# Patient Record
Sex: MALE | Race: WHITE | NOT HISPANIC OR LATINO | Employment: OTHER | URBAN - METROPOLITAN AREA
[De-identification: names, ages, dates, MRNs, and addresses within clinical notes are randomized per-mention and may not be internally consistent; named-entity substitution may affect disease eponyms.]

---

## 2017-05-15 ENCOUNTER — TRANSCRIBE ORDERS (OUTPATIENT)
Dept: ADMINISTRATIVE | Facility: HOSPITAL | Age: 72
End: 2017-05-15

## 2017-05-15 DIAGNOSIS — M54.5 LOW BACK PAIN, UNSPECIFIED BACK PAIN LATERALITY, UNSPECIFIED CHRONICITY, WITH SCIATICA PRESENCE UNSPECIFIED: Primary | ICD-10-CM

## 2017-05-23 ENCOUNTER — HOSPITAL ENCOUNTER (OUTPATIENT)
Dept: GENERAL RADIOLOGY | Facility: HOSPITAL | Age: 72
Discharge: HOME | End: 2017-05-23

## 2017-05-23 ENCOUNTER — ANESTHESIA EVENT (OUTPATIENT)
Dept: PAIN MEDICINE | Facility: HOSPITAL | Age: 72
End: 2017-05-23

## 2017-05-23 ENCOUNTER — ANESTHESIA (OUTPATIENT)
Dept: PAIN MEDICINE | Facility: HOSPITAL | Age: 72
End: 2017-05-23

## 2017-05-23 ENCOUNTER — HOSPITAL ENCOUNTER (OUTPATIENT)
Dept: PAIN MEDICINE | Facility: HOSPITAL | Age: 72
Discharge: HOME | End: 2017-05-23
Attending: SPECIALIST | Admitting: SPECIALIST

## 2017-05-23 VITALS
SYSTOLIC BLOOD PRESSURE: 122 MMHG | OXYGEN SATURATION: 99 % | HEART RATE: 60 BPM | HEIGHT: 68 IN | TEMPERATURE: 97.8 F | WEIGHT: 286 LBS | DIASTOLIC BLOOD PRESSURE: 68 MMHG | RESPIRATION RATE: 16 BRPM | BODY MASS INDEX: 43.35 KG/M2

## 2017-05-23 DIAGNOSIS — R52 PAIN: ICD-10-CM

## 2017-05-23 PROCEDURE — 25010000002 METHYLPREDNISOLONE PER 80 MG: Performed by: ANESTHESIOLOGY

## 2017-05-23 PROCEDURE — C1755 CATHETER, INTRASPINAL: HCPCS

## 2017-05-23 PROCEDURE — 0 IOPAMIDOL 41 % SOLUTION: Performed by: ANESTHESIOLOGY

## 2017-05-23 PROCEDURE — 77003 FLUOROGUIDE FOR SPINE INJECT: CPT

## 2017-05-23 RX ORDER — METHYLPREDNISOLONE ACETATE 80 MG/ML
80 INJECTION, SUSPENSION INTRA-ARTICULAR; INTRALESIONAL; INTRAMUSCULAR; SOFT TISSUE ONCE
Status: COMPLETED | OUTPATIENT
Start: 2017-05-23 | End: 2017-05-23

## 2017-05-23 RX ORDER — FENTANYL CITRATE 50 UG/ML
50 INJECTION, SOLUTION INTRAMUSCULAR; INTRAVENOUS
Status: DISCONTINUED | OUTPATIENT
Start: 2017-05-23 | End: 2017-05-24 | Stop reason: HOSPADM

## 2017-05-23 RX ORDER — HYDROCODONE BITARTRATE AND ACETAMINOPHEN 5; 325 MG/1; MG/1
1 TABLET ORAL EVERY 6 HOURS PRN
COMMUNITY
End: 2017-11-07 | Stop reason: ALTCHOICE

## 2017-05-23 RX ORDER — LATANOPROST 50 UG/ML
1 SOLUTION/ DROPS OPHTHALMIC NIGHTLY
COMMUNITY

## 2017-05-23 RX ORDER — CLOTRIMAZOLE AND BETAMETHASONE DIPROPIONATE 10; .64 MG/G; MG/G
1 CREAM TOPICAL DAILY PRN
COMMUNITY
End: 2017-11-07

## 2017-05-23 RX ORDER — SODIUM CHLORIDE 0.9 % (FLUSH) 0.9 %
1-10 SYRINGE (ML) INJECTION AS NEEDED
Status: DISCONTINUED | OUTPATIENT
Start: 2017-05-23 | End: 2017-05-24 | Stop reason: HOSPADM

## 2017-05-23 RX ORDER — MIDAZOLAM HYDROCHLORIDE 1 MG/ML
1 INJECTION INTRAMUSCULAR; INTRAVENOUS
Status: DISCONTINUED | OUTPATIENT
Start: 2017-05-23 | End: 2017-05-24 | Stop reason: HOSPADM

## 2017-05-23 RX ORDER — LIDOCAINE HYDROCHLORIDE 10 MG/ML
1 INJECTION, SOLUTION INFILTRATION; PERINEURAL ONCE
Status: DISCONTINUED | OUTPATIENT
Start: 2017-05-23 | End: 2017-05-24 | Stop reason: HOSPADM

## 2017-05-23 RX ADMIN — IOPAMIDOL 10 ML: 408 INJECTION, SOLUTION INTRATHECAL at 10:34

## 2017-05-23 RX ADMIN — METHYLPREDNISOLONE ACETATE 80 MG: 80 INJECTION, SUSPENSION INTRA-ARTICULAR; INTRALESIONAL; INTRAMUSCULAR; SOFT TISSUE at 10:35

## 2017-05-26 ENCOUNTER — HOSPITAL ENCOUNTER (OUTPATIENT)
Dept: PAIN MEDICINE | Facility: HOSPITAL | Age: 72
Discharge: HOME | End: 2017-05-26
Attending: SPECIALIST

## 2017-06-15 ENCOUNTER — HOSPITAL ENCOUNTER (OUTPATIENT)
Dept: GENERAL RADIOLOGY | Facility: HOSPITAL | Age: 72
Discharge: HOME | End: 2017-06-15

## 2017-06-15 ENCOUNTER — ANESTHESIA (OUTPATIENT)
Dept: PAIN MEDICINE | Facility: HOSPITAL | Age: 72
End: 2017-06-15

## 2017-06-15 ENCOUNTER — HOSPITAL ENCOUNTER (OUTPATIENT)
Dept: PAIN MEDICINE | Facility: HOSPITAL | Age: 72
Discharge: HOME | End: 2017-06-15
Admitting: SPECIALIST

## 2017-06-15 ENCOUNTER — ANESTHESIA EVENT (OUTPATIENT)
Dept: PAIN MEDICINE | Facility: HOSPITAL | Age: 72
End: 2017-06-15

## 2017-06-15 VITALS
RESPIRATION RATE: 16 BRPM | BODY MASS INDEX: 43.35 KG/M2 | TEMPERATURE: 97.7 F | OXYGEN SATURATION: 95 % | SYSTOLIC BLOOD PRESSURE: 111 MMHG | HEART RATE: 67 BPM | HEIGHT: 68 IN | DIASTOLIC BLOOD PRESSURE: 56 MMHG | WEIGHT: 286 LBS

## 2017-06-15 DIAGNOSIS — R52 PAIN: ICD-10-CM

## 2017-06-15 PROCEDURE — 25010000002 MIDAZOLAM PER 1 MG: Performed by: ANESTHESIOLOGY

## 2017-06-15 PROCEDURE — 77003 FLUOROGUIDE FOR SPINE INJECT: CPT

## 2017-06-15 PROCEDURE — 0 IOPAMIDOL 41 % SOLUTION: Performed by: ANESTHESIOLOGY

## 2017-06-15 PROCEDURE — 25010000002 FENTANYL CITRATE (PF) 100 MCG/2ML SOLUTION: Performed by: ANESTHESIOLOGY

## 2017-06-15 PROCEDURE — C1755 CATHETER, INTRASPINAL: HCPCS

## 2017-06-15 PROCEDURE — 25010000002 METHYLPREDNISOLONE PER 80 MG: Performed by: ANESTHESIOLOGY

## 2017-06-15 RX ORDER — LIDOCAINE HYDROCHLORIDE 10 MG/ML
0.5 INJECTION, SOLUTION INFILTRATION; PERINEURAL ONCE AS NEEDED
Status: DISCONTINUED | OUTPATIENT
Start: 2017-06-15 | End: 2017-06-16 | Stop reason: HOSPADM

## 2017-06-15 RX ORDER — FENTANYL CITRATE 50 UG/ML
50 INJECTION, SOLUTION INTRAMUSCULAR; INTRAVENOUS
Status: DISCONTINUED | OUTPATIENT
Start: 2017-06-15 | End: 2017-06-16 | Stop reason: HOSPADM

## 2017-06-15 RX ORDER — SODIUM CHLORIDE 0.9 % (FLUSH) 0.9 %
1-10 SYRINGE (ML) INJECTION AS NEEDED
Status: DISCONTINUED | OUTPATIENT
Start: 2017-06-15 | End: 2017-06-16 | Stop reason: HOSPADM

## 2017-06-15 RX ORDER — MIDAZOLAM HYDROCHLORIDE 1 MG/ML
1 INJECTION INTRAMUSCULAR; INTRAVENOUS
Status: DISCONTINUED | OUTPATIENT
Start: 2017-06-15 | End: 2017-06-16 | Stop reason: HOSPADM

## 2017-06-15 RX ORDER — LIDOCAINE HYDROCHLORIDE 10 MG/ML
1 INJECTION, SOLUTION INFILTRATION; PERINEURAL ONCE
Status: DISCONTINUED | OUTPATIENT
Start: 2017-06-15 | End: 2017-06-16 | Stop reason: HOSPADM

## 2017-06-15 RX ORDER — METHYLPREDNISOLONE ACETATE 80 MG/ML
80 INJECTION, SUSPENSION INTRA-ARTICULAR; INTRALESIONAL; INTRAMUSCULAR; SOFT TISSUE ONCE
Status: COMPLETED | OUTPATIENT
Start: 2017-06-15 | End: 2017-06-15

## 2017-06-15 RX ADMIN — MIDAZOLAM 2 MG: 1 INJECTION INTRAMUSCULAR; INTRAVENOUS at 09:44

## 2017-06-15 RX ADMIN — FENTANYL CITRATE 50 MCG: 50 INJECTION INTRAMUSCULAR; INTRAVENOUS at 09:44

## 2017-06-15 RX ADMIN — IOPAMIDOL 10 ML: 408 INJECTION, SOLUTION INTRATHECAL at 09:50

## 2017-06-15 RX ADMIN — METHYLPREDNISOLONE ACETATE 80 MG: 80 INJECTION, SUSPENSION INTRA-ARTICULAR; INTRALESIONAL; INTRAMUSCULAR; SOFT TISSUE at 09:50

## 2017-06-15 NOTE — H&P (VIEW-ONLY)
CHIEF COMPLAINT: back pain      HISTORY OF PRESENT ILLNESS:  He received 2 epidural steroid injections last January and July with excellent results lasting him almost a full year of over 50% relief.  He returns with complaints of low lumbar back pain without radiation. Constant in timing.  Between 7 and 10 out of 10 on the pain scale.  Described as aching deep throbbing.  Relieved by rest sheet and over-the-counter medicationssuch asSkelaxin and Norco.  It's affecting his ADLs is exercise and his sleep.  He is not doing any home physical therapy or home exercises and I encouraged him to begin.      PAST MEDICAL HISTORY:  Allergy to Ceclor  Medications include Flonase aspirin allopurinol Lasix Glucotrol Advil insulin Skelaxin Glucophage Toprol ProcardiaActos Flomax Diovan  History of diabetes hypertension sleep apnea morbid obesity    SOCIAL HISTORY:  No tobacco    REVIEW OF SYSTEMS:  No hematologic infectious or constitutional symptoms    PHYSICAL EXAM:  /81 pulse 72 respirations 16 sat 96% temp 36.6 height 5 foot 8 weight 286 pounds BMI 43t  Well-developed well-nourished no acute distress  Extra ocular movements intact  Mallampati class III airway  Cardiac:  Regular rate and rhythm  Lungs:  Clear to auscultation bilaterally with good effort  Alert and oriented ×3  Deep tendon reflexes normal in the left patella, decreased in the right  positive straight leg raise bilaterally  5 out of 5 strength bilateral upper and lower extremities  Lumbar spine without obvious deformities ecchymoses  Lumbar spine nontender to palpation      DIAGNOSIS:  Lumbar Spinal Stenosis without neurogenic claudication  lumbago      PLAN:  1.  Lumbar epidural steroid injections, up to 3, spaced 1-2 weeks apart.  If pain control is acceptable after 1 or 2 injections, it was discussed with the patient that they may return for the subsequent injections if and when their pain returns.  The risks were discussed with the patient including  failure of relief, worsening pain, Headache (post dural puncture headache), bleeding (epidural hematoma) and infection (epidural abscess or skin infection).  2.  Physical therapy exercises at home as prescribed by physical therapy or from the pain clinic handout (given to the patient).  Continuation of these exercises every day, or multiple times per week, even when the patient has good pain relief, was stressed to the patient as a preventative measure to decrease the frequency and severity of future pain episodes.  3.  Continue pain medicines as already prescribed.  If patient not currently taking any, it is recommended to begin Acetaminophen 1000 mg po q 8 hours.  If other medicines containing Acetaminophen are currently prescribed, maintain daily dose at 3000 mg.    4.  If they can tolerate NSAIDS, it is recommended to take Ibuprofen 600 mg po q 6 hours for 7 days during pain exacerbations.  Alternatively, they may substitute an NSAID of their choice (e.g. Aleve).  This may be taken at the same time as Acetaminophen.  5.  Heat and ice to the affected area as tolerated for pain control.  It was discussed that heating pads can cause burns.  6.  Daily low impact exercise such as walking or water exercise was recommended to maintain overall health and aid in weight control.   7.  Follow up as needed for subsequent injections.  8.  Patient was counseled to abstain from tobacco products.

## 2017-06-15 NOTE — ANESTHESIA PROCEDURE NOTES
PAIN Epidural block    Patient location during procedure: pain clinic  Start Time: 6/15/2017 9:34 AM  Stop Time: 6/15/2017 9:34 AM  Indication:procedure for pain  Performed By  Anesthesiologist: KWESI WTASON  Preanesthetic Checklist  Completed: patient identified, site marked, surgical consent, pre-op evaluation, timeout performed, risks and benefits discussed and monitors and equipment checked  Additional Notes  Interlaminar epidural was performed under fluoroscopic guidance.    Diagnosis  * Degeneration of lumbar intervertebral disc (M51.36)  * Spinal stenosis of lumbar region without neurogenic claudication (M48.06)  * Lumbago (M54.5)      Epidural Block Prep:  Pt Position:prone  Sterile Tech:cap, gloves, mask and sterile barrier  Prep:chlorhexidine gluconate and isopropyl alcohol  Monitoring:blood pressure monitoring, continuous pulse oximetry and EKG  Epidural Block Procedure:  Approach:left paramedian  Guidance: fluoroscopy  Location:lumbar  Level:4-5  Needle Type:Tuohy  Needle Gauge:20  Aspiration:negative  Medications:  Depomedrol:80 mg  Preservative Free Saline:3mL  Isovue:2mL  Comments:Epidural spread of contrast.  Post Assessment:  Dressing:occlusive dressing applied  Pt Tolerance:patient tolerated the procedure well with no apparent complications  Complications:no

## 2017-06-15 NOTE — INTERVAL H&P NOTE
[facility]  H&P reviewed. No changes since last visit.  Patient states   25-50% improvement since the last procedure/injection.    Diagnosis     * Degeneration of lumbar intervertebral disc [M51.36]     * Spinal stenosis of lumbar region without neurogenic claudication [M48.06]     * Lumbago [M54.5]      Airway assessed since last visit. Airway class equals: 2.  His pain level today is a 5/10.

## 2017-10-30 ENCOUNTER — TRANSCRIBE ORDERS (OUTPATIENT)
Dept: PAIN MEDICINE | Facility: HOSPITAL | Age: 72
End: 2017-10-30

## 2017-10-30 DIAGNOSIS — M54.5 LOW BACK PAIN, UNSPECIFIED BACK PAIN LATERALITY, UNSPECIFIED CHRONICITY, WITH SCIATICA PRESENCE UNSPECIFIED: Primary | ICD-10-CM

## 2017-11-07 ENCOUNTER — ANESTHESIA EVENT (OUTPATIENT)
Dept: PAIN MEDICINE | Facility: HOSPITAL | Age: 72
End: 2017-11-07

## 2017-11-07 ENCOUNTER — HOSPITAL ENCOUNTER (OUTPATIENT)
Dept: PAIN MEDICINE | Facility: HOSPITAL | Age: 72
Discharge: HOME | End: 2017-11-07
Admitting: SPECIALIST

## 2017-11-07 ENCOUNTER — ANESTHESIA (OUTPATIENT)
Dept: PAIN MEDICINE | Facility: HOSPITAL | Age: 72
End: 2017-11-07

## 2017-11-07 ENCOUNTER — HOSPITAL ENCOUNTER (OUTPATIENT)
Dept: GENERAL RADIOLOGY | Facility: HOSPITAL | Age: 72
Discharge: HOME | End: 2017-11-07

## 2017-11-07 VITALS
WEIGHT: 282 LBS | BODY MASS INDEX: 42.74 KG/M2 | TEMPERATURE: 97.9 F | DIASTOLIC BLOOD PRESSURE: 65 MMHG | RESPIRATION RATE: 16 BRPM | SYSTOLIC BLOOD PRESSURE: 128 MMHG | HEART RATE: 70 BPM | OXYGEN SATURATION: 97 % | HEIGHT: 68 IN

## 2017-11-07 DIAGNOSIS — R52 PAIN: ICD-10-CM

## 2017-11-07 DIAGNOSIS — M54.5 LOW BACK PAIN, UNSPECIFIED BACK PAIN LATERALITY, UNSPECIFIED CHRONICITY, WITH SCIATICA PRESENCE UNSPECIFIED: ICD-10-CM

## 2017-11-07 LAB — GLUCOSE BLDC GLUCOMTR-MCNC: 145 MG/DL (ref 70–130)

## 2017-11-07 PROCEDURE — 25010000002 MIDAZOLAM PER 1 MG: Performed by: ANESTHESIOLOGY

## 2017-11-07 PROCEDURE — C1755 CATHETER, INTRASPINAL: HCPCS

## 2017-11-07 PROCEDURE — 77003 FLUOROGUIDE FOR SPINE INJECT: CPT

## 2017-11-07 PROCEDURE — 25010000002 METHYLPREDNISOLONE PER 80 MG: Performed by: ANESTHESIOLOGY

## 2017-11-07 PROCEDURE — 0 IOPAMIDOL 41 % SOLUTION: Performed by: ANESTHESIOLOGY

## 2017-11-07 PROCEDURE — 82962 GLUCOSE BLOOD TEST: CPT

## 2017-11-07 PROCEDURE — 25010000002 FENTANYL CITRATE (PF) 100 MCG/2ML SOLUTION: Performed by: ANESTHESIOLOGY

## 2017-11-07 RX ORDER — MIDAZOLAM HYDROCHLORIDE 1 MG/ML
1 INJECTION INTRAMUSCULAR; INTRAVENOUS AS NEEDED
Status: DISCONTINUED | OUTPATIENT
Start: 2017-11-07 | End: 2017-11-08 | Stop reason: HOSPADM

## 2017-11-07 RX ORDER — FENTANYL CITRATE 50 UG/ML
50 INJECTION, SOLUTION INTRAMUSCULAR; INTRAVENOUS AS NEEDED
Status: DISCONTINUED | OUTPATIENT
Start: 2017-11-07 | End: 2017-11-08 | Stop reason: HOSPADM

## 2017-11-07 RX ORDER — METHYLPREDNISOLONE ACETATE 80 MG/ML
80 INJECTION, SUSPENSION INTRA-ARTICULAR; INTRALESIONAL; INTRAMUSCULAR; SOFT TISSUE ONCE
Status: COMPLETED | OUTPATIENT
Start: 2017-11-07 | End: 2017-11-07

## 2017-11-07 RX ORDER — SODIUM CHLORIDE 0.9 % (FLUSH) 0.9 %
1-10 SYRINGE (ML) INJECTION AS NEEDED
Status: DISCONTINUED | OUTPATIENT
Start: 2017-11-07 | End: 2017-11-08 | Stop reason: HOSPADM

## 2017-11-07 RX ORDER — LIDOCAINE HYDROCHLORIDE 10 MG/ML
1 INJECTION, SOLUTION INFILTRATION; PERINEURAL ONCE AS NEEDED
Status: DISCONTINUED | OUTPATIENT
Start: 2017-11-07 | End: 2017-11-08 | Stop reason: HOSPADM

## 2017-11-07 RX ADMIN — METHYLPREDNISOLONE ACETATE 80 MG: 80 INJECTION, SUSPENSION INTRA-ARTICULAR; INTRALESIONAL; INTRAMUSCULAR; SOFT TISSUE at 09:05

## 2017-11-07 RX ADMIN — IOPAMIDOL 2 ML: 408 INJECTION, SOLUTION INTRATHECAL at 09:05

## 2017-11-07 RX ADMIN — FENTANYL CITRATE 50 MCG: 50 INJECTION INTRAMUSCULAR; INTRAVENOUS at 09:01

## 2017-11-07 RX ADMIN — MIDAZOLAM 1 MG: 1 INJECTION INTRAMUSCULAR; INTRAVENOUS at 09:02

## 2017-11-07 NOTE — ANESTHESIA PROCEDURE NOTES
PAIN Epidural block    Patient location during procedure: pain clinic  Start Time: 11/7/2017 9:00 AM  Indication:at surgeon's request and procedure for pain  Performed By  Anesthesiologist: NATHANAEL RODRÍGUEZ  Preanesthetic Checklist  Completed: patient identified, site marked, surgical consent, pre-op evaluation, timeout performed, IV checked, risks and benefits discussed and monitors and equipment checked  Additional Notes  Post-Op Diagnosis Codes:     * Lumbar disc displacement without myelopathy (M51.26)     * Lumbar degenerative disc disease (M51.36)  Prep:  Pt Position:prone  Sterile Tech:cap, gloves, mask and sterile barrier  Prep:chlorhexidine gluconate and isopropyl alcohol  Monitoring:blood pressure monitoring, continuous pulse oximetry and EKG  Procedure:  Approach:left paramedian  Guidance: fluoroscopy  Location:lumbar  Level:4-5  Needle Type:Pao  Needle Gauge:17 G  Cath Depth at skin:10 cm  Aspiration:negative  Test Dose:negative  Medications:  Depomedrol:80 mg  Preservative Free Saline:4mL  Isovue:2mL  Comments:Parasthesias on injection  Post Assessment:  Dressing:occlusive dressing applied  Pt Tolerance:patient tolerated the procedure well with no apparent complications  Complications:no

## 2017-11-08 ENCOUNTER — TRANSCRIBE ORDERS (OUTPATIENT)
Dept: ADMINISTRATIVE | Facility: HOSPITAL | Age: 72
End: 2017-11-08

## 2017-11-08 DIAGNOSIS — M54.40 ACUTE LEFT-SIDED LOW BACK PAIN WITH SCIATICA, SCIATICA LATERALITY UNSPECIFIED: Primary | ICD-10-CM

## 2017-12-18 ENCOUNTER — HOSPITAL ENCOUNTER (OUTPATIENT)
Dept: PAIN MEDICINE | Facility: HOSPITAL | Age: 72
Discharge: HOME | End: 2017-12-18
Attending: SPECIALIST

## 2017-12-28 ENCOUNTER — ANESTHESIA EVENT (OUTPATIENT)
Dept: PAIN MEDICINE | Facility: HOSPITAL | Age: 72
End: 2017-12-28

## 2017-12-28 ENCOUNTER — HOSPITAL ENCOUNTER (OUTPATIENT)
Dept: PAIN MEDICINE | Facility: HOSPITAL | Age: 72
Discharge: HOME | End: 2017-12-28
Attending: SPECIALIST | Admitting: SPECIALIST

## 2017-12-28 ENCOUNTER — ANESTHESIA (OUTPATIENT)
Dept: PAIN MEDICINE | Facility: HOSPITAL | Age: 72
End: 2017-12-28

## 2017-12-28 ENCOUNTER — HOSPITAL ENCOUNTER (OUTPATIENT)
Dept: GENERAL RADIOLOGY | Facility: HOSPITAL | Age: 72
Discharge: HOME | End: 2017-12-28

## 2017-12-28 VITALS
SYSTOLIC BLOOD PRESSURE: 131 MMHG | TEMPERATURE: 97.8 F | DIASTOLIC BLOOD PRESSURE: 71 MMHG | OXYGEN SATURATION: 97 % | BODY MASS INDEX: 42.44 KG/M2 | RESPIRATION RATE: 16 BRPM | HEIGHT: 68 IN | WEIGHT: 280 LBS | HEART RATE: 72 BPM

## 2017-12-28 DIAGNOSIS — R52 PAIN: ICD-10-CM

## 2017-12-28 PROCEDURE — 25010000002 METHYLPREDNISOLONE PER 80 MG: Performed by: ANESTHESIOLOGY

## 2017-12-28 PROCEDURE — 25010000002 MIDAZOLAM PER 1 MG: Performed by: ANESTHESIOLOGY

## 2017-12-28 PROCEDURE — 77003 FLUOROGUIDE FOR SPINE INJECT: CPT

## 2017-12-28 PROCEDURE — C1755 CATHETER, INTRASPINAL: HCPCS

## 2017-12-28 PROCEDURE — 25010000002 FENTANYL CITRATE (PF) 100 MCG/2ML SOLUTION: Performed by: ANESTHESIOLOGY

## 2017-12-28 PROCEDURE — 0 IOPAMIDOL 41 % SOLUTION: Performed by: ANESTHESIOLOGY

## 2017-12-28 RX ORDER — SODIUM CHLORIDE 0.9 % (FLUSH) 0.9 %
1-10 SYRINGE (ML) INJECTION AS NEEDED
Status: DISCONTINUED | OUTPATIENT
Start: 2017-12-28 | End: 2017-12-29 | Stop reason: HOSPADM

## 2017-12-28 RX ORDER — LIDOCAINE HYDROCHLORIDE 10 MG/ML
0.5 INJECTION, SOLUTION INFILTRATION; PERINEURAL ONCE AS NEEDED
Status: CANCELLED | OUTPATIENT
Start: 2017-12-28

## 2017-12-28 RX ORDER — FENTANYL CITRATE 50 UG/ML
50 INJECTION, SOLUTION INTRAMUSCULAR; INTRAVENOUS AS NEEDED
Status: DISCONTINUED | OUTPATIENT
Start: 2017-12-28 | End: 2017-12-29 | Stop reason: HOSPADM

## 2017-12-28 RX ORDER — LIDOCAINE HYDROCHLORIDE 10 MG/ML
1 INJECTION, SOLUTION INFILTRATION; PERINEURAL ONCE AS NEEDED
Status: DISCONTINUED | OUTPATIENT
Start: 2017-12-28 | End: 2017-12-29 | Stop reason: HOSPADM

## 2017-12-28 RX ORDER — MIDAZOLAM HYDROCHLORIDE 1 MG/ML
1 INJECTION INTRAMUSCULAR; INTRAVENOUS AS NEEDED
Status: DISCONTINUED | OUTPATIENT
Start: 2017-12-28 | End: 2017-12-29 | Stop reason: HOSPADM

## 2017-12-28 RX ORDER — METHYLPREDNISOLONE ACETATE 80 MG/ML
80 INJECTION, SUSPENSION INTRA-ARTICULAR; INTRALESIONAL; INTRAMUSCULAR; SOFT TISSUE ONCE
Status: COMPLETED | OUTPATIENT
Start: 2017-12-28 | End: 2017-12-28

## 2017-12-28 RX ADMIN — FENTANYL CITRATE 50 MCG: 50 INJECTION INTRAMUSCULAR; INTRAVENOUS at 10:40

## 2017-12-28 RX ADMIN — IOPAMIDOL 10 ML: 408 INJECTION, SOLUTION INTRATHECAL at 10:48

## 2017-12-28 RX ADMIN — Medication 1 MG: at 10:46

## 2017-12-28 RX ADMIN — METHYLPREDNISOLONE ACETATE 80 MG: 80 INJECTION, SUSPENSION INTRA-ARTICULAR; INTRALESIONAL; INTRAMUSCULAR; SOFT TISSUE at 10:48

## 2017-12-28 RX ADMIN — Medication 1 MG: at 10:40

## 2017-12-28 NOTE — ANESTHESIA PROCEDURE NOTES
PAIN Epidural block    Patient location during procedure: pain clinic  Start Time: 12/28/2017 10:31 AM  Stop Time: 12/28/2017 10:49 AM  Indication:procedure for pain  Performed By  Anesthesiologist: KWESI WATSON  Preanesthetic Checklist  Completed: patient identified, site marked, surgical consent, pre-op evaluation, timeout performed, risks and benefits discussed and monitors and equipment checked  Additional Notes  Interlaminar epidural was performed under fluoroscopic guidance.    Diagnosis     * Degeneration of lumbar intervertebral disc (M51.36)     * Lumbago (M54.5)     * Spinal stenosis, lumbar region without neurogenic claudication (M48.061)  Prep:  Pt Position:prone  Sterile Tech:cap, gloves, mask and sterile barrier  Prep:chlorhexidine gluconate and isopropyl alcohol  Monitoring:blood pressure monitoring, continuous pulse oximetry and EKG  Procedure:  Sedation: yes   Approach:left paramedian  Guidance: fluoroscopy  Location:lumbar  Level:4-5  Needle Type:Tuohy  Needle Gauge:20 G  Aspiration:negative  Medications:  Depomedrol:80 mg  Preservative Free Saline:3mL  Isovue:2mL  Comments:Epidural spread of contrast.  Post Assessment:  Dressing:occlusive dressing applied  Pt Tolerance:patient tolerated the procedure well with no apparent complications  Complications:no

## 2017-12-28 NOTE — H&P
[facility]    History and Physical    Patient Name: Will Garcia  :  1945  MRN:  [id number]  Date of Admission: 2017    Subjective     Patient is a 72 y.o. male presents with chief complaint of chronic, constant, moderate, severe low back pain.  Onset of symptoms was gradual starting 50 years ago.  Symptoms are associated/aggravated by activity. Symptoms improve with pain medication and rest.  He has recently undergone a series of lumbar epidural steroid injections with favorable results.  However, he has began to notice some subjective weakness in his left lower extremity following his last injection.  He presents today for a new series.    The following portions of the patients history were reviewed and updated as appropriate: current medications, allergies, past medical history, past surgical history, past family history, past social history and problem list                Objective     Past Medical History:   Past Medical History:   Diagnosis Date   • Arthritis    • Chronic pain disorder    • Diabetes    • Edema    • Hypertension    • Kidney stones    • Low back pain    • Lumbosacral disc disease    • Sleep apnea      Past Surgical History:   Past Surgical History:   Procedure Laterality Date   • CATARACT EXTRACTION     • EPIDURAL BLOCK     • KIDNEY STONE SURGERY     • SHOULDER ARTHROSCOPY Left    • TONSILLECTOMY       Family History: No family history on file.  Social History:   Social History   Substance Use Topics   • Smoking status: Never Smoker   • Smokeless tobacco: Never Used   • Alcohol use Yes      Comment: occasional       Vital Signs Range for the last 24 hours  Temperature: Temp:  [36.6 °C (97.8 °F)] 36.6 °C (97.8 °F)   Temp Source: Temp src: Oral   BP: BP: (144)/(77) 144/77   Pulse: Heart Rate:  [76] 76   Respirations: Resp:  [16] 16   SPO2: SpO2:  [96 %] 96 %   O2 Amount (l/min):     O2 Devices O2 Device: room air   Weight:            --------------------------------------------------------------------------------    Current Outpatient Prescriptions   Medication Sig Dispense Refill   • Albiglutide (TANZEUM) 50 MG pen-injector Inject 50 mg under the skin 1 (One) Time Per Week.     • allopurinol (ZYLOPRIM) 100 MG tablet Take 100 mg by mouth Every Night.     • fluticasone (FLONASE) 50 MCG/ACT nasal spray 2 sprays into each nostril daily. Administer 2 sprays in each nostril for each dose.     • furosemide (LASIX) 80 MG tablet Take 80 mg by mouth daily.     • ibuprofen (ADVIL,MOTRIN) 800 MG tablet Take 800 mg by mouth every 8 (eight) hours as needed for mild pain (1-3).     • insulin glargine (LANTUS) 100 UNIT/ML injection Inject 110 Units under the skin Every Night.     • latanoprost (XALATAN) 0.005 % ophthalmic solution 1 drop Every Night.     • metFORMIN (GLUCOPHAGE) 500 MG tablet Take 500 mg by mouth Every Night. 4 tabs     • metoprolol succinate XL (TOPROL-XL) 100 MG 24 hr tablet Take 100 mg by mouth daily.     • NIFEdipine XL (PROCARDIA XL) 60 MG 24 hr tablet Take 60 mg by mouth Every Night.     • pioglitazone (ACTOS) 45 MG tablet Take 45 mg by mouth daily.     • tamsulosin (FLOMAX) 0.4 MG capsule 24 hr capsule Take 2 capsules by mouth every night.     • valsartan (DIOVAN) 320 MG tablet Take 320 mg by mouth daily.     • aspirin 81 MG EC tablet Take 81 mg by mouth Every Night.       No current facility-administered medications for this encounter.        --------------------------------------------------------------------------------  Assessment/Plan      Anesthesia Evaluation     Patient summary reviewed and Nursing notes reviewed   NPO Solid Status: > 8 hours  NPO Liquid Status: < 2 hours     Airway   Mallampati: II  TM distance: >3 FB  Neck ROM: full  Dental - normal exam     Pulmonary - normal exam    breath sounds clear to auscultation  (+) sleep apnea,   Cardiovascular - normal exam    Patient on routine beta blocker  Rhythm:  regular  Rate: normal    (+) hypertension,   (-) angina, orthopnea, PND, FLOYD      Neuro/Psych- negative ROS  GI/Hepatic/Renal/Endo    (+) morbid obesity, renal disease stones, diabetes mellitus using insulin,     Musculoskeletal     (+) back pain, chronic pain,   Abdominal  - normal exam    Abdomen: soft.  Bowel sounds: normal.   Substance History - negative use     OB/GYN negative ob/gyn ROS         Other - negative ROS                                          Diagnosis and Plan    Treatment Plan  ASA 3      Procedures: Lumbar Epidural Steroid Injection(LESI), With fluoroscopy,       Anesthetic plan and risks discussed with patient.          Diagnosis     * Degeneration of lumbar intervertebral disc [M51.36]     * Lumbago [M54.5]     * Spinal stenosis, lumbar region without neurogenic claudication [M48.061]

## 2018-02-14 ENCOUNTER — TRANSCRIBE ORDERS (OUTPATIENT)
Dept: ADMINISTRATIVE | Facility: HOSPITAL | Age: 73
End: 2018-02-14

## 2018-02-14 DIAGNOSIS — M79.605 LEFT LEG PAIN: Primary | ICD-10-CM

## 2018-02-22 ENCOUNTER — HOSPITAL ENCOUNTER (OUTPATIENT)
Dept: INFUSION THERAPY | Facility: HOSPITAL | Age: 73
Discharge: HOME | End: 2018-02-22
Attending: SPECIALIST | Admitting: PSYCHIATRY & NEUROLOGY

## 2018-02-22 DIAGNOSIS — M79.605 LEFT LEG PAIN: ICD-10-CM

## 2018-02-22 PROCEDURE — 95886 MUSC TEST DONE W/N TEST COMP: CPT

## 2018-02-22 PROCEDURE — 95907 NVR CNDJ TST 1-2 STUDIES: CPT | Performed by: PSYCHIATRY & NEUROLOGY

## 2018-02-22 PROCEDURE — 95886 MUSC TEST DONE W/N TEST COMP: CPT | Performed by: PSYCHIATRY & NEUROLOGY

## 2018-02-22 PROCEDURE — 95907 NVR CNDJ TST 1-2 STUDIES: CPT

## 2018-02-22 NOTE — PROCEDURES
EMG REPORT    Indication: Numbness and weakness periodically of the left leg.    Findings: Left peroneal motor study showed normal distal latency velocity and amplitude.  Left tibial motor study showed normal distal latency amplitude and F-wave latency.    Concentric needle EMG of bilateral vastus lateralis, vastus medialis, peroneus, gastrocnemius muscles showed no abnormality.  Similar study in the left anterior tibialis muscle showed some activity at rest and form fibrillation potentials and positive sharp waves.    Impression: Testing above is most consistent with a left L5 radiculopathy.  The findings are relatively mild.  Her conduction studies of the left lower extremity were normal.    Thank you for sending this patient for further evaluation.  Phong Francisco M.D.

## 2018-04-03 ENCOUNTER — ANESTHESIA (OUTPATIENT)
Dept: GASTROENTEROLOGY | Facility: HOSPITAL | Age: 73
End: 2018-04-03

## 2018-04-03 ENCOUNTER — ANESTHESIA EVENT (OUTPATIENT)
Dept: GASTROENTEROLOGY | Facility: HOSPITAL | Age: 73
End: 2018-04-03

## 2018-04-03 ENCOUNTER — HOSPITAL ENCOUNTER (OUTPATIENT)
Facility: HOSPITAL | Age: 73
Setting detail: HOSPITAL OUTPATIENT SURGERY
Discharge: HOME | End: 2018-04-03
Attending: SURGERY | Admitting: SURGERY

## 2018-04-03 VITALS
OXYGEN SATURATION: 96 % | SYSTOLIC BLOOD PRESSURE: 131 MMHG | TEMPERATURE: 98.1 F | HEART RATE: 69 BPM | RESPIRATION RATE: 16 BRPM | DIASTOLIC BLOOD PRESSURE: 66 MMHG

## 2018-04-03 LAB
GLUCOSE BLDC GLUCOMTR-MCNC: 185 MG/DL (ref 70–130)
GLUCOSE BLDC GLUCOMTR-MCNC: 185 MG/DL (ref 70–130)

## 2018-04-03 PROCEDURE — 82962 GLUCOSE BLOOD TEST: CPT

## 2018-04-03 PROCEDURE — 25010000002 PROPOFOL 10 MG/ML EMULSION: Performed by: NURSE ANESTHETIST, CERTIFIED REGISTERED

## 2018-04-03 RX ORDER — LIDOCAINE HYDROCHLORIDE 20 MG/ML
INJECTION, SOLUTION INFILTRATION; PERINEURAL AS NEEDED
Status: DISCONTINUED | OUTPATIENT
Start: 2018-04-03 | End: 2018-04-03 | Stop reason: SURG

## 2018-04-03 RX ORDER — SODIUM CHLORIDE 0.9 % (FLUSH) 0.9 %
1-10 SYRINGE (ML) INJECTION AS NEEDED
Status: DISCONTINUED | OUTPATIENT
Start: 2018-04-03 | End: 2018-04-03 | Stop reason: HOSPADM

## 2018-04-03 RX ORDER — PROPOFOL 10 MG/ML
VIAL (ML) INTRAVENOUS CONTINUOUS PRN
Status: DISCONTINUED | OUTPATIENT
Start: 2018-04-03 | End: 2018-04-03 | Stop reason: SURG

## 2018-04-03 RX ORDER — SODIUM CHLORIDE, SODIUM LACTATE, POTASSIUM CHLORIDE, CALCIUM CHLORIDE 600; 310; 30; 20 MG/100ML; MG/100ML; MG/100ML; MG/100ML
30 INJECTION, SOLUTION INTRAVENOUS CONTINUOUS PRN
Status: DISCONTINUED | OUTPATIENT
Start: 2018-04-03 | End: 2018-04-03 | Stop reason: HOSPADM

## 2018-04-03 RX ADMIN — PROPOFOL 200 MCG/KG/MIN: 10 INJECTION, EMULSION INTRAVENOUS at 10:22

## 2018-04-03 RX ADMIN — LIDOCAINE HYDROCHLORIDE 60 MG: 20 INJECTION, SOLUTION INFILTRATION; PERINEURAL at 10:23

## 2018-04-03 RX ADMIN — SODIUM CHLORIDE, POTASSIUM CHLORIDE, SODIUM LACTATE AND CALCIUM CHLORIDE: 600; 310; 30; 20 INJECTION, SOLUTION INTRAVENOUS at 10:18

## 2018-04-03 NOTE — ANESTHESIA POSTPROCEDURE EVALUATION
Patient: Will Garcia    Procedure Summary     Date:  04/03/18 Room / Location:   [facility]    Anesthesia Start:  1018 Anesthesia Stop:  1039    Procedure:  COLONOSCOPY TO CECUM (N/A ) Diagnosis:      Surgeon:  Royal Metcalf MD Provider:  Haydee Smiley MD    Anesthesia Type:  MAC ASA Status:  3          Anesthesia Type: MAC  Last vitals  BP   118/61 (04/03/18 1052)   Temp   36.7 °C (98.1 °F) (04/03/18 1052)   Pulse   74 (04/03/18 1052)   Resp   16 (04/03/18 1052)     SpO2   100 % (04/03/18 1052)     Post Anesthesia Care and Evaluation    Patient location during evaluation: bedside  Patient participation: complete - patient participated  Level of consciousness: awake and alert  Pain score: 0  Pain management: adequate  Airway patency: patent  Anesthetic complications: No anesthetic complications  PONV Status: none  Cardiovascular status: acceptable  Respiratory status: acceptable  Hydration status: acceptable  Post Neuraxial Block status: Motor and sensory function returned to baseline

## 2018-04-03 NOTE — H&P
Date: 4/3/2018     Patient: Will Garcia    : 1945   [id number]       History:   The patient is a 72 y.o. male of Kristal Burrows MD  who presents for screening colonoscopy. The patient currently has no complaints. he  denies any history of nausea, abdominal pain, weight loss, change in bowel habits or rectal bleeding.   Family history is negative for for colon cancer or polyps.     The patient has had a colonoscopy in the past- multiple polyps.      The following portions of the patient's history were reviewed and updated as appropriate: allergies, current medications, past family history, past medical history, past social history, past surgical history and problem list.    Past Medical History:   Diagnosis Date   • Arthritis    • Chronic pain disorder    • Diabetes    • Edema    • Hard to intubate    • Hypertension    • Kidney stones    • Low back pain    • Lumbosacral disc disease    • Sleep apnea       Past Surgical History:   Procedure Laterality Date   • CATARACT EXTRACTION     • EPIDURAL BLOCK     • KIDNEY STONE SURGERY     • SHOULDER ARTHROSCOPY Left    • TONSILLECTOMY       Medications:   Prescriptions Prior to Admission   Medication Sig Dispense Refill Last Dose   • fluticasone (FLONASE) 50 MCG/ACT nasal spray 2 sprays into each nostril daily. Administer 2 sprays in each nostril for each dose.   Past Month at Unknown time   • latanoprost (XALATAN) 0.005 % ophthalmic solution 1 drop Every Night.   2018 at Unknown time   • metFORMIN (GLUCOPHAGE) 500 MG tablet Take 500 mg by mouth Every Night. 4 tabs   2018 at Unknown time   • metoprolol succinate XL (TOPROL-XL) 100 MG 24 hr tablet Take 100 mg by mouth daily.   2018 at Unknown time   • NIFEdipine XL (PROCARDIA XL) 60 MG 24 hr tablet Take 60 mg by mouth Every Night.   2018 at Unknown time   • Albiglutide (TANZEUM) 50 MG pen-injector Inject 50 mg under the skin 1 (One) Time Per Week.   3/27/2018   • allopurinol (ZYLOPRIM) 100 MG  tablet Take 100 mg by mouth Every Night.   4/1/2018   • aspirin 81 MG EC tablet Take 81 mg by mouth Every Night.   3/20/2018   • furosemide (LASIX) 80 MG tablet Take 80 mg by mouth daily.   4/1/2018   • ibuprofen (ADVIL,MOTRIN) 800 MG tablet Take 800 mg by mouth every 8 (eight) hours as needed for mild pain (1-3).   Taking   • insulin glargine (LANTUS) 100 UNIT/ML injection Inject 110 Units under the skin Every Night.   4/2/2018   • pioglitazone (ACTOS) 45 MG tablet Take 45 mg by mouth daily.   4/1/2018   • tamsulosin (FLOMAX) 0.4 MG capsule 24 hr capsule Take 2 capsules by mouth every night.   4/1/2018   • valsartan (DIOVAN) 320 MG tablet Take 320 mg by mouth daily.   4/2/2018      Allergies: Ceclor [cefaclor]   Social History:  Social History     Social History   • Marital status:      Social History Main Topics   • Smoking status: Never Smoker   • Smokeless tobacco: Never Used   • Alcohol use Yes      Comment: occasional   • Drug use: No   • Sexual activity: Defer     Other Topics Concern   • Not on file        History reviewed. No pertinent family history.       Review of Systems:   Negative  ros    Physical Examination:  General - well developed  male in no distress.  HEENT - pupils are equal, conjunctiva are pink, sclera are non-icteric.  Mouth - mucous membranes are moist. Speech is normal with no hoarseness.  Neck - supple, no adenopathy or masses, no JVD.  Chest - clear.  Heart - regular rate and rhythm.  Abomen - soft, non-tender, non-distended, no masses or hernias.   Ext - no edema or cyanosis, capillary refill brisk.    Impression:  72 y.o. male for screening colonoscopy.    Plan:  Screening colonoscopy.  Generalities of the procedure were described.  Risks of bleeding and/or bowel perforation were discussed.      Signature: Royal Metcalf MD    CC: Kristal Burrows MD

## 2018-04-03 NOTE — OP NOTE
Colonoscopy Procedure Note    Pre-operative Diagnosis: screening for colon cancer    Post-operative Diagnosis: normal,    Procedure: Colonoscopy --screening    Surgeon:  Royal Metcalf M.D,  F.A.C.S.    Sedation: MAC    Procedure Details:  Informed consent was obtained for the procedure, including sedation.  Risks of perforation, hemorrhage, were discussed.The patient was monitored continuously with ECG tracing, pulse oximetry, blood pressure monitoring, and direct observations. The patient was placed in the left lateral decubitus position. The patient was given intravenous sedation by anesthesia.     A digital rectal examination was performed.  The variable-stiffness pediatric colonoscope was inserted into the rectum and advanced under direct vision to the cecum, which was identified by the ileocecal valve and appendiceal orifice.  The quality of the colonic preparation was good. A retroflexed view of the right colon was normal.     Careful inspection was made as the colonoscope was withdrawn, including a retroflexed view of the rectum.  No abnormalities were seen anywhere in the colon.  No diverticulosis was seen.  No inflammatory changes or polyps were seen.  Digital exam of the rectum was performed which revealed no abnormalities    Colonoscopy findings:  -normal colonic mucosa throughout  -no polyps    Specimens: none           Complications:  none    Recommendations:  -For colon cancer screening in this patient, a repeat colonoscopy is not recommended in view of his age and his poor health.          Signature: Royal Metcalf MD

## 2018-04-09 ENCOUNTER — TRANSCRIBE ORDERS (OUTPATIENT)
Dept: PHYSICAL THERAPY | Facility: CLINIC | Age: 73
End: 2018-04-09

## 2018-04-09 ENCOUNTER — TREATMENT (OUTPATIENT)
Dept: PHYSICAL THERAPY | Facility: CLINIC | Age: 73
End: 2018-04-09

## 2018-04-09 DIAGNOSIS — M54.16 LEFT LUMBAR RADICULOPATHY: Primary | ICD-10-CM

## 2018-04-09 DIAGNOSIS — M54.50 CHRONIC BILATERAL LOW BACK PAIN WITHOUT SCIATICA: Primary | ICD-10-CM

## 2018-04-09 DIAGNOSIS — G89.29 CHRONIC BILATERAL LOW BACK PAIN WITHOUT SCIATICA: Primary | ICD-10-CM

## 2018-04-09 PROCEDURE — 97110 THERAPEUTIC EXERCISES: CPT | Performed by: PHYSICAL THERAPIST

## 2018-04-09 PROCEDURE — 97161 PT EVAL LOW COMPLEX 20 MIN: CPT | Performed by: PHYSICAL THERAPIST

## 2018-04-09 RX ORDER — ASPIRIN 81 MG/1
81 TABLET ORAL NIGHTLY
Status: CANCELLED | OUTPATIENT
Start: 2018-04-09

## 2018-04-09 NOTE — PROGRESS NOTES
Physical Therapy Initial Evaluation and Plan of Care      Patient: Will Garcia   : 1945  Diagnosis/ICD-10 Code:  Left lumbar radiculopathy [M54.16]  Referring practitioner: Kristal Burrows MD    Subjective Evaluation    History of Present Illness  Mechanism of injury: Chronic back pain. Had MICHELLE's which helped.   My left leg gives way. I have tremors in leg then collapses. I have not fallen. I am being careful. Intermittent . Good days and bad days. No leg pain. Started shortly after 17 MICHELLE.  2 days ago. Afraid to walk to mailbox.  No sleep disturbance. CPAP  Prior L RTC repair  Peripheral neuropathy  Diabetes Insulin dependent  HAve tried PT but it usually makes my back hurt   I have seen neuro      Patient Occupation: retired psychtrist Pain  Quality: weakness LLE.  Progression: worsening    Social Support  Lives in: multiple-level home  Lives with: spouse    Diagnostic Tests  MRI studies: abnormal  EMG: abnormal (L5-S1)    Treatments  Previous treatment: injection treatment           Objective       Active Range of Motion   Cervical/Thoracic Spine     Thoracic   Left lateral flexion: Active left thoracic lateral flexion: 25%     Lumbar   Flexion: Active lumbar flexion: 75%   Extension: Active lumbar extension: pain 25%   Left lateral flexion: Active left lumbar lateral flexion: 25%   Right lateral flexion: Active right lumbar lateral flexion: 25%   Left rotation: WFL  Right rotation: WFL    Joint Play   Left Hip   Hypomobile in the lateral hip capsule.    Strength/Myotome Testing     Left Hip   Planes of Motion   Flexion: 4+  Extension: 4+  Abduction: 5  External rotation: 5  Internal rotation: 5    Right Hip   Planes of Motion   Flexion: 5  Extension: 4  Abduction: 5  External rotation: 5  Internal rotation: 5    Left Knee   Flexion: 5  Extension: 5    Right Knee   Flexion: 5  Extension: 5    Left Ankle/Foot   Dorsiflexion: 5    Right Ankle/Foot   Dorsiflexion: 5    Additional Strength  "Details  Lower abs NT    Tests     Lumbar   Negative repeated extension.     Left   Positive quadrant.   Negative passive SLR.     Right   Positive quadrant.   Negative passive SLR.     Additional Tests Details  Pain with quadrant but not LE symptoms         Assessment & Plan     Assessment  Impairments: abnormal or restricted ROM, impaired physical strength, lacks appropriate home exercise program and pain with function  Assessment details:     Pt is a good candidate for skilled PT intervention to restore functional AROM and strength to return to previous level of ADL's.  Prognosis: fair  Prognosis details:     Short Term Goals: ( 2 weeks)  1.Pt to be independent with HEP  2. Pt to exhibit improved lumbar extension  to 50% to allow for increased ease with ADL's  3. Pt to demonstrate proper lifting technique  4. Pt to improve SLS time on each LE to 10 seconds    Long Term Goals (4 weeks )  1. Pt to report min to no \"giving way\" of LLE with community ambulation  2. Pt to exhibit 4/5 lower abdominal strength to allow for more strenuous daily activities  3. Pt to exhibit lumbar AROM to 100% flexion and extension to allow for reaching and bending with min to no pain  4. Pt to score < 30% on Back Index  Functional Limitations: walking      Manual Therapy:         mins  73309;  Therapeutic Exercise:    5     mins  90577;     Neuromuscular Maribel:    5    mins  93331;    Therapeutic Activity:          mins  49090;     Gait Training:           mins  64917;     Ultrasound:          mins  34828;    Electrical Stimulation:         mins  16906 ( );  Dry Needling          mins self-pay    Timed Treatment:   10   mins   Total Treatment:     45   mins    PT SIGNATURE: Tiffanie Meeyr PT   [place] License # [id number]  DATE TREATMENT INITIATED: 4/10/2018    Initial Certification  Certification Period: 7/9/2018  I certify that the therapy services are furnished while this patient is under my care.  The services outlined above are " required by this patient, and will be reviewed every 90 days.     PHYSICIAN:       DATE:     Please sign and return via fax to [phone].. Thank you, [facility].

## 2018-04-20 ENCOUNTER — TREATMENT (OUTPATIENT)
Dept: PHYSICAL THERAPY | Facility: CLINIC | Age: 73
End: 2018-04-20

## 2018-04-20 DIAGNOSIS — M54.16 LEFT LUMBAR RADICULOPATHY: Primary | ICD-10-CM

## 2018-04-20 PROCEDURE — 97110 THERAPEUTIC EXERCISES: CPT | Performed by: PHYSICAL THERAPIST

## 2018-04-20 PROCEDURE — 97112 NEUROMUSCULAR REEDUCATION: CPT | Performed by: PHYSICAL THERAPIST

## 2018-04-20 NOTE — PROGRESS NOTES
Physical Therapy Daily Progress Note        Subjective     Will Garcia reports: Hip stretch was sore but better now. I have a MRI scheduled today. Ordered by Dr. Lemus    Objective   See Exercise, Manual, and Modality Logs for complete treatment.       Assessment/Plan   Discussed weight loss with pt and that it would help his back. Encouraged him to work on TA throughout the day. Will wait until after his MRI before proceeding with any mor ePT    Progress per Plan of Care and Awaiting MD orders           Manual Therapy:         mins  54588;  Therapeutic Exercise: 20      mins  12849;     Neuromuscular Maribel:5       mins  05528;    Therapeutic Activity:         mins  39965;     Gait Training:         mins  29463;     Ultrasound:         mins  88391;    Electrical Stimulation:        mins  55021 ( );  Dry Needling         mins self-pay    Timed Treatment:   25   mins   Total Treatment:     30   mins    Tiffanie Meyer, PT  Physical Therapist  [place] License # [id number]

## 2018-05-03 ENCOUNTER — OFFICE VISIT (OUTPATIENT)
Dept: NEUROSURGERY | Facility: CLINIC | Age: 73
End: 2018-05-03

## 2018-05-03 VITALS
BODY MASS INDEX: 42.13 KG/M2 | HEIGHT: 68 IN | DIASTOLIC BLOOD PRESSURE: 64 MMHG | WEIGHT: 278 LBS | RESPIRATION RATE: 16 BRPM | HEART RATE: 72 BPM | SYSTOLIC BLOOD PRESSURE: 118 MMHG

## 2018-05-03 DIAGNOSIS — E66.01 MORBID OBESITY WITH BMI OF 40.0-44.9, ADULT (HCC): ICD-10-CM

## 2018-05-03 DIAGNOSIS — I10 ESSENTIAL HYPERTENSION: ICD-10-CM

## 2018-05-03 DIAGNOSIS — N40.1 BENIGN PROSTATIC HYPERPLASIA WITH URINARY FREQUENCY: ICD-10-CM

## 2018-05-03 DIAGNOSIS — E11.40 TYPE 2 DIABETES MELLITUS WITH DIABETIC NEUROPATHY, WITH LONG-TERM CURRENT USE OF INSULIN (HCC): Primary | ICD-10-CM

## 2018-05-03 DIAGNOSIS — Z79.4 TYPE 2 DIABETES MELLITUS WITH DIABETIC NEUROPATHY, WITH LONG-TERM CURRENT USE OF INSULIN (HCC): Primary | ICD-10-CM

## 2018-05-03 DIAGNOSIS — M48.062 SPINAL STENOSIS, LUMBAR REGION, WITH NEUROGENIC CLAUDICATION: ICD-10-CM

## 2018-05-03 DIAGNOSIS — G47.33 OBSTRUCTIVE SLEEP APNEA: ICD-10-CM

## 2018-05-03 DIAGNOSIS — R35.0 BENIGN PROSTATIC HYPERPLASIA WITH URINARY FREQUENCY: ICD-10-CM

## 2018-05-03 PROBLEM — E11.49 TYPE 2 DIABETES MELLITUS WITH NEUROLOGIC COMPLICATION (HCC): Status: ACTIVE | Noted: 2018-05-03

## 2018-05-03 PROCEDURE — 99204 OFFICE O/P NEW MOD 45 MIN: CPT | Performed by: NEUROLOGICAL SURGERY

## 2018-05-14 ENCOUNTER — OFFICE VISIT (OUTPATIENT)
Dept: CARDIOLOGY | Facility: CLINIC | Age: 73
End: 2018-05-14

## 2018-05-14 VITALS
SYSTOLIC BLOOD PRESSURE: 110 MMHG | HEART RATE: 78 BPM | RESPIRATION RATE: 18 BRPM | DIASTOLIC BLOOD PRESSURE: 56 MMHG | HEIGHT: 68 IN | BODY MASS INDEX: 41.68 KG/M2 | WEIGHT: 275 LBS

## 2018-05-14 DIAGNOSIS — R94.31 ABNORMAL ECG: Primary | ICD-10-CM

## 2018-05-14 DIAGNOSIS — R06.02 SHORTNESS OF BREATH: ICD-10-CM

## 2018-05-14 PROCEDURE — 99204 OFFICE O/P NEW MOD 45 MIN: CPT | Performed by: INTERNAL MEDICINE

## 2018-05-14 PROCEDURE — 93000 ELECTROCARDIOGRAM COMPLETE: CPT | Performed by: INTERNAL MEDICINE

## 2018-05-14 NOTE — PROGRESS NOTES
Subjective:     Encounter Date:05/14/2018      Patient ID: Will Garcia is a 72 y.o. male.    Chief Complaint:Perioperative risk assessment.  Hypertension   This is a chronic problem. The current episode started more than 1 year ago. The problem is controlled.       This is a 72 year old gentleman who presents today for evaluation.  Patient is to undergo a surgery.  He actually has neurologic issues including foot drop from what sounds like a bulging disc.  Patient has multiple cardiac risk factors including diabetes hypertension and hyperlipidemia.  He does say he gets short of breath fairly easily but denies chest pains dizziness and palpitations.  Patient admits that he is not able to ambulate very much due to the fact that he has these issues saying it's difficult to walk.  Patient had an EKG done today that was not normal.  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  Since his inability to walk long distances for at least a year and multiple cardiac risk factors he was seen for further evaluation    Review of Systems   All other systems reviewed and are negative.        ECG 12 Lead  Date/Time: 5/14/2018 1:53 PM  Performed by: BHUMIKA MARINELLI  Authorized by: BHUMIKA MARINELLI   Comparison: compared with previous ECG from 7/10/2009  Rhythm: sinus rhythm  Conduction: incomplete RBBB and LAFB  Clinical impression: abnormal ECG               Objective:     Physical Exam   Constitutional: He is oriented to person, place, and time. He appears well-developed.   HENT:   Head: Normocephalic.   Eyes: Conjunctivae are normal.   Neck: Normal range of motion.   Cardiovascular: Normal rate, regular rhythm and normal heart sounds.    Pulmonary/Chest: Breath sounds normal.   Abdominal: Soft. Bowel sounds are normal.   obese   Musculoskeletal: Normal range of motion. He exhibits no edema.   Neurological: He is alert and oriented to person, place, and time.   Skin: Skin is warm and dry.   Psychiatric:  He has a normal mood and affect. His behavior is normal.   Vitals reviewed.      Lab Review:       Assessment:          Diagnosis Plan   1. Abnormal ECG  Adult Transthoracic Echo Complete W/ Cont if Necessary Per Protocol    Stress Test With Pet Myocardial Perfusion (Multi-Study)   2. Shortness of breath  Adult Transthoracic Echo Complete W/ Cont if Necessary Per Protocol    Stress Test With Pet Myocardial Perfusion (Multi-Study)          Plan:       72-year-old gentleman who presents for surgical clearance.  Patient is going to have a neurologic surgery on L5.  In light of his abnormal EKG his inability to ambulate a long distance in the past several months I am going to set him up for both an echocardiogram as well as a nuclear perfusion study.  He has had sleep apnea for many years wears CPAP machine.  That is another reason why I would check his echo which she has never had done.  If these studies are normal we will clear him for surgery and follow-up with him in about a year.

## 2018-05-15 ENCOUNTER — HOSPITAL ENCOUNTER (OUTPATIENT)
Dept: CARDIOLOGY | Facility: HOSPITAL | Age: 73
Discharge: HOME | End: 2018-05-15
Attending: INTERNAL MEDICINE

## 2018-05-15 ENCOUNTER — HOSPITAL ENCOUNTER (OUTPATIENT)
Dept: CARDIOLOGY | Facility: HOSPITAL | Age: 73
Discharge: HOME | End: 2018-05-15
Attending: INTERNAL MEDICINE | Admitting: INTERNAL MEDICINE

## 2018-05-15 ENCOUNTER — TRANSCRIBE ORDERS (OUTPATIENT)
Dept: CARDIOLOGY | Facility: CLINIC | Age: 73
End: 2018-05-15

## 2018-05-15 ENCOUNTER — LAB (OUTPATIENT)
Dept: LAB | Facility: HOSPITAL | Age: 73
End: 2018-05-15
Attending: INTERNAL MEDICINE

## 2018-05-15 VITALS
WEIGHT: 275 LBS | DIASTOLIC BLOOD PRESSURE: 56 MMHG | HEART RATE: 74 BPM | HEIGHT: 68 IN | SYSTOLIC BLOOD PRESSURE: 112 MMHG | BODY MASS INDEX: 41.68 KG/M2

## 2018-05-15 DIAGNOSIS — R94.39 ABNORMAL NUCLEAR STRESS TEST: Primary | ICD-10-CM

## 2018-05-15 DIAGNOSIS — R06.02 SHORTNESS OF BREATH: ICD-10-CM

## 2018-05-15 DIAGNOSIS — R94.31 ABNORMAL ECG: ICD-10-CM

## 2018-05-15 DIAGNOSIS — Z01.810 PRE-OPERATIVE CARDIOVASCULAR EXAMINATION: Primary | ICD-10-CM

## 2018-05-15 DIAGNOSIS — Z13.6 SCREENING FOR ISCHEMIC HEART DISEASE: ICD-10-CM

## 2018-05-15 DIAGNOSIS — Z01.810 PRE-OPERATIVE CARDIOVASCULAR EXAMINATION: ICD-10-CM

## 2018-05-15 LAB
ANION GAP SERPL CALCULATED.3IONS-SCNC: 17.5 MMOL/L
ASCENDING AORTA: 3 CM
BASOPHILS # BLD AUTO: 0.01 10*3/MM3 (ref 0–0.2)
BASOPHILS NFR BLD AUTO: 0.1 % (ref 0–1.5)
BH CV ECHO MEAS - ACS: 1.9 CM
BH CV ECHO MEAS - AO MAX PG (FULL): 7.4 MMHG
BH CV ECHO MEAS - AO MAX PG: 10.8 MMHG
BH CV ECHO MEAS - AO MEAN PG (FULL): 4.2 MMHG
BH CV ECHO MEAS - AO MEAN PG: 6.3 MMHG
BH CV ECHO MEAS - AO ROOT AREA (BSA CORRECTED): 1.3
BH CV ECHO MEAS - AO ROOT AREA: 7.4 CM^2
BH CV ECHO MEAS - AO ROOT DIAM: 3.1 CM
BH CV ECHO MEAS - AO V2 MAX: 164.2 CM/SEC
BH CV ECHO MEAS - AO V2 MEAN: 120.2 CM/SEC
BH CV ECHO MEAS - AO V2 VTI: 35.1 CM
BH CV ECHO MEAS - AVA(I,A): 2 CM^2
BH CV ECHO MEAS - AVA(I,D): 2 CM^2
BH CV ECHO MEAS - AVA(V,A): 2.1 CM^2
BH CV ECHO MEAS - AVA(V,D): 2.1 CM^2
BH CV ECHO MEAS - BSA(HAYCOCK): 2.5 M^2
BH CV ECHO MEAS - BSA: 2.3 M^2
BH CV ECHO MEAS - BZI_BMI: 41.8 KILOGRAMS/M^2
BH CV ECHO MEAS - BZI_METRIC_HEIGHT: 172.7 CM
BH CV ECHO MEAS - BZI_METRIC_WEIGHT: 124.7 KG
BH CV ECHO MEAS - CONTRAST EF (2CH): 56.3 ML/M^2
BH CV ECHO MEAS - CONTRAST EF 4CH: 67 ML/M^2
BH CV ECHO MEAS - EDV(MOD-SP2): 96 ML
BH CV ECHO MEAS - EDV(MOD-SP4): 109 ML
BH CV ECHO MEAS - EDV(TEICH): 137.9 ML
BH CV ECHO MEAS - EF(CUBED): 68.3 %
BH CV ECHO MEAS - EF(MOD-BP): 61 %
BH CV ECHO MEAS - EF(MOD-SP2): 56.3 %
BH CV ECHO MEAS - EF(MOD-SP4): 67 %
BH CV ECHO MEAS - EF(TEICH): 59.4 %
BH CV ECHO MEAS - ESV(MOD-SP2): 42 ML
BH CV ECHO MEAS - ESV(MOD-SP4): 36 ML
BH CV ECHO MEAS - ESV(TEICH): 56 ML
BH CV ECHO MEAS - FS: 31.8 %
BH CV ECHO MEAS - IVS/LVPW: 1.1
BH CV ECHO MEAS - IVSD: 1.4 CM
BH CV ECHO MEAS - LAT PEAK E' VEL: 7 CM/SEC
BH CV ECHO MEAS - LV DIASTOLIC VOL/BSA (35-75): 46.6 ML/M^2
BH CV ECHO MEAS - LV MASS(C)D: 302.2 GRAMS
BH CV ECHO MEAS - LV MASS(C)DI: 129.1 GRAMS/M^2
BH CV ECHO MEAS - LV MAX PG: 3.4 MMHG
BH CV ECHO MEAS - LV MEAN PG: 2.2 MMHG
BH CV ECHO MEAS - LV SYSTOLIC VOL/BSA (12-30): 15.4 ML/M^2
BH CV ECHO MEAS - LV V1 MAX: 92.6 CM/SEC
BH CV ECHO MEAS - LV V1 MEAN: 70.4 CM/SEC
BH CV ECHO MEAS - LV V1 VTI: 18.8 CM
BH CV ECHO MEAS - LVIDD: 5.3 CM
BH CV ECHO MEAS - LVIDS: 3.6 CM
BH CV ECHO MEAS - LVLD AP2: 7.1 CM
BH CV ECHO MEAS - LVLD AP4: 7.4 CM
BH CV ECHO MEAS - LVLS AP2: 6.3 CM
BH CV ECHO MEAS - LVLS AP4: 6.1 CM
BH CV ECHO MEAS - LVOT AREA (M): 3.8 CM^2
BH CV ECHO MEAS - LVOT AREA: 3.7 CM^2
BH CV ECHO MEAS - LVOT DIAM: 2.2 CM
BH CV ECHO MEAS - LVPWD: 1.3 CM
BH CV ECHO MEAS - MED PEAK E' VEL: 9 CM/SEC
BH CV ECHO MEAS - MV A DUR: 0.14 SEC
BH CV ECHO MEAS - MV A MAX VEL: 93.1 CM/SEC
BH CV ECHO MEAS - MV DEC SLOPE: 326.9 CM/SEC^2
BH CV ECHO MEAS - MV DEC TIME: 0.23 SEC
BH CV ECHO MEAS - MV E MAX VEL: 76.2 CM/SEC
BH CV ECHO MEAS - MV E/A: 0.82
BH CV ECHO MEAS - MV MAX PG: 3.8 MMHG
BH CV ECHO MEAS - MV MEAN PG: 1.3 MMHG
BH CV ECHO MEAS - MV P1/2T MAX VEL: 78.1 CM/SEC
BH CV ECHO MEAS - MV P1/2T: 70 MSEC
BH CV ECHO MEAS - MV V2 MAX: 97.7 CM/SEC
BH CV ECHO MEAS - MV V2 MEAN: 53.9 CM/SEC
BH CV ECHO MEAS - MV V2 VTI: 27.3 CM
BH CV ECHO MEAS - MVA P1/2T LCG: 2.8 CM^2
BH CV ECHO MEAS - MVA(P1/2T): 3.1 CM^2
BH CV ECHO MEAS - MVA(VTI): 2.6 CM^2
BH CV ECHO MEAS - PA ACC TIME: 0.13 SEC
BH CV ECHO MEAS - PA MAX PG (FULL): 2.3 MMHG
BH CV ECHO MEAS - PA MAX PG: 3.3 MMHG
BH CV ECHO MEAS - PA PR(ACCEL): 18.8 MMHG
BH CV ECHO MEAS - PA V2 MAX: 90.9 CM/SEC
BH CV ECHO MEAS - PULM A REVS DUR: 0.08 SEC
BH CV ECHO MEAS - PULM A REVS VEL: 20.6 CM/SEC
BH CV ECHO MEAS - PULM DIAS VEL: 35.1 CM/SEC
BH CV ECHO MEAS - PULM S/D: 0.8
BH CV ECHO MEAS - PULM SYS VEL: 28.3 CM/SEC
BH CV ECHO MEAS - PVA(V,A): 2.1 CM^2
BH CV ECHO MEAS - PVA(V,D): 2.1 CM^2
BH CV ECHO MEAS - QP/QS: 0.65
BH CV ECHO MEAS - RV MAX PG: 1 MMHG
BH CV ECHO MEAS - RV MEAN PG: 0.51 MMHG
BH CV ECHO MEAS - RV V1 MAX: 50 CM/SEC
BH CV ECHO MEAS - RV V1 MEAN: 32.9 CM/SEC
BH CV ECHO MEAS - RV V1 VTI: 11.9 CM
BH CV ECHO MEAS - RVOT AREA: 3.8 CM^2
BH CV ECHO MEAS - RVOT DIAM: 2.2 CM
BH CV ECHO MEAS - SI(AO): 110.5 ML/M^2
BH CV ECHO MEAS - SI(CUBED): 44.5 ML/M^2
BH CV ECHO MEAS - SI(LVOT): 30 ML/M^2
BH CV ECHO MEAS - SI(MOD-SP2): 23.1 ML/M^2
BH CV ECHO MEAS - SI(MOD-SP4): 31.2 ML/M^2
BH CV ECHO MEAS - SI(TEICH): 35 ML/M^2
BH CV ECHO MEAS - SV(AO): 258.6 ML
BH CV ECHO MEAS - SV(CUBED): 104.2 ML
BH CV ECHO MEAS - SV(LVOT): 70.1 ML
BH CV ECHO MEAS - SV(MOD-SP2): 54 ML
BH CV ECHO MEAS - SV(MOD-SP4): 73 ML
BH CV ECHO MEAS - SV(RVOT): 45.8 ML
BH CV ECHO MEAS - SV(TEICH): 81.9 ML
BH CV ECHO MEAS - TAPSE (>1.6): 2.8 CM2
BH CV ECHO MEASUREMENTS AVERAGE E/E' RATIO: 9.53
BH CV NUCLEAR PRIOR STUDY: 2
BH CV STRESS BP STAGE 1: NORMAL
BH CV STRESS COMMENTS STAGE 1: NORMAL
BH CV STRESS DOSE REGADENOSON STAGE 1: 0.4
BH CV STRESS DURATION MIN STAGE 1: 0
BH CV STRESS DURATION SEC STAGE 1: 10
BH CV STRESS HR STAGE 1: 93
BH CV STRESS PROTOCOL 1: NORMAL
BH CV STRESS RECOVERY BP: NORMAL MMHG
BH CV STRESS RECOVERY HR: 82 BPM
BH CV STRESS STAGE 1: 1
BH CV XLRA - RV BASE: 3.2 CM
BH CV XLRA - TDI S': 16 CM/SEC
BUN BLD-MCNC: 18 MG/DL (ref 8–23)
BUN/CREAT SERPL: 20.2 (ref 7–25)
CALCIUM SPEC-SCNC: 9.9 MG/DL (ref 8.6–10.5)
CHLORIDE SERPL-SCNC: 97 MMOL/L (ref 98–107)
CO2 SERPL-SCNC: 23.5 MMOL/L (ref 22–29)
CREAT BLD-MCNC: 0.89 MG/DL (ref 0.76–1.27)
DEPRECATED RDW RBC AUTO: 43.8 FL (ref 37–54)
EOSINOPHIL # BLD AUTO: 0.13 10*3/MM3 (ref 0–0.7)
EOSINOPHIL NFR BLD AUTO: 1.4 % (ref 0.3–6.2)
ERYTHROCYTE [DISTWIDTH] IN BLOOD BY AUTOMATED COUNT: 13.5 % (ref 11.5–14.5)
GFR SERPL CREATININE-BSD FRML MDRD: 84 ML/MIN/1.73
GLUCOSE BLD-MCNC: 251 MG/DL (ref 65–99)
HCT VFR BLD AUTO: 42 % (ref 40.4–52.2)
HGB BLD-MCNC: 13.9 G/DL (ref 13.7–17.6)
IMM GRANULOCYTES # BLD: 0.03 10*3/MM3 (ref 0–0.03)
IMM GRANULOCYTES NFR BLD: 0.3 % (ref 0–0.5)
LEFT ATRIUM VOLUME INDEX: 18 ML/M2
LV EF 2D ECHO EST: 61 %
LV EF NUC BP: 56 %
LYMPHOCYTES # BLD AUTO: 2.48 10*3/MM3 (ref 0.9–4.8)
LYMPHOCYTES NFR BLD AUTO: 27.6 % (ref 19.6–45.3)
MAXIMAL PREDICTED HEART RATE: 149 BPM
MAXIMAL PREDICTED HEART RATE: 149 BPM
MCH RBC QN AUTO: 29.7 PG (ref 27–32.7)
MCHC RBC AUTO-ENTMCNC: 33.1 G/DL (ref 32.6–36.4)
MCV RBC AUTO: 89.7 FL (ref 79.8–96.2)
MONOCYTES # BLD AUTO: 0.63 10*3/MM3 (ref 0.2–1.2)
MONOCYTES NFR BLD AUTO: 7 % (ref 5–12)
NEUTROPHILS # BLD AUTO: 5.74 10*3/MM3 (ref 1.9–8.1)
NEUTROPHILS NFR BLD AUTO: 63.9 % (ref 42.7–76)
PERCENT MAX PREDICTED HR: 62.42 %
PLATELET # BLD AUTO: 254 10*3/MM3 (ref 140–500)
PMV BLD AUTO: 10.4 FL (ref 6–12)
POTASSIUM BLD-SCNC: 3.7 MMOL/L (ref 3.5–5.2)
RBC # BLD AUTO: 4.68 10*6/MM3 (ref 4.6–6)
SINUS: 3.2 CM
SODIUM BLD-SCNC: 138 MMOL/L (ref 136–145)
STJ: 2.6 CM
STRESS BASELINE BP: NORMAL MMHG
STRESS BASELINE HR: 71 BPM
STRESS PERCENT HR: 73 %
STRESS POST EXERCISE DUR SEC: 10 SEC
STRESS POST PEAK BP: NORMAL MMHG
STRESS POST PEAK HR: 93 BPM
STRESS TARGET HR: 127 BPM
STRESS TARGET HR: 127 BPM
WBC NRBC COR # BLD: 8.99 10*3/MM3 (ref 4.5–10.7)

## 2018-05-15 PROCEDURE — 80048 BASIC METABOLIC PNL TOTAL CA: CPT

## 2018-05-15 PROCEDURE — A9555 RB82 RUBIDIUM: HCPCS | Performed by: INTERNAL MEDICINE

## 2018-05-15 PROCEDURE — 78492 MYOCRD IMG PET MLT RST&STRS: CPT

## 2018-05-15 PROCEDURE — 25010000002 PERFLUTREN (DEFINITY) 8.476 MG IN SODIUM CHLORIDE 0.9 % 10 ML INJECTION: Performed by: INTERNAL MEDICINE

## 2018-05-15 PROCEDURE — 93018 CV STRESS TEST I&R ONLY: CPT | Performed by: INTERNAL MEDICINE

## 2018-05-15 PROCEDURE — 36415 COLL VENOUS BLD VENIPUNCTURE: CPT

## 2018-05-15 PROCEDURE — 93017 CV STRESS TEST TRACING ONLY: CPT

## 2018-05-15 PROCEDURE — 93306 TTE W/DOPPLER COMPLETE: CPT | Performed by: INTERNAL MEDICINE

## 2018-05-15 PROCEDURE — 78492 MYOCRD IMG PET MLT RST&STRS: CPT | Performed by: INTERNAL MEDICINE

## 2018-05-15 PROCEDURE — 0 RUBIDIUM CHLORIDE: Performed by: INTERNAL MEDICINE

## 2018-05-15 PROCEDURE — 93016 CV STRESS TEST SUPVJ ONLY: CPT | Performed by: INTERNAL MEDICINE

## 2018-05-15 PROCEDURE — 85025 COMPLETE CBC W/AUTO DIFF WBC: CPT

## 2018-05-15 PROCEDURE — 25010000002 REGADENOSON 0.4 MG/5ML SOLUTION: Performed by: INTERNAL MEDICINE

## 2018-05-15 PROCEDURE — 93306 TTE W/DOPPLER COMPLETE: CPT

## 2018-05-15 RX ADMIN — PERFLUTREN 1.5 ML: 6.52 INJECTION, SUSPENSION INTRAVENOUS at 12:00

## 2018-05-15 RX ADMIN — RUBIDIUM CHLORIDE RB-82 1 DOSE: 150 INJECTION, SOLUTION INTRAVENOUS at 11:40

## 2018-05-15 RX ADMIN — RUBIDIUM CHLORIDE RB-82 1 DOSE: 150 INJECTION, SOLUTION INTRAVENOUS at 11:30

## 2018-05-15 RX ADMIN — REGADENOSON 0.4 MG: 0.08 INJECTION, SOLUTION INTRAVENOUS at 11:40

## 2018-05-16 NOTE — PERIOPERATIVE NURSING NOTE
CALLED AND SPOKE WITH PT CONFIRMING ARRIVAL TIME AND NEED TO BE NPO AFTER MN. MAY TAKE AM MEDS WITH A SIP OF WATER EXCLUDING ANY DIABETIC OR DIURETIC MEDS. WIFE TO ACCOMPANY PT TO HOSPITAL IN THE AM

## 2018-05-17 ENCOUNTER — HOSPITAL ENCOUNTER (OUTPATIENT)
Facility: HOSPITAL | Age: 73
Setting detail: HOSPITAL OUTPATIENT SURGERY
Discharge: HOME | End: 2018-05-17
Attending: INTERNAL MEDICINE | Admitting: INTERNAL MEDICINE

## 2018-05-17 VITALS
RESPIRATION RATE: 18 BRPM | WEIGHT: 275 LBS | SYSTOLIC BLOOD PRESSURE: 148 MMHG | HEIGHT: 68 IN | HEART RATE: 76 BPM | OXYGEN SATURATION: 97 % | BODY MASS INDEX: 41.68 KG/M2 | TEMPERATURE: 97.2 F | DIASTOLIC BLOOD PRESSURE: 81 MMHG

## 2018-05-17 DIAGNOSIS — R94.39 ABNORMAL NUCLEAR STRESS TEST: ICD-10-CM

## 2018-05-17 LAB
ACT BLD: 252 SECONDS (ref 82–152)
GLUCOSE BLDC GLUCOMTR-MCNC: 190 MG/DL (ref 70–130)
GLUCOSE BLDC GLUCOMTR-MCNC: 236 MG/DL (ref 70–130)

## 2018-05-17 PROCEDURE — 99152 MOD SED SAME PHYS/QHP 5/>YRS: CPT | Performed by: INTERNAL MEDICINE

## 2018-05-17 PROCEDURE — C1894 INTRO/SHEATH, NON-LASER: HCPCS | Performed by: INTERNAL MEDICINE

## 2018-05-17 PROCEDURE — 82962 GLUCOSE BLOOD TEST: CPT

## 2018-05-17 PROCEDURE — C1725 CATH, TRANSLUMIN NON-LASER: HCPCS | Performed by: INTERNAL MEDICINE

## 2018-05-17 PROCEDURE — C9600 PERC DRUG-EL COR STENT SING: HCPCS | Performed by: INTERNAL MEDICINE

## 2018-05-17 PROCEDURE — 25010000002 FENTANYL CITRATE (PF) 100 MCG/2ML SOLUTION: Performed by: INTERNAL MEDICINE

## 2018-05-17 PROCEDURE — 25010000002 MIDAZOLAM PER 1 MG: Performed by: INTERNAL MEDICINE

## 2018-05-17 PROCEDURE — C1769 GUIDE WIRE: HCPCS | Performed by: INTERNAL MEDICINE

## 2018-05-17 PROCEDURE — C1874 STENT, COATED/COV W/DEL SYS: HCPCS | Performed by: INTERNAL MEDICINE

## 2018-05-17 PROCEDURE — C1887 CATHETER, GUIDING: HCPCS | Performed by: INTERNAL MEDICINE

## 2018-05-17 PROCEDURE — 93454 CORONARY ARTERY ANGIO S&I: CPT | Performed by: INTERNAL MEDICINE

## 2018-05-17 PROCEDURE — 0 IOPAMIDOL PER 1 ML: Performed by: INTERNAL MEDICINE

## 2018-05-17 PROCEDURE — 25010000002 HEPARIN (PORCINE) PER 1000 UNITS: Performed by: INTERNAL MEDICINE

## 2018-05-17 PROCEDURE — 99153 MOD SED SAME PHYS/QHP EA: CPT | Performed by: INTERNAL MEDICINE

## 2018-05-17 PROCEDURE — 85347 COAGULATION TIME ACTIVATED: CPT

## 2018-05-17 PROCEDURE — 92928 PRQ TCAT PLMT NTRAC ST 1 LES: CPT | Performed by: INTERNAL MEDICINE

## 2018-05-17 DEVICE — XIENCE ALPINE EVEROLIMUS ELUTING CORONARY STENT SYSTEM 2.50 MM X 38 MM / RAPID-EXCHANGE
Type: IMPLANTABLE DEVICE | Status: FUNCTIONAL
Brand: XIENCE ALPINE

## 2018-05-17 RX ORDER — ALPRAZOLAM 0.25 MG/1
1 TABLET ORAL 2 TIMES DAILY PRN
Status: DISCONTINUED | OUTPATIENT
Start: 2018-05-17 | End: 2018-05-17 | Stop reason: HOSPADM

## 2018-05-17 RX ORDER — HYDROCODONE BITARTRATE AND ACETAMINOPHEN 5; 325 MG/1; MG/1
1 TABLET ORAL EVERY 4 HOURS PRN
Status: DISCONTINUED | OUTPATIENT
Start: 2018-05-17 | End: 2018-05-17 | Stop reason: HOSPADM

## 2018-05-17 RX ORDER — FUROSEMIDE 80 MG
80 TABLET ORAL DAILY
Start: 2018-05-17

## 2018-05-17 RX ORDER — ASPIRIN 81 MG/1
TABLET, CHEWABLE ORAL AS NEEDED
Status: DISCONTINUED | OUTPATIENT
Start: 2018-05-17 | End: 2018-05-17 | Stop reason: HOSPADM

## 2018-05-17 RX ORDER — NITROGLYCERIN 0.4 MG/1
0.4 TABLET SUBLINGUAL
Status: DISCONTINUED | OUTPATIENT
Start: 2018-05-17 | End: 2018-05-17 | Stop reason: HOSPADM

## 2018-05-17 RX ORDER — FENTANYL CITRATE 50 UG/ML
INJECTION, SOLUTION INTRAMUSCULAR; INTRAVENOUS AS NEEDED
Status: DISCONTINUED | OUTPATIENT
Start: 2018-05-17 | End: 2018-05-17 | Stop reason: HOSPADM

## 2018-05-17 RX ORDER — SODIUM CHLORIDE 9 MG/ML
100 INJECTION, SOLUTION INTRAVENOUS CONTINUOUS
Status: DISCONTINUED | OUTPATIENT
Start: 2018-05-17 | End: 2018-05-17 | Stop reason: HOSPADM

## 2018-05-17 RX ORDER — CLOPIDOGREL BISULFATE 75 MG/1
TABLET ORAL AS NEEDED
Status: DISCONTINUED | OUTPATIENT
Start: 2018-05-17 | End: 2018-05-17 | Stop reason: HOSPADM

## 2018-05-17 RX ORDER — PROMETHAZINE HYDROCHLORIDE 25 MG/ML
12.5 INJECTION, SOLUTION INTRAMUSCULAR; INTRAVENOUS EVERY 4 HOURS PRN
Status: DISCONTINUED | OUTPATIENT
Start: 2018-05-17 | End: 2018-05-17 | Stop reason: HOSPADM

## 2018-05-17 RX ORDER — METOCLOPRAMIDE HYDROCHLORIDE 5 MG/ML
10 INJECTION INTRAMUSCULAR; INTRAVENOUS EVERY 6 HOURS PRN
Status: DISCONTINUED | OUTPATIENT
Start: 2018-05-17 | End: 2018-05-17 | Stop reason: HOSPADM

## 2018-05-17 RX ORDER — FENTANYL CITRATE 50 UG/ML
50 INJECTION, SOLUTION INTRAMUSCULAR; INTRAVENOUS
Status: DISCONTINUED | OUTPATIENT
Start: 2018-05-17 | End: 2018-05-17 | Stop reason: HOSPADM

## 2018-05-17 RX ORDER — ACETAMINOPHEN 325 MG/1
650 TABLET ORAL EVERY 4 HOURS PRN
Status: DISCONTINUED | OUTPATIENT
Start: 2018-05-17 | End: 2018-05-17 | Stop reason: HOSPADM

## 2018-05-17 RX ORDER — LIDOCAINE HYDROCHLORIDE 20 MG/ML
INJECTION, SOLUTION INFILTRATION; PERINEURAL AS NEEDED
Status: DISCONTINUED | OUTPATIENT
Start: 2018-05-17 | End: 2018-05-17 | Stop reason: HOSPADM

## 2018-05-17 RX ORDER — MIDAZOLAM HYDROCHLORIDE 1 MG/ML
1 INJECTION INTRAMUSCULAR; INTRAVENOUS
Status: DISCONTINUED | OUTPATIENT
Start: 2018-05-17 | End: 2018-05-17 | Stop reason: HOSPADM

## 2018-05-17 RX ORDER — SODIUM CHLORIDE 0.9 % (FLUSH) 0.9 %
1-10 SYRINGE (ML) INJECTION AS NEEDED
Status: DISCONTINUED | OUTPATIENT
Start: 2018-05-17 | End: 2018-05-17 | Stop reason: HOSPADM

## 2018-05-17 RX ORDER — MIDAZOLAM HYDROCHLORIDE 1 MG/ML
INJECTION INTRAMUSCULAR; INTRAVENOUS AS NEEDED
Status: DISCONTINUED | OUTPATIENT
Start: 2018-05-17 | End: 2018-05-17 | Stop reason: HOSPADM

## 2018-05-17 RX ORDER — SODIUM CHLORIDE 9 MG/ML
75 INJECTION, SOLUTION INTRAVENOUS CONTINUOUS
Status: DISCONTINUED | OUTPATIENT
Start: 2018-05-17 | End: 2018-05-17 | Stop reason: HOSPADM

## 2018-05-17 RX ORDER — ONDANSETRON 2 MG/ML
4 INJECTION INTRAMUSCULAR; INTRAVENOUS EVERY 6 HOURS PRN
Status: DISCONTINUED | OUTPATIENT
Start: 2018-05-17 | End: 2018-05-17 | Stop reason: HOSPADM

## 2018-05-17 RX ORDER — CLOPIDOGREL BISULFATE 75 MG/1
75 TABLET ORAL DAILY
Qty: 30 TABLET | Refills: 11 | Status: ON HOLD | OUTPATIENT
Start: 2018-05-17 | End: 2019-02-28 | Stop reason: SDUPTHER

## 2018-05-17 RX ORDER — LIDOCAINE HYDROCHLORIDE 10 MG/ML
0.1 INJECTION, SOLUTION EPIDURAL; INFILTRATION; INTRACAUDAL; PERINEURAL ONCE AS NEEDED
Status: DISCONTINUED | OUTPATIENT
Start: 2018-05-17 | End: 2018-05-17 | Stop reason: HOSPADM

## 2018-05-17 RX ORDER — HEPARIN SODIUM 1000 [USP'U]/ML
INJECTION, SOLUTION INTRAVENOUS; SUBCUTANEOUS AS NEEDED
Status: DISCONTINUED | OUTPATIENT
Start: 2018-05-17 | End: 2018-05-17 | Stop reason: HOSPADM

## 2018-05-17 RX ORDER — SODIUM CHLORIDE 0.9 % (FLUSH) 0.9 %
1-10 SYRINGE (ML) INJECTION AS NEEDED
Status: CANCELLED | OUTPATIENT
Start: 2018-05-17

## 2018-05-17 RX ADMIN — SODIUM CHLORIDE 75 ML/HR: 9 INJECTION, SOLUTION INTRAVENOUS at 09:16

## 2018-05-17 NOTE — DISCHARGE INSTRUCTIONS
[facility]  [address] [place] [address]    Coronary Angiogram with Stent (Radial Approach) After Care    Refer to this sheet in the next few weeks. These instructions provide you with information on caring for yourself after your procedure. Your health care provider may also give you more specific instructions. Your treatment has been planned according to current medical practices, but problems sometimes occur. Call your health care provider if you have any problems or questions after your procedure.       Home Care Instructions:  · You may shower the day after the procedure. Remove the bandage (dressing) and gently wash the site with plain soap and water. Gently pat the site dry. You may apply a band aid daily for 2 days if desired.    · Do not apply powder or lotion to the site.  · Do not submerge the affected site in water for 3 to 5 days or until the site is completely healed.   · Do not flex or bend the affected arm for 24 hours.  · Do not lift, push or pull anything over 10 pounds for 2 days after your procedure.  · Do not operate machinery or power tools for 24 hours.  · Inspect the site at least twice daily. You may notice some bruising at the site and it may be tender for 1 to 2 weeks.     · Increase your fluid intake for the next 2 days.    · Keep arm elevated for 24 hours.  For the remainder of the day, keep your arm in the “Pledge of Allegiance” position when up and about.    · Limit your activity for the next 48 hours and avoid strenuous activity as directed by your physician.   · Cardiac Rehab may or may not be ordered.  Please consult with your physician  · You may drive 24 hours after the procedure unless otherwise instructed by your caregiver.  · A responsible adult should be with you for the first 24 hours after you arrive home.   · Do not make any important legal decisions or sign legal papers for 24 hours. Do not drink alcohol for 24 hours.    · Take medicines only as  directed by your health care provider.  Blood thinners may be prescribed after your procedure to improve blood flow through the stent.    · Metformin or any medications containing Metformin should not be taken for 48 hours after your procedure.    · Eat a heart-healthy diet. This should include plenty of fresh fruits and vegetables. Meat should be lean cuts. Avoid the following types of food:    ¨ Food that is high in salt.    ¨ Canned or highly processed food.    ¨ Food that is high in saturated fat or sugar.    ¨ Fried food.    · Make any other lifestyle changes recommended by your health care provider. This may include:    ¨ Not using any tobacco products including cigarettes, chewing tobacco, or electronic cigarettes.   ¨ Managing your weight.    ¨ Getting regular exercise.    ¨ Managing your blood pressure.    ¨ Limiting your alcohol intake.    ¨ Managing other health problems, such as diabetes.    · If you need an MRI after your heart stent was placed, be sure to tell the health care provider who orders the MRI that you have a heart stent.    · Keep all follow-up visits as directed by your health care provider.    ·   Call Your Doctor If:  · You have unusual pain at the radial/ulnar (wrist) site.  · You have redness, warmth, swelling, or pain at the radial/ulnar (wrist) site.  · You have drainage (other than a small amount of blood on the dressing).  · `You have chills or a fever > 101.  · Your arm becomes pale or dark, cool, tingly, or numb.  · You develop chest pain, shortness of breath, feel faint or pass out.    · You have heavy bleeding from the site, hold pressure on the site for 20 minutes.  If the bleeding stops, apply a fresh bandage and call your cardiologist.  However, if you continue to have bleeding, call 911.        Make Sure You:   · Understand these instructions.  · Will watch your condition.  · Will get help right away if you are not doing well or get worse.

## 2018-05-17 NOTE — INTERVAL H&P NOTE
H&P updated. The patient was examined and the following changes are noted:  stress test with anterior ischemia.  For cardiac catheterization today.

## 2018-05-25 ENCOUNTER — OFFICE VISIT (OUTPATIENT)
Dept: CARDIOLOGY | Facility: CLINIC | Age: 73
End: 2018-05-25

## 2018-05-25 VITALS
OXYGEN SATURATION: 100 % | WEIGHT: 270 LBS | HEART RATE: 72 BPM | DIASTOLIC BLOOD PRESSURE: 56 MMHG | HEIGHT: 68 IN | BODY MASS INDEX: 40.92 KG/M2 | SYSTOLIC BLOOD PRESSURE: 120 MMHG

## 2018-05-25 DIAGNOSIS — Z95.5 HISTORY OF CORONARY ARTERY STENT PLACEMENT: ICD-10-CM

## 2018-05-25 DIAGNOSIS — E66.01 MORBID OBESITY WITH BMI OF 40.0-44.9, ADULT (HCC): ICD-10-CM

## 2018-05-25 DIAGNOSIS — I25.10 CORONARY ARTERY DISEASE INVOLVING NATIVE CORONARY ARTERY OF NATIVE HEART WITHOUT ANGINA PECTORIS: Primary | ICD-10-CM

## 2018-05-25 PROCEDURE — 93000 ELECTROCARDIOGRAM COMPLETE: CPT | Performed by: PHYSICIAN ASSISTANT

## 2018-05-25 PROCEDURE — 99214 OFFICE O/P EST MOD 30 MIN: CPT | Performed by: PHYSICIAN ASSISTANT

## 2018-05-25 NOTE — PROGRESS NOTES
Date of Office Visit: 2018  Encounter Provider: CORBIN Morales  Place of Service: [facility] CARDIOLOGY  Patient Name: Will Garcia  :1945    Chief Complaint   Patient presents with   • Coronary Artery Disease     1 week hospital follow up   :     HPI: Will Garcia is a 72 y.o. male, new to me, who presents today for follow-up.  Old records have been obtained and reviewed by me.  He is a patient who was first evaluated by Dr. Anderson on 2018 for cardiac clearance prior to surgery.  He had a left anterior fascicular block and an incomplete right bundle ezio block on his ECG.  He had multiple cardiac risk factors, and was referred for an echocardiogram and a nuclear stress test.  The echocardiogram was normal, however the stress test showed ischemia and he was referred for cardiac catheterization on 2018.  This showed a normal left main, 30% proximal LAD, 95% mid LAD, 30% mid circumflex, and a 40-50% RCA lesion.  He underwent successful drug-eluting stent placement to the LAD lesion.  Recommendations were for dual antiplatelet therapy without interruption for at least 3-6 months before his lumbar spinal surgery and a total duration of dual antiplatelet therapy for at least 1 year.  He is here today for follow-up.   Since she's been home he's been doing well.  He has chronic shortness of breath on exertion, but denies any chest pain, palpitations, edema, dizziness, or syncope.  His primary care physician is at the [facility] and recently started him on Pravachol.  He has had an intolerance to other statins in the past.  He also recently changed his diabetes medication, which brought his hemoglobin A1c from 10 down to 8.4.      Past Medical History:   Diagnosis Date   • Arthritis    • Chronic pain disorder    • Edema    • Essential hypertension    • Hard to intubate    • Kidney stones    • Low back pain    • Lumbosacral disc disease    • Morbid obesity with  BMI of 40.0-44.9, adult    • HELEN (obstructive sleep apnea)     CPAP   • Spinal stenosis    • Type 2 diabetes mellitus with diabetic neuropathy, with long-term current use of insulin        Past Surgical History:   Procedure Laterality Date   • CARDIAC CATHETERIZATION Left 5/17/2018    Procedure: Cardiac Catheterization/Vascular Study;  Surgeon: Kaitlin Barney MD;  Location:  [facility];  Service: Cardiovascular   • CARDIAC CATHETERIZATION N/A 5/17/2018    Procedure: Stent LACEY coronary;  Surgeon: Kaitlin Barney MD;  Location:  [facility];  Service: Cardiovascular   • CATARACT EXTRACTION     • COLONOSCOPY N/A 4/3/2018    Procedure: COLONOSCOPY TO CECUM;  Surgeon: Royal Metcalf MD;  Location:  [facility];  Service: General   • EPIDURAL BLOCK     • KIDNEY STONE SURGERY     • SHOULDER ARTHROSCOPY Left    • TONSILLECTOMY         Social History     Social History   • Marital status:      Spouse name: N/A   • Number of children: N/A   • Years of education: N/A     Occupational History   • Not on file.     Social History Main Topics   • Smoking status: Never Smoker   • Smokeless tobacco: Never Used      Comment: Caffeine - 1-2 daily   • Alcohol use 0.6 oz/week     1 Glasses of wine per week      Comment: Once yearly   • Drug use: No   • Sexual activity: Defer     Other Topics Concern   • Not on file     Social History Narrative   • No narrative on file       Family History   Problem Relation Age of Onset   • Diabetes Other    • Stroke Other    • Heart disease Father    • Diabetes Maternal Grandmother    • Diabetes Maternal Grandfather    • No Known Problems Paternal Grandmother    • No Known Problems Paternal Grandfather        Review of Systems   Constitution: Negative for chills, fever and malaise/fatigue.   Cardiovascular: Positive for dyspnea on exertion. Negative for chest pain, leg swelling, near-syncope, orthopnea, palpitations, paroxysmal nocturnal dyspnea and  syncope.   Respiratory: Negative for cough and shortness of breath.    Musculoskeletal: Negative for joint pain, joint swelling and myalgias.   Gastrointestinal: Negative for abdominal pain, diarrhea, melena, nausea and vomiting.   Genitourinary: Negative for frequency and hematuria.   Neurological: Negative for light-headedness, numbness, paresthesias and seizures.   Allergic/Immunologic: Negative.    All other systems reviewed and are negative.      Allergies   Allergen Reactions   • Ceclor [Cefaclor] Swelling         Current Outpatient Prescriptions:   •  allopurinol (ZYLOPRIM) 100 MG tablet, Take 100 mg by mouth Every Night., Disp: , Rfl:   •  aspirin 81 MG EC tablet, Take 81 mg by mouth Every Night., Disp: , Rfl:   •  clopidogrel (PLAVIX) 75 MG tablet, Take 1 tablet by mouth Daily., Disp: 30 tablet, Rfl: 11  •  Dulaglutide (TRULICITY SC), Inject 1 mL under the skin 1 (One) Time Per Week. TAKES EVERY TUESDAY, Disp: , Rfl:   •  fluticasone (FLONASE) 50 MCG/ACT nasal spray, 2 sprays into each nostril As Needed. Administer 2 sprays in each nostril for each dose. , Disp: , Rfl:   •  furosemide (LASIX) 80 MG tablet, Take 1 tablet by mouth Daily. Resume on 5/18, Disp: , Rfl:   •  ibuprofen (ADVIL,MOTRIN) 800 MG tablet, Take 800 mg by mouth 3 (Three) Times a Day., Disp: , Rfl:   •  insulin glargine (LANTUS) 100 UNIT/ML injection, Inject 80 Units under the skin Every Night. PT TOOK 40 UNITS LAST LUCILA FOR CATH PREP AND NPO, Disp: , Rfl:   •  latanoprost (XALATAN) 0.005 % ophthalmic solution, Administer 1 drop to both eyes Every Night., Disp: , Rfl:   •  metFORMIN (GLUCOPHAGE) 500 MG tablet, Take 4 tablets by mouth Daily With Breakfast. PT TOOK ONLY 1000 MG YESTERDAY MORNING  Resume on 5/19, Disp: , Rfl:   •  metoprolol succinate XL (TOPROL-XL) 100 MG 24 hr tablet, Take 100 mg by mouth daily., Disp: , Rfl:   •  NIFEdipine XL (PROCARDIA XL) 60 MG 24 hr tablet, Take 60 mg by mouth Every Night., Disp: , Rfl:   •  pioglitazone  "(ACTOS) 45 MG tablet, Take 45 mg by mouth daily., Disp: , Rfl:   •  tamsulosin (FLOMAX) 0.4 MG capsule 24 hr capsule, Take 2 capsules by mouth every night., Disp: , Rfl:   •  valsartan (DIOVAN) 320 MG tablet, Take 320 mg by mouth daily., Disp: , Rfl:       Objective:     Vitals:    05/25/18 1222 05/25/18 1240   BP: 124/60 120/56   BP Location: Right arm Left arm   Pulse: 72    SpO2: 100%    Weight: 122 kg (270 lb)    Height: 172.7 cm (68\")      Body mass index is 41.05 kg/m².    PHYSICAL EXAM:    Physical Exam   Constitutional: He is oriented to person, place, and time. He appears well-developed and well-nourished. No distress.   HENT:   Head: Normocephalic and atraumatic.   Eyes: Pupils are equal, round, and reactive to light.   Neck: No JVD present. No thyromegaly present.   Cardiovascular: Normal rate, regular rhythm, normal heart sounds and intact distal pulses.    No murmur heard.  Right radial cath site well healed without erythema or echymosis, palpable proximal and distal pulses, good capillary refill   Pulmonary/Chest: Effort normal and breath sounds normal. No respiratory distress.   Abdominal: Soft. Bowel sounds are normal. He exhibits no distension. There is no splenomegaly or hepatomegaly. There is no tenderness.   Musculoskeletal: Normal range of motion. He exhibits no edema.   Neurological: He is alert and oriented to person, place, and time.   Skin: Skin is warm and dry. He is not diaphoretic. No erythema.   Psychiatric: He has a normal mood and affect. His behavior is normal. Judgment normal.         ECG 12 Lead  Date/Time: 5/25/2018 12:53 PM  Performed by: RICCO HANDY.  Authorized by: RICCO HANDY.   Comparison: compared with previous ECG from 5/14/2018  Similar to previous ECG  Rhythm: sinus rhythm  Ectopy: PVCs  BPM: 72  Conduction: LAFB  Clinical impression: abnormal ECG  Comments: Indication: Coronary disease, status post stent placement.              Assessment:       Diagnosis Plan "   1. Coronary artery disease involving native coronary artery of native heart without angina pectoris  ECG 12 Lead    Ambulatory Referral to Cardiac Rehab   2. History of coronary artery stent placement  ECG 12 Lead    Ambulatory Referral to Cardiac Rehab   3. Morbid obesity with BMI of 40.0-44.9, adult       Orders Placed This Encounter   Procedures   • Ambulatory Referral to Cardiac Rehab     Referral Priority:   Routine     Referral Type:   Rehabilitation - Outpatient     Number of Visits Requested:   1   • ECG 12 Lead     This order was created via procedure documentation          Plan:       1.  Coronary Artery Disease  Assessment  • The patient has no angina    Plan  • Lifestyle modifications discussed include adhering to a heart healthy diet, maintenance of a healthy weight, medication compliance and regular exercise    Subjective - Objective  • There has been a previous stent procedure using LACEY  • Current antiplatelet therapy includes aspirin 81 mg and clopidogrel 75 mg  • The patient qualifies for cardiac rehabilitation, and has been referred to cardiac rehab  • Overall he's doing well.  He really didn't have much in the way and symptoms before, just an abnormal ECG which led to an abnormal stress test which led to cardiac catheterization.  We did talk at length about diet and exercise, and even know he is having some issues with his left leg I am going to try to get him in to cardiac rehabilitation.  Hopefully he can participate.  We talked about the importance of dual antiplatelet therapy, and he will need to be on this for at least 3-6 months per Dr. Barney's recommendations before he has any surgery on his spine.    2.  Morbid obesity.  Weight loss is going to be kurtz for him.  We did talk about diet and exercise extensively today.  Hopefully he can participate in cardiac rehabilitation.    I'm not going to make any changes to his medical regimen, and he will follow-up with Dr. Anderson on  6/28/2018.    As always, it has been a pleasure to participate in your patient's care.      Sincerely,         Blanche Rausch PA-C

## 2018-05-29 ENCOUNTER — TELEPHONE (OUTPATIENT)
Dept: CARDIOLOGY | Facility: CLINIC | Age: 73
End: 2018-05-29

## 2018-05-29 NOTE — TELEPHONE ENCOUNTER
I spoke with him.  He has taken ibuprofen regularly for many years because of a pinched nerve in his back.  The only thing that would give some relief.  I did tell him that it is contraindicated in people with coronary disease, however for right now he can't function without it and he will stay on it until he follows up with Dr. Anderson.  He gets his medications from the [facility], they switched him from amlodipine to nifedipine.  This is fine with me.

## 2018-05-29 NOTE — TELEPHONE ENCOUNTER
I spoke with the patient and he would like to talk to you about his medications. [facility] taking Ibuprofen with his Nifedipine and Plavix. He would like to discuss this with you as [facility] at [facility] tomorrow. He can be reached at [phone]        Thanks  Carmelo

## 2018-06-01 ENCOUNTER — TELEPHONE (OUTPATIENT)
Dept: CARDIOLOGY | Facility: CLINIC | Age: 73
End: 2018-06-01

## 2018-06-01 NOTE — TELEPHONE ENCOUNTER
The patient would like to know if they can get an RX for Rapatha. I have talked to the insurance company a few times as they were thinking the patient was talking about a test not a medication. Hailee pollack [facility] said that the patient says he can not tolerate the pills and would like the shot if possible.        Thanks  Carmelo

## 2018-06-04 NOTE — TELEPHONE ENCOUNTER
It looks like [facility], who has been prescribing his cholesterol medications.  Please ask him if it would be better for us to prescribe this or the [facility].  If we are going to prescribe, does he need a paper prescription to take to the [facility], or will I be sending it to his pharmacy?

## 2018-06-04 NOTE — TELEPHONE ENCOUNTER
He wants us to talk to you about the possible side effects and if you think that he will benefit from it, [facility] said they would not prescribe it so he would like for our office to if possible and if you and him agree on it being suitable for him to take. He can be reached at [phone]        Thanks  Carmelo

## 2018-06-07 DIAGNOSIS — E78.5 HYPERLIPIDEMIA, UNSPECIFIED HYPERLIPIDEMIA TYPE: Primary | ICD-10-CM

## 2018-06-07 NOTE — TELEPHONE ENCOUNTER
I spoke with him.  I put in order in for Repatha.  We need to go ahead and check fasting labs, I put in order in for a lipid panel and a CMP to be performed sometime in the next week, he will come to the hospital to get this done.  He is been intolerant to multiple statins.

## 2018-06-12 ENCOUNTER — LAB (OUTPATIENT)
Dept: LAB | Facility: HOSPITAL | Age: 73
End: 2018-06-12

## 2018-06-12 DIAGNOSIS — E78.5 HYPERLIPIDEMIA, UNSPECIFIED HYPERLIPIDEMIA TYPE: ICD-10-CM

## 2018-06-12 LAB
ALBUMIN SERPL-MCNC: 3.9 G/DL (ref 3.5–5.2)
ALBUMIN/GLOB SERPL: 1.1 G/DL
ALP SERPL-CCNC: 74 U/L (ref 39–117)
ALT SERPL W P-5'-P-CCNC: 13 U/L (ref 1–41)
ANION GAP SERPL CALCULATED.3IONS-SCNC: 15.7 MMOL/L
AST SERPL-CCNC: 14 U/L (ref 1–40)
BILIRUB SERPL-MCNC: 0.3 MG/DL (ref 0.1–1.2)
BUN BLD-MCNC: 24 MG/DL (ref 8–23)
BUN/CREAT SERPL: 27.3 (ref 7–25)
CALCIUM SPEC-SCNC: 9.7 MG/DL (ref 8.6–10.5)
CHLORIDE SERPL-SCNC: 99 MMOL/L (ref 98–107)
CHOLEST SERPL-MCNC: 167 MG/DL (ref 0–200)
CO2 SERPL-SCNC: 22.3 MMOL/L (ref 22–29)
CREAT BLD-MCNC: 0.88 MG/DL (ref 0.76–1.27)
GFR SERPL CREATININE-BSD FRML MDRD: 85 ML/MIN/1.73
GLOBULIN UR ELPH-MCNC: 3.4 GM/DL
GLUCOSE BLD-MCNC: 87 MG/DL (ref 65–99)
HDLC SERPL-MCNC: 34 MG/DL (ref 40–60)
LDLC SERPL CALC-MCNC: 99 MG/DL (ref 0–100)
LDLC/HDLC SERPL: 2.91 {RATIO}
POTASSIUM BLD-SCNC: 4.3 MMOL/L (ref 3.5–5.2)
PROT SERPL-MCNC: 7.3 G/DL (ref 6–8.5)
SODIUM BLD-SCNC: 137 MMOL/L (ref 136–145)
TRIGL SERPL-MCNC: 170 MG/DL (ref 0–150)
VLDLC SERPL-MCNC: 34 MG/DL (ref 5–40)

## 2018-06-12 PROCEDURE — 80061 LIPID PANEL: CPT

## 2018-06-12 PROCEDURE — 80053 COMPREHEN METABOLIC PANEL: CPT

## 2018-06-12 PROCEDURE — 36415 COLL VENOUS BLD VENIPUNCTURE: CPT

## 2018-06-13 ENCOUNTER — APPOINTMENT (OUTPATIENT)
Dept: CARDIAC REHAB | Facility: HOSPITAL | Age: 73
End: 2018-06-13

## 2018-06-20 ENCOUNTER — OFFICE VISIT (OUTPATIENT)
Dept: CARDIAC REHAB | Facility: HOSPITAL | Age: 73
End: 2018-06-20

## 2018-06-20 VITALS
HEART RATE: 72 BPM | HEIGHT: 68 IN | DIASTOLIC BLOOD PRESSURE: 60 MMHG | OXYGEN SATURATION: 97 % | SYSTOLIC BLOOD PRESSURE: 140 MMHG | WEIGHT: 273.6 LBS | BODY MASS INDEX: 41.46 KG/M2

## 2018-06-20 DIAGNOSIS — Z95.5 S/P DRUG ELUTING CORONARY STENT PLACEMENT: Primary | ICD-10-CM

## 2018-06-20 PROCEDURE — 93797 PHYS/QHP OP CAR RHAB WO ECG: CPT

## 2018-06-20 NOTE — PROGRESS NOTES
Cardiac Rehab Initial Assessment      Name: Will Garcia  :1945 Allergies:Abner [cefaclor]   MRN: [id number] 72 y.o. Physician: Dereck Lemus MD/Haydee Rice MD at [facility]   Primary Diagnosis:    Diagnosis Plan   1. S/P drug eluting coronary stent placement      Event Date: 2018 Specialist: Monica   Secondary Diagnosis:  Risk Stratification:Moderate Risk Note Author: Luz Marina Nguyen RN     Cardiovascular History: Comments First heart event     EXERCISE AT HOME  no    N/A    EF: 61%      Source: echo 5/15/2018          Ambulatory Status:Cane  Ambulatory Fall Risk Assessed on Initial Visit: yes 6 Minute Walk Pre- Cardiac Rehab:  Distance:1202ft      RPE:3  Max. HR: 115       SPO2:90-98    MET: 2.8  MPH: 2.3             RPD: 3  Resting BP: 140/60 LA, 136/60 RA    Peak BP: 204/74  Recovery BP: 142/60  Comments: Pt walked full 6 minutes without stopping.  No balance or gait issues noted.  He took his cane with him, but did not use it. He says he takes his cane with him in case if he is having back pain/left leg weakness. Offered to do his 6 MWT on another piece of equipment but pt says he can walk.     NUTRITION  Lipids:yes If yes, labs as follows;  Total: No components found for: CHOLESTEROL  HDL:   HDL Cholesterol   Date Value Ref Range Status   2018 34 (L) 40 - 60 mg/dL Final    Lipids continued:  LDL:  LDL Cholesterol    Date Value Ref Range Status   2018 99 0 - 100 mg/dL Final     Triglyceride: No components found for: TRIGLYCERIDE   Weight Management:                 Weight: 273.6 lbs  Height: 68 inches                                   BMI: Body mass index is 41.6 kg/m².  Waist Circumference: 54  inches   Alcohol Use: rarely Diabetes:Yes,  Monitors BS at home- yes, Frequency: 5 days /week, Random BS: 127 today    Last HGBA1C with date if applicable:No components found for: A1C         SOCIAL HISTORY  Social History     Social History   • Marital status:      Social History  "Main Topics   • Smoking status: Never Smoker   • Smokeless tobacco: Never Used      Comment: Caffeine - 1-2 daily   • Alcohol use 0.6 oz/week     1 Glasses of wine per week      Comment: Once yearly   • Drug use: No   • Sexual activity: Defer     Other Topics Concern   • Not on file       Educational Level (choose one that applies) post college graduate work or degree Pt has PhD in psychology Learning Barriers:Ready to Learn, Vision  Wears glasses    Family Support:yes    Living Arrangement: lives with their spouse    Risk Factors: Stress  No, Clinical Depression  No, Heredity  Yes  If Yes father  of CHF, but had several MIs, Hyperlipidemia  Yes, Diabetes  Yes If Yes: Do you check blood glucose daily  Yes Today's glucose level 127, Exercise prior to event  No If Yes: Activity na, Minutes per day na, Days per week na and Obesity  Yes     Tobacco Adjunct: No        Comorbidities: Malignancy skin cancers that were removed, Pulmonary disease HELEN/CPAP and Diabetes Mellitus several years started at age 56, lymphedema.     PSYCHOSOCIAL  Clinical Depression: no    Stress: no     Assess presence or absence of depression using a valid screening tool: yes      PHYSICAL ASSESSMENT  Influenza vaccine: yes  Pneumococcal vaccine: yes          Angina: no    Describe angina scale of 0 - 4: 0 = none    Today are you having incisional pain? N/A. If, Yes, Scale: na        Today are you having any other pain? Yes. If, Yes, Scale: 8    Pt has chronic low back pain for which he gets epidural injections.  He takes Ibuprofen daily for back pain relief.  Walking worsens back pain Diagnosed with Hypertension:yes    Heart Sounds: S1 S2 regular     Lung Sounds: normal air entry, lungs clear to auscultation         Assessment: Pt was teary-eyed when IA began and we were setting goals for program.  We were talking about his chronic back pain.  Pt did not explain in depth his feelings, but said \"It makes me feel weak\" Orthopedic Problems:  See " above.  Shoulder injury with surgery on left shoulder. Injury around 2005.    Are you being hurt, hit, or frightened by anyone at home or in your life? no    Are you being neglected by a caregiver? N/A Shoulder flexibility/Range of motion: Below average Difficulty patting self on upper back    Recommended arm activity: Arm Ergometer    Chair sit and reach within: 13 inches   Leg flexibility: Below average    Leg Strength/Balance/Five times sit to stand: 0 seconds.   Unable to perform  Chose one: Fall Risk    Recommended stretching: Chair but may be able to do some standing stretches.    Assessment: Very nice gentleman.  Skin warm, dry, pink.  He wears compression stockings on both legs for lymphedema.  He is appropriate to discussion.    Family attends IA: no Time of arrival: 1345  Time of departure: 1530     Patient Goals: MET 3-4.  Lose 5-10 lbs in program with long term goal weight of 198.  Overall strengthening in preparation for back surgery to be done in about 1 year.  Develop a long term exercise program to use after he completes phase II.         6/20/2018  1:37 PM  Luz Marina Nguyen RN

## 2018-06-22 ENCOUNTER — TREATMENT (OUTPATIENT)
Dept: CARDIAC REHAB | Facility: HOSPITAL | Age: 73
End: 2018-06-22

## 2018-06-22 DIAGNOSIS — Z95.5 S/P DRUG ELUTING CORONARY STENT PLACEMENT: Primary | ICD-10-CM

## 2018-06-22 PROCEDURE — 93798 PHYS/QHP OP CAR RHAB W/ECG: CPT

## 2018-06-25 ENCOUNTER — TREATMENT (OUTPATIENT)
Dept: CARDIAC REHAB | Facility: HOSPITAL | Age: 73
End: 2018-06-25

## 2018-06-25 DIAGNOSIS — Z95.5 S/P DRUG ELUTING CORONARY STENT PLACEMENT: Primary | ICD-10-CM

## 2018-06-25 PROCEDURE — 93798 PHYS/QHP OP CAR RHAB W/ECG: CPT

## 2018-06-26 ENCOUNTER — APPOINTMENT (OUTPATIENT)
Dept: CARDIAC REHAB | Facility: HOSPITAL | Age: 73
End: 2018-06-26

## 2018-06-27 ENCOUNTER — TREATMENT (OUTPATIENT)
Dept: CARDIAC REHAB | Facility: HOSPITAL | Age: 73
End: 2018-06-27

## 2018-06-27 DIAGNOSIS — Z95.5 S/P DRUG ELUTING CORONARY STENT PLACEMENT: Primary | ICD-10-CM

## 2018-06-27 PROCEDURE — 93798 PHYS/QHP OP CAR RHAB W/ECG: CPT

## 2018-06-28 ENCOUNTER — OFFICE VISIT (OUTPATIENT)
Dept: CARDIOLOGY | Facility: CLINIC | Age: 73
End: 2018-06-28

## 2018-06-28 VITALS
OXYGEN SATURATION: 96 % | HEIGHT: 68 IN | SYSTOLIC BLOOD PRESSURE: 120 MMHG | DIASTOLIC BLOOD PRESSURE: 78 MMHG | BODY MASS INDEX: 41.37 KG/M2 | WEIGHT: 273 LBS | HEART RATE: 81 BPM

## 2018-06-28 DIAGNOSIS — I25.10 CORONARY ARTERY DISEASE INVOLVING NATIVE CORONARY ARTERY OF NATIVE HEART WITHOUT ANGINA PECTORIS: ICD-10-CM

## 2018-06-28 DIAGNOSIS — I10 ESSENTIAL HYPERTENSION: Primary | ICD-10-CM

## 2018-06-28 PROCEDURE — 99214 OFFICE O/P EST MOD 30 MIN: CPT | Performed by: INTERNAL MEDICINE

## 2018-06-28 NOTE — PROGRESS NOTES
Subjective:     Encounter Date:06/28/2018      Patient ID: Will Garcia is a 72 y.o. male.    Chief Complaint:  Coronary Artery Disease   Presents for follow-up visit. Pertinent negatives include no chest pain, chest pressure or chest tightness. The symptoms have been stable. Compliance with diet is good. Compliance with exercise is good. Compliance with medications is good.       72-year-old gentleman who presents today for reevaluation.  Patient was found to have a significant LAD disease.  She underwent a stent placement with a drug-eluting stent.  He never felt bad before doesn't feel any different afterward.  He does describe some fatigue.  He still faces surgery with .  He did stop taking all his ibuprofen and he was taking.  He did have one complaint and was concerning and that was he had a bloody stool this morning when he wiped he said it was maroon appearing.    Review of Systems   Cardiovascular: Negative for chest pain.   Respiratory: Negative for chest tightness.    Gastrointestinal: Positive for melena.   All other systems reviewed and are negative.      Procedures       Objective:     Physical Exam   Constitutional: He is oriented to person, place, and time. He appears well-developed.   HENT:   Head: Normocephalic.   Eyes: Conjunctivae are normal.   Neck: Normal range of motion.   Cardiovascular: Normal rate, regular rhythm and normal heart sounds.    Pulmonary/Chest: Breath sounds normal.   Abdominal: Soft. Bowel sounds are normal.   Musculoskeletal: Normal range of motion. He exhibits no edema.   Neurological: He is alert and oriented to person, place, and time.   Skin: Skin is warm and dry.   Psychiatric: He has a normal mood and affect. His behavior is normal.   Vitals reviewed.      Lab Review:       Assessment:          Diagnosis Plan   1. Essential hypertension     2. Coronary artery disease involving native coronary artery of native heart without angina pectoris            Plan:        1.  Coronary artery disease status post angioplasty.  As I told the patient we will prefer one year however in 6 months if he continues to have problems we could proceed with his surgery.  Obviously if it becomes emergent and we'll have to reconsider.  2.  Hypertension blood pressures great  3.  Patient did have some bloody-type stools he said he's had this in the past although he thousand but more pronounced.  I told him to see his primary care physician he said he usually get appointment the next day planned on doing that tomorrow.  I totally started getting dizzy lightheaded or having problems or if the bleeding did not resolve to go emergency room immediately.  4.  Will follow-up in 6 months reassess for possible surgery clearence    Coronary Artery Disease  Assessment  • The patient has no angina    Plan  • Lifestyle modifications discussed include adhering to a heart healthy diet, maintenance of a healthy weight, medication compliance and regular exercise    Subjective - Objective  • There has been a previous stent procedure using LACEY  • Current antiplatelet therapy includes aspirin 81 mg and clopidogrel 75 mg  • The patient qualifies for cardiac rehabilitation, and has been referred to cardiac rehab

## 2018-07-02 ENCOUNTER — TREATMENT (OUTPATIENT)
Dept: CARDIAC REHAB | Facility: HOSPITAL | Age: 73
End: 2018-07-02

## 2018-07-02 DIAGNOSIS — Z95.5 S/P DRUG ELUTING CORONARY STENT PLACEMENT: Primary | ICD-10-CM

## 2018-07-02 PROCEDURE — 93798 PHYS/QHP OP CAR RHAB W/ECG: CPT

## 2018-07-06 ENCOUNTER — TREATMENT (OUTPATIENT)
Dept: CARDIAC REHAB | Facility: HOSPITAL | Age: 73
End: 2018-07-06

## 2018-07-06 DIAGNOSIS — Z95.5 S/P DRUG ELUTING CORONARY STENT PLACEMENT: Primary | ICD-10-CM

## 2018-07-06 PROCEDURE — 93798 PHYS/QHP OP CAR RHAB W/ECG: CPT

## 2018-07-10 ENCOUNTER — TRANSCRIBE ORDERS (OUTPATIENT)
Dept: ADMINISTRATIVE | Facility: HOSPITAL | Age: 73
End: 2018-07-10

## 2018-07-10 ENCOUNTER — TELEPHONE (OUTPATIENT)
Dept: CARDIOLOGY | Facility: CLINIC | Age: 73
End: 2018-07-10

## 2018-07-10 DIAGNOSIS — M43.06 SPONDYLOLYSIS, LUMBAR REGION: Primary | ICD-10-CM

## 2018-07-10 NOTE — TELEPHONE ENCOUNTER
Dr. Anderson is on vacation. Thanks for taking the msg.    Pt called and needs to have a  Lumbar epidural but just had a stent placed on the 17th of last month.  [facility] is asking him to hold his ASA and Plavix for a week.  He is asking if this is OK?  Please advise.    Thanks,  Tea

## 2018-07-11 ENCOUNTER — TREATMENT (OUTPATIENT)
Dept: CARDIAC REHAB | Facility: HOSPITAL | Age: 73
End: 2018-07-11

## 2018-07-11 DIAGNOSIS — Z95.5 S/P DRUG ELUTING CORONARY STENT PLACEMENT: Primary | ICD-10-CM

## 2018-07-11 PROCEDURE — 93798 PHYS/QHP OP CAR RHAB W/ECG: CPT

## 2018-07-11 NOTE — TELEPHONE ENCOUNTER
I spoke with the pt and he understood, just isn't sure what he's supposed to do about his back pain.  I told him to talk with his PMD to see if they could offer some help.    Tea

## 2018-07-11 NOTE — TELEPHONE ENCOUNTER
No, per Dr. Anderson's note he needs to be on ASA and Plavix for 6 months unless it is an emergency.  He cannot hold these meds.  Please let him know

## 2018-07-13 ENCOUNTER — TREATMENT (OUTPATIENT)
Dept: CARDIAC REHAB | Facility: HOSPITAL | Age: 73
End: 2018-07-13

## 2018-07-13 DIAGNOSIS — Z95.5 S/P DRUG ELUTING CORONARY STENT PLACEMENT: Primary | ICD-10-CM

## 2018-07-13 PROCEDURE — 93798 PHYS/QHP OP CAR RHAB W/ECG: CPT

## 2018-07-16 ENCOUNTER — TREATMENT (OUTPATIENT)
Dept: CARDIAC REHAB | Facility: HOSPITAL | Age: 73
End: 2018-07-16

## 2018-07-16 DIAGNOSIS — Z95.5 S/P DRUG ELUTING CORONARY STENT PLACEMENT: Primary | ICD-10-CM

## 2018-07-16 PROCEDURE — 93798 PHYS/QHP OP CAR RHAB W/ECG: CPT

## 2018-07-19 ENCOUNTER — HOSPITAL ENCOUNTER (OUTPATIENT)
Dept: PAIN MEDICINE | Facility: HOSPITAL | Age: 73
Discharge: HOME | End: 2018-07-19

## 2018-07-19 ENCOUNTER — HOSPITAL ENCOUNTER (OUTPATIENT)
Dept: PAIN MEDICINE | Facility: HOSPITAL | Age: 73
Discharge: HOME | End: 2018-07-19
Attending: INTERNAL MEDICINE

## 2018-07-20 ENCOUNTER — TREATMENT (OUTPATIENT)
Dept: CARDIAC REHAB | Facility: HOSPITAL | Age: 73
End: 2018-07-20

## 2018-07-20 DIAGNOSIS — Z95.5 S/P DRUG ELUTING CORONARY STENT PLACEMENT: Primary | ICD-10-CM

## 2018-07-20 PROCEDURE — 93798 PHYS/QHP OP CAR RHAB W/ECG: CPT

## 2018-07-23 ENCOUNTER — TREATMENT (OUTPATIENT)
Dept: CARDIAC REHAB | Facility: HOSPITAL | Age: 73
End: 2018-07-23

## 2018-07-23 DIAGNOSIS — Z95.5 S/P DRUG ELUTING CORONARY STENT PLACEMENT: Primary | ICD-10-CM

## 2018-07-23 PROCEDURE — 93798 PHYS/QHP OP CAR RHAB W/ECG: CPT

## 2018-07-25 ENCOUNTER — ANESTHESIA (OUTPATIENT)
Dept: PAIN MEDICINE | Facility: HOSPITAL | Age: 73
End: 2018-07-25

## 2018-07-25 ENCOUNTER — HOSPITAL ENCOUNTER (OUTPATIENT)
Dept: PAIN MEDICINE | Facility: HOSPITAL | Age: 73
Discharge: HOME | End: 2018-07-25
Attending: INTERNAL MEDICINE | Admitting: ANESTHESIOLOGY

## 2018-07-25 ENCOUNTER — ANESTHESIA EVENT (OUTPATIENT)
Dept: PAIN MEDICINE | Facility: HOSPITAL | Age: 73
End: 2018-07-25

## 2018-07-25 ENCOUNTER — HOSPITAL ENCOUNTER (OUTPATIENT)
Dept: GENERAL RADIOLOGY | Facility: HOSPITAL | Age: 73
Discharge: HOME | End: 2018-07-25

## 2018-07-25 VITALS
HEART RATE: 76 BPM | OXYGEN SATURATION: 93 % | SYSTOLIC BLOOD PRESSURE: 123 MMHG | DIASTOLIC BLOOD PRESSURE: 72 MMHG | TEMPERATURE: 97.6 F | RESPIRATION RATE: 16 BRPM

## 2018-07-25 DIAGNOSIS — R52 PAIN: ICD-10-CM

## 2018-07-25 PROCEDURE — 77003 FLUOROGUIDE FOR SPINE INJECT: CPT

## 2018-07-25 PROCEDURE — 25010000002 METHYLPREDNISOLONE PER 80 MG: Performed by: ANESTHESIOLOGY

## 2018-07-25 PROCEDURE — C1755 CATHETER, INTRASPINAL: HCPCS

## 2018-07-25 PROCEDURE — 25010000002 MIDAZOLAM PER 1 MG: Performed by: ANESTHESIOLOGY

## 2018-07-25 PROCEDURE — 0 IOPAMIDOL 41 % SOLUTION: Performed by: ANESTHESIOLOGY

## 2018-07-25 RX ORDER — SODIUM CHLORIDE 0.9 % (FLUSH) 0.9 %
1-10 SYRINGE (ML) INJECTION AS NEEDED
Status: DISCONTINUED | OUTPATIENT
Start: 2018-07-25 | End: 2018-07-26 | Stop reason: HOSPADM

## 2018-07-25 RX ORDER — FENTANYL CITRATE 50 UG/ML
50 INJECTION, SOLUTION INTRAMUSCULAR; INTRAVENOUS AS NEEDED
Status: DISCONTINUED | OUTPATIENT
Start: 2018-07-25 | End: 2018-07-26 | Stop reason: HOSPADM

## 2018-07-25 RX ORDER — LIDOCAINE HYDROCHLORIDE 10 MG/ML
1 INJECTION, SOLUTION INFILTRATION; PERINEURAL ONCE AS NEEDED
Status: DISCONTINUED | OUTPATIENT
Start: 2018-07-25 | End: 2018-07-26 | Stop reason: HOSPADM

## 2018-07-25 RX ORDER — METHYLPREDNISOLONE ACETATE 80 MG/ML
80 INJECTION, SUSPENSION INTRA-ARTICULAR; INTRALESIONAL; INTRAMUSCULAR; SOFT TISSUE ONCE
Status: COMPLETED | OUTPATIENT
Start: 2018-07-25 | End: 2018-07-25

## 2018-07-25 RX ORDER — MIDAZOLAM HYDROCHLORIDE 1 MG/ML
1 INJECTION INTRAMUSCULAR; INTRAVENOUS AS NEEDED
Status: DISCONTINUED | OUTPATIENT
Start: 2018-07-25 | End: 2018-07-26 | Stop reason: HOSPADM

## 2018-07-25 RX ADMIN — IOPAMIDOL 10 ML: 408 INJECTION, SOLUTION INTRATHECAL at 14:17

## 2018-07-25 RX ADMIN — MIDAZOLAM 2 MG: 1 INJECTION INTRAMUSCULAR; INTRAVENOUS at 14:14

## 2018-07-25 RX ADMIN — METHYLPREDNISOLONE ACETATE 80 MG: 80 INJECTION, SUSPENSION INTRA-ARTICULAR; INTRALESIONAL; INTRAMUSCULAR; SOFT TISSUE at 14:17

## 2018-07-25 NOTE — H&P
CHIEF COMPLAINT: Back and left lower extremity pain      HISTORY OF PRESENT ILLNESS:  He is scheduled for lumbar spine surgery but during his preop clearance E was noted to have coronary blockages and underwent stent placement.  He will now follow up after 6 months with his surgeon to decide if they can to go forward with surgery again.  In the meantime he is here for evaluation for steroid injections.  His previously been in our clinic multiple times he gets well over 50% relief for several months.  He describes low lumbar back pain which is associated with left lower extremity weakness and numbness.  Constant in timing.  Between a 3 and 9 out of 10 on the pain scale.  Deep crushing pressure throbbing in nature.  I rib activity.  He has done an oral steroid pack over-the-counter medicines such as Tylenol he is in cardiac rehabilitation is also been trying rest he's done at least for over 5 weeks.  He has not done formal physical therapy at this time.  It's affecting his sleep and his exercise.  His MRI shows degeneration and spinal stenosis    PAST MEDICAL HISTORY:  Current Outpatient Prescriptions on File Prior to Encounter   Medication Sig Dispense Refill   • allopurinol (ZYLOPRIM) 100 MG tablet Take 100 mg by mouth Every Night.     • Dulaglutide (TRULICITY SC) Inject 1 mL under the skin 1 (One) Time Per Week. TAKES EVERY TUESDAY     • fluticasone (FLONASE) 50 MCG/ACT nasal spray 2 sprays into each nostril As Needed. Administer 2 sprays in each nostril for each dose.      • furosemide (LASIX) 80 MG tablet Take 1 tablet by mouth Daily. Resume on 5/18     • insulin glargine (LANTUS) 100 UNIT/ML injection Inject 80 Units under the skin Every Night. PT TOOK 40 UNITS LAST LUCILA FOR CATH PREP AND NPO     • latanoprost (XALATAN) 0.005 % ophthalmic solution Administer 1 drop to both eyes Every Night.     • metFORMIN (GLUCOPHAGE) 500 MG tablet Take 4 tablets by mouth Daily With Breakfast. PT TOOK ONLY 1000 MG YESTERDAY  MORNING    Resume on 5/19     • metoprolol succinate XL (TOPROL-XL) 100 MG 24 hr tablet Take 100 mg by mouth daily.     • NIFEdipine XL (PROCARDIA XL) 60 MG 24 hr tablet Take 60 mg by mouth Every Night.     • pioglitazone (ACTOS) 45 MG tablet Take 45 mg by mouth daily.     • tamsulosin (FLOMAX) 0.4 MG capsule 24 hr capsule Take 2 capsules by mouth every night.     • valsartan (DIOVAN) 320 MG tablet Take 320 mg by mouth daily.     • aspirin 81 MG EC tablet Take 81 mg by mouth Every Night.     • clopidogrel (PLAVIX) 75 MG tablet Take 1 tablet by mouth Daily. 30 tablet 11     No current facility-administered medications on file prior to encounter.        Past Medical History:   Diagnosis Date   • Arthritis    • Chronic pain disorder    • Edema    • Essential hypertension    • Hard to intubate    • Kidney stones    • Low back pain    • Lumbosacral disc disease    • Morbid obesity with BMI of 40.0-44.9, adult (CMS/[facility])    • HELEN (obstructive sleep apnea)     CPAP   • Spinal stenosis    • Type 2 diabetes mellitus with diabetic neuropathy, with long-term current use of insulin (CMS/[facility])          SOCIAL HISTORY:  No tobacco    REVIEW OF SYSTEMS:  No hematologic infectious or constitutional symptoms  Positive for numbness and weakness on occasion the left extremity goes out  Other review of systems non-contributory    PHYSICAL EXAM:  /76 (BP Location: Left arm, Patient Position: Lying)   Pulse 82   Temp 36.4 °C (97.6 °F) (Oral)   Resp 16   SpO2 97%   Well-developed well-nourished no acute distress  Extra ocular movements intact  Mallampati class 2 airway  Cardiac:  Regular rate and rhythm  Lungs:  Clear to auscultation bilaterally with good effort  Alert and oriented ×3  Deep tendon reflexes Sent in the bilateral patella  Positive straight leg raise on the left negative on the right  5 out of 5 strength bilateral upper and lower extremities  Lumbar spine without obvious deformities ecchymoses  Lumbar spine nontender  to palpation      DIAGNOSIS:  Post-Op Diagnosis Codes:     * Lumbar degenerative disc disease [M51.36]     * Lumbar spinal stenosis [M48.061]     * Lumbar neuritis [M54.16]    PLAN:  1.  L4 Lumbar epidural steroid injections, up to 3, spaced 1-2 weeks apart.  If pain control is acceptable after 1 or 2 injections, it was discussed with the patient that they may return for the subsequent injections if and when their pain returns.  The risks were discussed with the patient including failure of relief, worsening pain, Headache (post dural puncture headache), bleeding (epidural hematoma) and infection (epidural abscess or skin infection).  2.  Physical therapy exercises at home as prescribed by physical therapy or from the pain clinic handout (given to the patient).  Continuation of these exercises every day, or multiple times per week, even when the patient has good pain relief, was stressed to the patient as a preventative measure to decrease the frequency and severity of future pain episodes.  3.  Continue pain medicines as already prescribed.  If patient not currently taking any, it is recommended to begin Acetaminophen 1000 mg po q 8 hours.  If other medicines containing Acetaminophen are currently prescribed, maintain daily dose at 3000 mg.    4.  If they can tolerate NSAIDS, it is recommended to take Ibuprofen 600 mg po q 6 hours for 7 days during pain exacerbations.  Alternatively, they may substitute an NSAID of their choice (e.g. Aleve).  This may be taken at the same time as Acetaminophen.  5.  Heat and ice to the affected area as tolerated for pain control.  It was discussed that heating pads can cause burns.  6.  Daily low impact exercise such as walking or water exercise was recommended to maintain overall health and aid in weight control.   7.  Follow up as needed for subsequent injections.  8.  Patient was counseled to abstain from tobacco products.

## 2018-07-25 NOTE — ANESTHESIA PROCEDURE NOTES
PAIN Epidural block    Patient location during procedure: pain clinic  Start Time: 7/25/2018 2:12 PM  Stop Time: 7/25/2018 2:23 PM  Indication:procedure for pain  Performed By  Anesthesiologist: SAMMIE BECKWITH  Preanesthetic Checklist  Completed: patient identified and risks and benefits discussed  Additional Notes  Diagnosis:     Post-Op Diagnosis Codes:     * Lumbar degenerative disc disease (M51.36)     * Lumbar spinal stenosis (M48.061)     * Lumbar neuritis (M54.16)    Sedation:  Versed 2 mg    A lumbar epidural steroid injection with fluoroscopic guidance and an Isovue dye epidurogram was performed.  Under fluoroscopic guidance, the epidural space was identified and accessed, confirmed by loss of resistance to saline followed by injection of Isovue dye, which shows a good epidurogram.  The above medications were injected uneventfully.    Prep:  Pt Position:prone  Sterile Tech:cap, gloves, mask and sterile barrier  Prep:chlorhexidine gluconate and isopropyl alcohol  Monitoring:blood pressure monitoring, continuous pulse oximetry and EKG  Procedure:  Sedation: yes   Approach:left paramedian  Guidance: fluoroscopy  Location:lumbar  Level:4-5  Needle Type:Tuohy  Needle Gauge:20  Aspiration:negative  Medications:  Depomedrol:80  Preservative Free Saline:1mL  Isovue:1mL    Post Assessment:  Pt Tolerance:patient tolerated the procedure well with no apparent complications  Complications:no

## 2018-07-27 ENCOUNTER — TREATMENT (OUTPATIENT)
Dept: CARDIAC REHAB | Facility: HOSPITAL | Age: 73
End: 2018-07-27

## 2018-07-27 DIAGNOSIS — Z95.5 S/P DRUG ELUTING CORONARY STENT PLACEMENT: Primary | ICD-10-CM

## 2018-07-27 PROCEDURE — 93798 PHYS/QHP OP CAR RHAB W/ECG: CPT

## 2018-07-30 ENCOUNTER — TREATMENT (OUTPATIENT)
Dept: CARDIAC REHAB | Facility: HOSPITAL | Age: 73
End: 2018-07-30

## 2018-07-30 DIAGNOSIS — Z95.5 S/P DRUG ELUTING CORONARY STENT PLACEMENT: Primary | ICD-10-CM

## 2018-07-30 PROCEDURE — 93798 PHYS/QHP OP CAR RHAB W/ECG: CPT

## 2018-08-01 ENCOUNTER — TREATMENT (OUTPATIENT)
Dept: CARDIAC REHAB | Facility: HOSPITAL | Age: 73
End: 2018-08-01

## 2018-08-01 DIAGNOSIS — Z95.5 S/P DRUG ELUTING CORONARY STENT PLACEMENT: Primary | ICD-10-CM

## 2018-08-01 PROCEDURE — 93798 PHYS/QHP OP CAR RHAB W/ECG: CPT

## 2018-08-10 ENCOUNTER — TREATMENT (OUTPATIENT)
Dept: CARDIAC REHAB | Facility: HOSPITAL | Age: 73
End: 2018-08-10

## 2018-08-10 DIAGNOSIS — Z95.5 S/P DRUG ELUTING CORONARY STENT PLACEMENT: Primary | ICD-10-CM

## 2018-08-10 PROCEDURE — 93798 PHYS/QHP OP CAR RHAB W/ECG: CPT

## 2018-08-15 ENCOUNTER — TREATMENT (OUTPATIENT)
Dept: CARDIAC REHAB | Facility: HOSPITAL | Age: 73
End: 2018-08-15

## 2018-08-15 DIAGNOSIS — Z95.5 S/P DRUG ELUTING CORONARY STENT PLACEMENT: Primary | ICD-10-CM

## 2018-08-15 PROCEDURE — 93798 PHYS/QHP OP CAR RHAB W/ECG: CPT

## 2018-08-15 NOTE — PROGRESS NOTES
CARDIAC/PULMONARY REHAB NUTRITION EDUCATION/ASSESSMENT    EDUCATION:   Attended diet and nutrition class.  Reviewed the AHA diet guidelines, The,Mediterranean Diet and Plant based diet. Discussed the DASH diet and meal strategies. DIet guidelines to include high fiber, antioxidant rich foods and healthy fats were presented. Supportive written information was provided.           5:42 PM  8/15/2018  Gaye Pineda RD

## 2018-08-20 ENCOUNTER — APPOINTMENT (OUTPATIENT)
Dept: CARDIAC REHAB | Facility: HOSPITAL | Age: 73
End: 2018-08-20

## 2018-08-22 ENCOUNTER — APPOINTMENT (OUTPATIENT)
Dept: CARDIAC REHAB | Facility: HOSPITAL | Age: 73
End: 2018-08-22

## 2018-08-24 ENCOUNTER — APPOINTMENT (OUTPATIENT)
Dept: CARDIAC REHAB | Facility: HOSPITAL | Age: 73
End: 2018-08-24

## 2018-08-27 ENCOUNTER — APPOINTMENT (OUTPATIENT)
Dept: CARDIAC REHAB | Facility: HOSPITAL | Age: 73
End: 2018-08-27

## 2018-08-29 ENCOUNTER — HOSPITAL ENCOUNTER (OUTPATIENT)
Dept: GENERAL RADIOLOGY | Facility: HOSPITAL | Age: 73
Discharge: HOME | End: 2018-08-29
Attending: INTERNAL MEDICINE | Admitting: INTERNAL MEDICINE

## 2018-08-29 ENCOUNTER — APPOINTMENT (OUTPATIENT)
Dept: CARDIAC REHAB | Facility: HOSPITAL | Age: 73
End: 2018-08-29

## 2018-08-29 DIAGNOSIS — R06.02 SOB (SHORTNESS OF BREATH): ICD-10-CM

## 2018-08-29 PROCEDURE — 71046 X-RAY EXAM CHEST 2 VIEWS: CPT

## 2018-08-31 ENCOUNTER — APPOINTMENT (OUTPATIENT)
Dept: CARDIAC REHAB | Facility: HOSPITAL | Age: 73
End: 2018-08-31

## 2018-09-05 ENCOUNTER — APPOINTMENT (OUTPATIENT)
Dept: CARDIAC REHAB | Facility: HOSPITAL | Age: 73
End: 2018-09-05

## 2018-09-07 ENCOUNTER — APPOINTMENT (OUTPATIENT)
Dept: CARDIAC REHAB | Facility: HOSPITAL | Age: 73
End: 2018-09-07

## 2018-09-10 ENCOUNTER — APPOINTMENT (OUTPATIENT)
Dept: CARDIAC REHAB | Facility: HOSPITAL | Age: 73
End: 2018-09-10

## 2018-09-12 ENCOUNTER — APPOINTMENT (OUTPATIENT)
Dept: CARDIAC REHAB | Facility: HOSPITAL | Age: 73
End: 2018-09-12

## 2018-09-14 ENCOUNTER — APPOINTMENT (OUTPATIENT)
Dept: CARDIAC REHAB | Facility: HOSPITAL | Age: 73
End: 2018-09-14

## 2018-09-17 ENCOUNTER — APPOINTMENT (OUTPATIENT)
Dept: CARDIAC REHAB | Facility: HOSPITAL | Age: 73
End: 2018-09-17

## 2018-09-19 ENCOUNTER — APPOINTMENT (OUTPATIENT)
Dept: CARDIAC REHAB | Facility: HOSPITAL | Age: 73
End: 2018-09-19

## 2018-09-21 ENCOUNTER — APPOINTMENT (OUTPATIENT)
Dept: CARDIAC REHAB | Facility: HOSPITAL | Age: 73
End: 2018-09-21

## 2018-12-20 ENCOUNTER — TELEPHONE (OUTPATIENT)
Dept: NEUROSURGERY | Facility: CLINIC | Age: 73
End: 2018-12-20

## 2018-12-20 ENCOUNTER — OFFICE VISIT (OUTPATIENT)
Dept: CARDIOLOGY | Facility: CLINIC | Age: 73
End: 2018-12-20

## 2018-12-20 VITALS
DIASTOLIC BLOOD PRESSURE: 64 MMHG | HEIGHT: 68 IN | WEIGHT: 279 LBS | SYSTOLIC BLOOD PRESSURE: 120 MMHG | OXYGEN SATURATION: 95 % | HEART RATE: 79 BPM | BODY MASS INDEX: 42.28 KG/M2

## 2018-12-20 DIAGNOSIS — I25.10 CORONARY ARTERY DISEASE INVOLVING NATIVE CORONARY ARTERY OF NATIVE HEART WITHOUT ANGINA PECTORIS: Primary | ICD-10-CM

## 2018-12-20 PROCEDURE — 99214 OFFICE O/P EST MOD 30 MIN: CPT | Performed by: INTERNAL MEDICINE

## 2018-12-20 NOTE — PROGRESS NOTES
Subjective:     Encounter Date:12/20/2018      Patient ID: Will Garcia is a 73 y.o. male.    Chief Complaint:  History of Present Illness    {Common H&P Review Areas:78913}    ROS    Procedures       Objective:     Physical Exam    Lab Review:       Assessment:         No diagnosis found.       Plan:

## 2018-12-20 NOTE — PROGRESS NOTES
Subjective:     Encounter Date:06/28/2018      Patient ID: Will Garcia is a 73 y.o. male.    Chief Complaint:  Coronary Artery Disease   Presents for follow-up visit. Pertinent negatives include no chest pain, chest pressure or chest tightness. The symptoms have been stable. Compliance with diet is good. Compliance with exercise is good. Compliance with medications is good.       72-year-old gentleman who presents today for reevaluation.  Patient was found to have a significant LAD disease.  She underwent a stent placement with a drug-eluting stent 6 months ago.  He now presents for perioperative risk assessment.  Clinically he's doing well but then again he never really had symptoms prior to his abnormal stress test.  His legs however still continues to be an issue and continues to gradually worsen.      Review of Systems   Cardiovascular: Negative for chest pain.   Respiratory: Negative for chest tightness.    Gastrointestinal: Positive for melena.   All other systems reviewed and are negative.      Procedures         Objective:     Physical Exam   Constitutional: He is oriented to person, place, and time. He appears well-developed.   HENT:   Head: Normocephalic.   Eyes: Conjunctivae are normal.   Neck: Normal range of motion.   Cardiovascular: Normal rate, regular rhythm and normal heart sounds.   Pulmonary/Chest: Breath sounds normal.   Abdominal: Soft. Bowel sounds are normal.   Musculoskeletal: Normal range of motion. He exhibits no edema.   Neurological: He is alert and oriented to person, place, and time.   Skin: Skin is warm and dry.   Psychiatric: He has a normal mood and affect. His behavior is normal.   Vitals reviewed.      Lab Review:       Assessment:         No diagnosis found.       Plan:       1.  Coronary artery disease status post angioplasty.  Is now been 6 months since his angioplasty and he needs his lumbar surgery.  He is having quite a bit of leg weakness/loss of function.  At this  point I would proceed with surgery as clinically indicated.  I told to stop his aspirin and Plavix about one week prior to surgery.  I also explained to him that there is always a small but finite risk that he can clot off and stent.  However everything is a risk benefit and at this point with his leg/foot continued to be an issue and worsening I would proceed as clinically indicated.  2.  Hypertension blood pressures great  3. Will follow-up in 6 months reassess for possible surgery clearence    Coronary Artery Disease  Assessment  • The patient has no angina    Plan  • Lifestyle modifications discussed include adhering to a heart healthy diet, maintenance of a healthy weight, medication compliance and regular exercise    Subjective - Objective  • There has been a previous stent procedure using LACEY  • Current antiplatelet therapy includes aspirin 81 mg and clopidogrel 75 mg  • The patient qualifies for cardiac rehabilitation, and has been referred to cardiac rehab

## 2018-12-20 NOTE — TELEPHONE ENCOUNTER
Pt was last seen on 5/3 for chronic LBP with [facility].    Pt states that he was to have surgery with JEH but something was found on his testing that he needed to have fixed. Pt is cleared to have surgery through his cardiologist and he would like to get an appt to see us to start the process again. He states that he would like to wait until after the holidays to be seen.    I advised pt that he would receive a call tomorrow regarding this matter and he voiced understanding.    [phone] - home  [phone] - rcsr

## 2018-12-20 NOTE — TELEPHONE ENCOUNTER
Schedule appt w/APRN (NOT emergent, can be late Jan). We will have to see all the medical clearances at that time. APRN will determine if additional imaging needed at time of visit

## 2019-01-18 ENCOUNTER — OFFICE VISIT (OUTPATIENT)
Dept: NEUROSURGERY | Facility: CLINIC | Age: 74
End: 2019-01-18

## 2019-01-18 VITALS
RESPIRATION RATE: 16 BRPM | DIASTOLIC BLOOD PRESSURE: 60 MMHG | BODY MASS INDEX: 42.27 KG/M2 | HEART RATE: 68 BPM | SYSTOLIC BLOOD PRESSURE: 148 MMHG | WEIGHT: 278 LBS

## 2019-01-18 DIAGNOSIS — M48.062 SPINAL STENOSIS, LUMBAR REGION, WITH NEUROGENIC CLAUDICATION: Primary | ICD-10-CM

## 2019-01-18 DIAGNOSIS — R29.898 LEFT LEG WEAKNESS: ICD-10-CM

## 2019-01-18 PROCEDURE — 99214 OFFICE O/P EST MOD 30 MIN: CPT | Performed by: NEUROLOGICAL SURGERY

## 2019-01-18 RX ORDER — GABAPENTIN 300 MG/1
300 CAPSULE ORAL 3 TIMES DAILY
Qty: 90 CAPSULE | Refills: 0 | Status: SHIPPED | OUTPATIENT
Start: 2019-01-18

## 2019-01-18 RX ORDER — DIAZEPAM 5 MG/1
5 TABLET ORAL 2 TIMES DAILY PRN
Qty: 2 TABLET | Refills: 0 | Status: SHIPPED | OUTPATIENT
Start: 2019-01-18 | End: 2019-02-19

## 2019-01-18 NOTE — PROGRESS NOTES
Subjective   Patient ID: Will Garcia is a 73 y.o. male is here today for follow-up of back pain with left leg weakness. He underwent cardiac clearance and wishes to discuss surgery options once more. He is accompanied by his wife..    History of Present Illness     He returns to the office today for ongoing follow-up of low back and left leg pain with weakness.  He was last seen in May with complaints of the left leg starting to give out and going numb.  He had chronic low back discomfort for many years due to a mechanical injury while in the [employer].    He has had weakness in the left leg that started following an epidural injection in November 2017.  He states that the leg gets weak or the further he walks.  There is no associated numbness or tingling in the left leg and he denies any bowel or bladder problems. He would like to proceed forward with surgical intervention at this time.     During his cardiac workup to receive surgical cardiac clearance from Dr Anderson, he was found to have severe stenosis in the LAD and underwent drug-eluting stent placement.  He has been on Plavix and aspirin since.  He is okay to move forward with surgery from a cardiology standpoint.    Today, he reports that low back pain has worsened as he is now off all anti-inflammatories due to being on the Plavix.  He feels that his gait has improved overall and he feels stable when walking.  He continues to deny any bowel or bladder issues.    He presents with his wife.    /60 (BP Location: Left arm, Cuff Size: Large Adult)   Pulse 68   Resp 16   Wt 126 kg (278 lb)   BMI 42.27 kg/m²     The following portions of the patient's history were reviewed and updated as appropriate: allergies, current medications, past family history, past medical history, past social history, past surgical history and problem list.    Review of Systems   HENT: Negative for trouble swallowing.    Respiratory: Negative for cough and wheezing.     Cardiovascular: Negative for leg swelling.   Genitourinary: Negative for difficulty urinating and enuresis.   Musculoskeletal: Positive for back pain. Negative for gait problem.   Skin: Negative for rash.   Neurological: Positive for weakness (left leg). Negative for numbness.       Objective   Physical Exam   Constitutional: He is oriented to person, place, and time. He appears well-developed and well-nourished. He is cooperative.  Non-toxic appearance. He does not have a sickly appearance. He does not appear ill.   Very pleasant obese older gentleman   HENT:   Head: Normocephalic and atraumatic.   Eyes:   Corrective lenses   Neck: Neck supple. No tracheal deviation present.   Pulmonary/Chest: Effort normal.   Musculoskeletal: Normal range of motion. He exhibits tenderness (Minimal bilateral low back tenderness at approximately L4 5). He exhibits no deformity.   Moving all extremities well  Weakness left quad, hamstring and iliopsoas 4/5  Decreased lumbar range of motion in all directions except flexion   Neurological: He is alert and oriented to person, place, and time. He displays no tremor and normal reflexes. No cranial nerve deficit or sensory deficit. He exhibits normal muscle tone. Coordination and gait normal. GCS eye subscore is 4. GCS verbal subscore is 5. GCS motor subscore is 6.   Reflex Scores:       Patellar reflexes are 2+ on the right side and 2+ on the left side.       Achilles reflexes are 2+ on the right side and 2+ on the left side.  Gait is stable and upright, able to heel and toe walk, able to tandem  Decreased sensation left lateral calf to soft touch, normal to vibration and temperature, otherwise sensation intact  Negative straight leg raise bilaterally  Negative clonus   Skin: Skin is warm and dry.   Psychiatric: He has a normal mood and affect. His behavior is normal. Thought content normal.   Vitals reviewed.    Neurologic Exam     Mental Status   Oriented to person, place, and time.      Gait, Coordination, and Reflexes     Reflexes   Right patellar: 2+  Left patellar: 2+  Right achilles: 2+  Left achilles: 2+      Assessment/Plan   Independent Review of Radiographic Studies:    No new imaging    Consulting providers notes reviewed      Medical Decision Making:    He returns to the office today with history of chronic low back pain and left leg weakness.  Exam as noted above, no red flags.  It is been greater than 6 months since his last office visit secondary to the discovery of severe stenosis in the left LAD during cardiac workup for cardiac clearance.  He underwent drug-eluting stent placement has been on Plavix.  He is just not able to briefly come off the Plavix to undergo surgical intervention of the left leg weakness.    However, he does have new weakness on exam as documented above.  Therefore, we will undergo lumbar MRI imaging with and without contrast.  He is not able to take the anti-inflammatories due to being on the Plavix and aspirin.  We will give him a prescription for gabapentin in hopes that this helps with some of the nerve discomfort in the leg.  He is to take as directed.  I've also given him up her prescription for Valium to take secondary to claustrophobia with the MRI.  Once in imaging is obtained we will have him return to the office to see Dr. Chowdhury for additional surgical discussion.    Plan: MRI lumbar spine, gabapentin as directed, return to office following  Will was seen today for back pain and extremity weakness.    Diagnoses and all orders for this visit:    Spinal stenosis, lumbar region, with neurogenic claudication  -     MRI Lumbar Spine With & Without Contrast; Future    Left leg weakness  -     MRI Lumbar Spine With & Without Contrast; Future    Other orders  -     gabapentin (NEURONTIN) 300 MG capsule; Take 1 capsule by mouth 3 (Three) Times a Day.  -     diazePAM (VALIUM) 5 MG tablet; Take 1 tablet by mouth 2 (Two) Times a Day As Needed for  Anxiety.      No Follow-up on file.

## 2019-02-12 ENCOUNTER — OFFICE VISIT (OUTPATIENT)
Dept: NEUROSURGERY | Facility: CLINIC | Age: 74
End: 2019-02-12

## 2019-02-12 VITALS
RESPIRATION RATE: 16 BRPM | HEART RATE: 84 BPM | DIASTOLIC BLOOD PRESSURE: 50 MMHG | SYSTOLIC BLOOD PRESSURE: 100 MMHG | WEIGHT: 278 LBS | BODY MASS INDEX: 42.27 KG/M2

## 2019-02-12 DIAGNOSIS — R29.898 LEFT LEG WEAKNESS: ICD-10-CM

## 2019-02-12 DIAGNOSIS — E11.40 TYPE 2 DIABETES MELLITUS WITH DIABETIC NEUROPATHY, WITH LONG-TERM CURRENT USE OF INSULIN (HCC): ICD-10-CM

## 2019-02-12 DIAGNOSIS — E66.01 MORBID OBESITY WITH BMI OF 40.0-44.9, ADULT (HCC): ICD-10-CM

## 2019-02-12 DIAGNOSIS — G47.33 OBSTRUCTIVE SLEEP APNEA: ICD-10-CM

## 2019-02-12 DIAGNOSIS — Z79.4 TYPE 2 DIABETES MELLITUS WITH DIABETIC NEUROPATHY, WITH LONG-TERM CURRENT USE OF INSULIN (HCC): ICD-10-CM

## 2019-02-12 DIAGNOSIS — I10 ESSENTIAL HYPERTENSION: ICD-10-CM

## 2019-02-12 DIAGNOSIS — M48.062 SPINAL STENOSIS, LUMBAR REGION, WITH NEUROGENIC CLAUDICATION: Primary | ICD-10-CM

## 2019-02-12 PROCEDURE — 99214 OFFICE O/P EST MOD 30 MIN: CPT | Performed by: NEUROLOGICAL SURGERY

## 2019-02-12 RX ORDER — LOSARTAN POTASSIUM 100 MG/1
100 TABLET ORAL DAILY
COMMUNITY
Start: 2019-01-31

## 2019-02-12 RX ORDER — ATORVASTATIN CALCIUM 10 MG/1
10 TABLET, FILM COATED ORAL TAKE AS DIRECTED
COMMUNITY
Start: 2019-01-29

## 2019-02-12 RX ORDER — CLINDAMYCIN PHOSPHATE 900 MG/50ML
900 INJECTION INTRAVENOUS ONCE
Status: CANCELLED | OUTPATIENT
Start: 2019-02-27 | End: 2019-02-12

## 2019-02-12 NOTE — PROGRESS NOTES
Subjective   Patient ID: Will Garcia is a 73 y.o. male is here today for follow-up of LLE weakness. He is accompanied by his wife.    History of Present Illness     The patient returns to the office after his office visit in May 2018.  He underwent a cardiac clearance that did not clear him for surgical intervention at that time but rather discovered significant left anterior descending artery stenosis.  He underwent a cardiac procedure-angioplasty and stent placement shortly thereafter.  From the cardiac standpoint he is doing well.  We will of course confirmed with Dr. Mireles whether or not he would be okay for him to be off of his aspirin and Plavix for a couple of days (5) before surgery and then resume the aspirin and Plavix the day after surgery0.    The patient continues as described in his recent office visit to have weakness in the left lower extremity.  He notes a sense of trembling in the leg.  He denies significant pain in the lower extremity.  He does have some back discomfort.    On his last office visit here Taylor Licea prescribed Neurontin and this is improved his back pain.    The following portions of the patient's history were reviewed and updated as appropriate: allergies, current medications, past family history, past medical history, past social history, past surgical history and problem list.    Review of Systems   Musculoskeletal: Positive for back pain and gait problem.   Neurological: Positive for weakness (LLE) and numbness.       Objective   Physical Exam   Constitutional: He appears well-developed and well-nourished.   Neck: Carotid bruit is not present.   Cardiovascular: Normal rate, regular rhythm and normal heart sounds.     Neurologic Exam     Motor Exam     Strength   Strength 5/5 except as noted.   Left quadriceps: 4/5  Left hamstrin/5  Left anterior tibial: 4/5  Left peroneal: 4/5  Left gastroc: 4/5    Sensory Exam   Left lateral calf decrease LT and PP     Gait,  Coordination, and Reflexes Mild left foot and calf drop with walking       Assessment/Plan   Independent Review of Radiographic Studies:    I personally reviewed the MRI scan of the lumbar spine done recently that demonstrates moderately severe lumbar spinal stenosis at L4-5 with lateral recess stenosis.  In addition there is epidural lipomatosis at L5-S1.  Medical Decision Making:    Patient presents with the above-noted history of physical findings.  He has neurogenic claudication with lumbar spinal stenosis and weakness in the lower extremity.    Treatment options include continued conservative treatment which at this point would include significant weight loss, exercise, hope, and prayer.    Other treatment options would include a lumbar decompression at L4-5.    In my opinion he has about a 60-70% chance of significant improvement in his lower extremity symptomatology undergoing a lumbar surgery.  Additionally I do think that with conservative treatment which would be actually quite a bit harder for him he has about the same chance of continued improvement.    The risks, benefits, and alternatives of an open or MAST discectomy and/or decompression were explained in detail to the patient. The alternative is not to perform an operative procedure. The benefit should be, though is not guaranteed, an improvement in the patient's neurosensory and/or motor dysfunction. The risks include, but are not limited to, the possibility of death, infection, stroke, bleeding, blindness, paralysis , lack of improvement or worsening of the pain syndrome, recurrence of the pain syndrome, meningitis, osteomyelitis, recurrent disc herniation, spinal instability, need for further operative intervention, sexual dysfunction, incontinence, inability to urinate without catheterization, inability to have a normal bowel movement, epidural scarring resulting in chronic back pain, leakage of cerebral spinal fluid at the time of surgery or  after recovery from surgery requiring further operative intervention. I also explained the realistic expectations as they pertain to the procedure. The patient voiced understanding of these risks, benefits, alternatives, and realistic expectations and requests that we proceed with the operative intervention.    I explained also that I could not tell whether or not he would have an improvement because I do not think anyone can tell whether or not individual would have improvement decompressing the nerves that are compressed.  It simply however logically make sense that the best chance of improvement would be achieved by decompressing the nerves and hopefully allowing some healing.    After a complete physical exam, the patient has been informed of the consequences, benefits, appropriate us, and office policies regarding the medication being prescribed. A ANTONIO check will be made on-line, and will be repeated if prescription is renewed after a 90 day period. The patient agrees to adhering to the medication regimen as prescribed.    The patient has been advised that we will manage post-operative pain for 1 month. If further narcotic medication is needed beyond that period, a referral back to the primary care physician or to a pain management specialist will be made. If the patient cancels or fails to show for scheduled follow-up visits or the pain management referral,  further narcotic prescriptions from this practice may cease.    Plan: L4-5 lumbar decompression with overnight observation    Will was seen today for extremity weakness.    Diagnoses and all orders for this visit:    Spinal stenosis, lumbar region, with neurogenic claudication  -     Case Request; Standing  -     CBC and Differential; Future  -     Basic metabolic panel; Future  -     ECG 12 Lead; Future  -     clindamycin (CLEOCIN) 900 mg in dextrose (D5W) 5 % 100 mL IVPB; Infuse 900 mg into a venous catheter 1 (One) Time.  -     Case Request    Left  leg weakness    Morbid obesity with BMI of 40.0-44.9, adult (CMS/[facility])    Obstructive sleep apnea    Essential hypertension    Type 2 diabetes mellitus with diabetic neuropathy, with long-term current use of insulin (CMS/[facility])    Other orders  -     Follow Anesthesia Guidelines / Standing Orders; Future  -     Obtain informed consent  -     Provide NPO Instructions to Patient; Future  -     Clorhexidine skin prep  -     Follow Anesthesia Guidelines / Standing Orders; Standing  -     SCD (sequential compression device)- to be placed on patient in Pre-op; Standing  -     Initiate Observation Status; Standing  -     POC Glucose Once; Standing      Return for Recheck 2 weeks after surgery, Follow up with nurse practitioner.

## 2019-02-13 ENCOUNTER — TELEPHONE (OUTPATIENT)
Dept: NEUROSURGERY | Facility: CLINIC | Age: 74
End: 2019-02-13

## 2019-02-13 NOTE — TELEPHONE ENCOUNTER
Please see my last note.  Yes okay to proceed with surgery I outlined holding his medicines and hopefully my last office note will suffice for any clearance letter.  If not please call me back

## 2019-02-13 NOTE — TELEPHONE ENCOUNTER
We are trying to schedule this pt with Dr. Chowdhury for a lumbar decompression. Is he ok for surgery and can he be off his plavix and aspirin 5 days prior to surgery? Thank you!

## 2019-02-17 ENCOUNTER — RESULTS ENCOUNTER (OUTPATIENT)
Dept: NEUROSURGERY | Facility: CLINIC | Age: 74
End: 2019-02-17

## 2019-02-17 DIAGNOSIS — M48.062 SPINAL STENOSIS, LUMBAR REGION, WITH NEUROGENIC CLAUDICATION: ICD-10-CM

## 2019-02-19 ENCOUNTER — APPOINTMENT (OUTPATIENT)
Dept: PREADMISSION TESTING | Facility: HOSPITAL | Age: 74
End: 2019-02-19

## 2019-02-19 DIAGNOSIS — M48.062 SPINAL STENOSIS, LUMBAR REGION, WITH NEUROGENIC CLAUDICATION: ICD-10-CM

## 2019-02-19 LAB
ANION GAP SERPL CALCULATED.3IONS-SCNC: 13.4 MMOL/L
BASOPHILS # BLD AUTO: 0.03 10*3/MM3 (ref 0–0.2)
BASOPHILS NFR BLD AUTO: 0.4 % (ref 0–1.5)
BUN BLD-MCNC: 15 MG/DL (ref 8–23)
BUN/CREAT SERPL: 17.4 (ref 7–25)
CALCIUM SPEC-SCNC: 9.6 MG/DL (ref 8.6–10.5)
CHLORIDE SERPL-SCNC: 101 MMOL/L (ref 98–107)
CO2 SERPL-SCNC: 25.6 MMOL/L (ref 22–29)
CREAT BLD-MCNC: 0.86 MG/DL (ref 0.76–1.27)
DEPRECATED RDW RBC AUTO: 43.8 FL (ref 37–54)
EOSINOPHIL # BLD AUTO: 0.11 10*3/MM3 (ref 0–0.4)
EOSINOPHIL NFR BLD AUTO: 1.5 % (ref 0.3–6.2)
ERYTHROCYTE [DISTWIDTH] IN BLOOD BY AUTOMATED COUNT: 13.5 % (ref 12.3–15.4)
GFR SERPL CREATININE-BSD FRML MDRD: 87 ML/MIN/1.73
GLUCOSE BLD-MCNC: 272 MG/DL (ref 65–99)
HCT VFR BLD AUTO: 42 % (ref 37.5–51)
HGB BLD-MCNC: 13.6 G/DL (ref 13–17.7)
IMM GRANULOCYTES # BLD AUTO: 0.02 10*3/MM3 (ref 0–0.05)
IMM GRANULOCYTES NFR BLD AUTO: 0.3 % (ref 0–0.5)
LYMPHOCYTES # BLD AUTO: 2.01 10*3/MM3 (ref 0.7–3.1)
LYMPHOCYTES NFR BLD AUTO: 27.1 % (ref 19.6–45.3)
MCH RBC QN AUTO: 28.7 PG (ref 26.6–33)
MCHC RBC AUTO-ENTMCNC: 32.4 G/DL (ref 31.5–35.7)
MCV RBC AUTO: 88.6 FL (ref 79–97)
MONOCYTES # BLD AUTO: 0.61 10*3/MM3 (ref 0.1–0.9)
MONOCYTES NFR BLD AUTO: 8.2 % (ref 5–12)
NEUTROPHILS # BLD AUTO: 4.63 10*3/MM3 (ref 1.4–7)
NEUTROPHILS NFR BLD AUTO: 62.5 % (ref 42.7–76)
NRBC BLD AUTO-RTO: 0 /100 WBC (ref 0–0)
PLATELET # BLD AUTO: 239 10*3/MM3 (ref 140–450)
PMV BLD AUTO: 10.1 FL (ref 6–12)
POTASSIUM BLD-SCNC: 4 MMOL/L (ref 3.5–5.2)
RBC # BLD AUTO: 4.74 10*6/MM3 (ref 4.14–5.8)
SODIUM BLD-SCNC: 140 MMOL/L (ref 136–145)
WBC NRBC COR # BLD: 7.41 10*3/MM3 (ref 3.4–10.8)

## 2019-02-19 PROCEDURE — 36415 COLL VENOUS BLD VENIPUNCTURE: CPT | Performed by: NEUROLOGICAL SURGERY

## 2019-02-19 PROCEDURE — 80048 BASIC METABOLIC PNL TOTAL CA: CPT | Performed by: NEUROLOGICAL SURGERY

## 2019-02-19 PROCEDURE — 93005 ELECTROCARDIOGRAM TRACING: CPT

## 2019-02-19 PROCEDURE — 93010 ELECTROCARDIOGRAM REPORT: CPT | Performed by: INTERNAL MEDICINE

## 2019-02-19 PROCEDURE — 85025 COMPLETE CBC W/AUTO DIFF WBC: CPT | Performed by: NEUROLOGICAL SURGERY

## 2019-02-19 NOTE — DISCHARGE INSTRUCTIONS
Take the following medications the morning of surgery with a small sip of water:    LOSARTAN, GABAPENTIN AND METOPROLOL    ARRIVE AT 6:00    General Instructions:  • Do not eat or drink anything after midnight the night before surgery.  • Infants may have breast milk up to four hours before surgery.  • Infants drinking formula may drink formula up to six hours before surgery.   • Patients who avoid smoking, chewing tobacco and alcohol for 4 weeks prior to surgery have a reduced risk of post-operative complications.  Quit smoking as many days before surgery as you can.  • Do not smoke, use chewing tobacco or drink alcohol the day of surgery.   • If applicable bring your C-PAP/ BI-PAP machine.  • Bring any papers given to you in the doctor’s office.  • Wear clean comfortable clothes and socks.  • Do not wear contact lenses or make-up.  Bring a case for your glasses.   • Bring crutches or walker if applicable.  • Remove all piercings.  Leave jewelry and any other valuables at home.  • Hair extensions with metal clips must be removed prior to surgery.  • The Pre-Admission Testing nurse will instruct you to bring medications if unable to obtain an accurate list in Pre-Admission Testing.        If you were given a blood bank ID arm band remember to bring it with you the day of surgery.    Preventing a Surgical Site Infection:  • For 2 to 3 days before surgery, avoid shaving with a razor because the razor can irritate skin and make it easier to develop an infection.    • Any areas of open skin can increase the risk of a post-operative wound infection by allowing bacteria to enter and travel throughout the body.  Notify your surgeon if you have any skin wounds / rashes even if it is not near the expected surgical site.  The area will need assessed to determine if surgery should be delayed until it is healed.  • The night prior to surgery sleep in a clean bed with clean clothing.  Do not allow pets to sleep with  you.  • Shower on the morning of surgery using a fresh bar of anti-bacterial soap (such as Dial) and clean washcloth.  Dry with a clean towel and dress in clean clothing.  • Ask your surgeon if you will be receiving antibiotics prior to surgery.  • Make sure you, your family, and all healthcare providers clean their hands with soap and water or an alcohol based hand  before caring for you or your wound.    Day of surgery:  Upon arrival, a Pre-op nurse and Anesthesiologist will review your health history, obtain vital signs, and answer questions you may have.  The only belongings needed at this time will be your home medications and if applicable your C-PAP/BI-PAP machine.  If you are staying overnight your family can leave the rest of your belongings in the car and bring them to your room later.  A Pre-op nurse will start an IV and you may receive medication in preparation for surgery, including something to help you relax.  Your family will be able to see you in the Pre-op area.  While you are in surgery your family should notify the waiting room  if they leave the waiting room area and provide a contact phone number.    Please be aware that surgery does come with discomfort.  We want to make every effort to control your discomfort so please discuss any uncontrolled symptoms with your nurse.   Your doctor will most likely have prescribed pain medications.      If you are going home after surgery you will receive individualized written care instructions before being discharged.  A responsible adult must drive you to and from the hospital on the day of your surgery and stay with you for 24 hours.    If you are staying overnight following surgery, you will be transported to your hospital room following the recovery period.  [facility] has all private rooms.    You have received a list of surgical assistants for your reference.  If you have any questions please call Pre-Admission  Testing at [phone].  Deductibles and co-payments are collected on the day of service. Please be prepared to pay the required co-pay, deductible or deposit on the day of service as defined by your plan.

## 2019-02-21 NOTE — TELEPHONE ENCOUNTER
Dr. Lemus called today and said from his standpoint pt is cleared for surgery and he is ok for those medications to be stopped 5 days prior. He stated that pt is not very compliant with his diabetic medication so he may or may not have high blood sugars the day of surgery.    Any questions you can call his office at [phone]

## 2019-02-22 NOTE — TELEPHONE ENCOUNTER
Spoke with patient and advised him that he was cleared by Dr. Lemus however that he needed to watch his glucose and be compliant with his diabetic meds. He acknowledged this.

## 2019-02-27 ENCOUNTER — ANESTHESIA EVENT (OUTPATIENT)
Dept: PERIOP | Facility: HOSPITAL | Age: 74
End: 2019-02-27

## 2019-02-27 ENCOUNTER — APPOINTMENT (OUTPATIENT)
Dept: GENERAL RADIOLOGY | Facility: HOSPITAL | Age: 74
End: 2019-02-27

## 2019-02-27 ENCOUNTER — HOSPITAL ENCOUNTER (OUTPATIENT)
Facility: HOSPITAL | Age: 74
Discharge: HOME | End: 2019-02-28
Attending: NEUROLOGICAL SURGERY | Admitting: NEUROLOGICAL SURGERY

## 2019-02-27 ENCOUNTER — ANESTHESIA (OUTPATIENT)
Dept: PERIOP | Facility: HOSPITAL | Age: 74
End: 2019-02-27

## 2019-02-27 DIAGNOSIS — M48.062 SPINAL STENOSIS, LUMBAR REGION, WITH NEUROGENIC CLAUDICATION: ICD-10-CM

## 2019-02-27 DIAGNOSIS — R29.898 LEFT LEG WEAKNESS: Primary | ICD-10-CM

## 2019-02-27 LAB
ANION GAP SERPL CALCULATED.3IONS-SCNC: 14 MMOL/L
BASOPHILS # BLD AUTO: 0.01 10*3/MM3 (ref 0–0.2)
BASOPHILS NFR BLD AUTO: 0.1 % (ref 0–1.5)
BUN BLD-MCNC: 19 MG/DL (ref 8–23)
BUN/CREAT SERPL: 21.8 (ref 7–25)
CALCIUM SPEC-SCNC: 9.4 MG/DL (ref 8.6–10.5)
CHLORIDE SERPL-SCNC: 102 MMOL/L (ref 98–107)
CO2 SERPL-SCNC: 24 MMOL/L (ref 22–29)
CREAT BLD-MCNC: 0.87 MG/DL (ref 0.76–1.27)
DEPRECATED RDW RBC AUTO: 43.5 FL (ref 37–54)
EOSINOPHIL # BLD AUTO: 0.02 10*3/MM3 (ref 0–0.4)
EOSINOPHIL NFR BLD AUTO: 0.2 % (ref 0.3–6.2)
ERYTHROCYTE [DISTWIDTH] IN BLOOD BY AUTOMATED COUNT: 13.2 % (ref 12.3–15.4)
GFR SERPL CREATININE-BSD FRML MDRD: 86 ML/MIN/1.73
GLUCOSE BLD-MCNC: 256 MG/DL (ref 65–99)
GLUCOSE BLDC GLUCOMTR-MCNC: 203 MG/DL (ref 70–130)
GLUCOSE BLDC GLUCOMTR-MCNC: 224 MG/DL (ref 70–130)
GLUCOSE BLDC GLUCOMTR-MCNC: 302 MG/DL (ref 70–130)
HCT VFR BLD AUTO: 39.7 % (ref 37.5–51)
HGB BLD-MCNC: 12.8 G/DL (ref 13–17.7)
IMM GRANULOCYTES # BLD AUTO: 0.05 10*3/MM3 (ref 0–0.05)
IMM GRANULOCYTES NFR BLD AUTO: 0.5 % (ref 0–0.5)
LYMPHOCYTES # BLD AUTO: 0.99 10*3/MM3 (ref 0.7–3.1)
LYMPHOCYTES NFR BLD AUTO: 9.6 % (ref 19.6–45.3)
MCH RBC QN AUTO: 28.8 PG (ref 26.6–33)
MCHC RBC AUTO-ENTMCNC: 32.2 G/DL (ref 31.5–35.7)
MCV RBC AUTO: 89.2 FL (ref 79–97)
MONOCYTES # BLD AUTO: 0.23 10*3/MM3 (ref 0.1–0.9)
MONOCYTES NFR BLD AUTO: 2.2 % (ref 5–12)
NEUTROPHILS # BLD AUTO: 9.05 10*3/MM3 (ref 1.4–7)
NEUTROPHILS NFR BLD AUTO: 87.4 % (ref 42.7–76)
NRBC BLD AUTO-RTO: 0 /100 WBC (ref 0–0)
PLATELET # BLD AUTO: 208 10*3/MM3 (ref 140–450)
PMV BLD AUTO: 10.1 FL (ref 6–12)
POTASSIUM BLD-SCNC: 3.7 MMOL/L (ref 3.5–5.2)
RBC # BLD AUTO: 4.45 10*6/MM3 (ref 4.14–5.8)
SODIUM BLD-SCNC: 140 MMOL/L (ref 136–145)
WBC NRBC COR # BLD: 10.35 10*3/MM3 (ref 3.4–10.8)

## 2019-02-27 PROCEDURE — G0378 HOSPITAL OBSERVATION PER HR: HCPCS

## 2019-02-27 PROCEDURE — 25010000002 PHENYLEPHRINE PER 1 ML: Performed by: NURSE ANESTHETIST, CERTIFIED REGISTERED

## 2019-02-27 PROCEDURE — 63710000001 INSULIN LISPRO (HUMAN) PER 5 UNITS: Performed by: NURSE PRACTITIONER

## 2019-02-27 PROCEDURE — 82962 GLUCOSE BLOOD TEST: CPT

## 2019-02-27 PROCEDURE — 25010000002 ONDANSETRON PER 1 MG: Performed by: NURSE ANESTHETIST, CERTIFIED REGISTERED

## 2019-02-27 PROCEDURE — 25010000002 SUCCINYLCHOLINE PER 20 MG: Performed by: NURSE ANESTHETIST, CERTIFIED REGISTERED

## 2019-02-27 PROCEDURE — 25010000002 MIDAZOLAM PER 1 MG: Performed by: ANESTHESIOLOGY

## 2019-02-27 PROCEDURE — 25010000002 FENTANYL CITRATE (PF) 100 MCG/2ML SOLUTION: Performed by: NURSE ANESTHETIST, CERTIFIED REGISTERED

## 2019-02-27 PROCEDURE — 63047 LAM FACETEC & FORAMOT LUMBAR: CPT | Performed by: NEUROLOGICAL SURGERY

## 2019-02-27 PROCEDURE — 63047 LAM FACETEC & FORAMOT LUMBAR: CPT | Performed by: SPECIALIST/TECHNOLOGIST, OTHER

## 2019-02-27 PROCEDURE — 85025 COMPLETE CBC W/AUTO DIFF WBC: CPT | Performed by: NURSE PRACTITIONER

## 2019-02-27 PROCEDURE — 72020 X-RAY EXAM OF SPINE 1 VIEW: CPT

## 2019-02-27 PROCEDURE — 80048 BASIC METABOLIC PNL TOTAL CA: CPT | Performed by: NURSE PRACTITIONER

## 2019-02-27 PROCEDURE — 25010000002 DEXAMETHASONE PER 1 MG: Performed by: NURSE ANESTHETIST, CERTIFIED REGISTERED

## 2019-02-27 PROCEDURE — 25010000002 PROPOFOL 10 MG/ML EMULSION: Performed by: NURSE ANESTHETIST, CERTIFIED REGISTERED

## 2019-02-27 PROCEDURE — 25010000002 KETOROLAC TROMETHAMINE PER 15 MG: Performed by: NURSE ANESTHETIST, CERTIFIED REGISTERED

## 2019-02-27 PROCEDURE — 76000 FLUOROSCOPY <1 HR PHYS/QHP: CPT

## 2019-02-27 PROCEDURE — 25010000002 VANCOMYCIN 1 G RECONSTITUTED SOLUTION 1 EACH VIAL: Performed by: NEUROLOGICAL SURGERY

## 2019-02-27 DEVICE — WAX BONE HEMO NAT 2.5G: Type: IMPLANTABLE DEVICE | Site: SPINE LUMBAR | Status: FUNCTIONAL

## 2019-02-27 RX ORDER — SODIUM CHLORIDE 0.9 % (FLUSH) 0.9 %
3-10 SYRINGE (ML) INJECTION AS NEEDED
Status: DISCONTINUED | OUTPATIENT
Start: 2019-02-27 | End: 2019-02-28 | Stop reason: HOSPADM

## 2019-02-27 RX ORDER — HYDROCODONE BITARTRATE AND ACETAMINOPHEN 7.5; 325 MG/1; MG/1
1 TABLET ORAL ONCE AS NEEDED
Status: DISCONTINUED | OUTPATIENT
Start: 2019-02-27 | End: 2019-02-27 | Stop reason: HOSPADM

## 2019-02-27 RX ORDER — ONDANSETRON 2 MG/ML
4 INJECTION INTRAMUSCULAR; INTRAVENOUS ONCE AS NEEDED
Status: COMPLETED | OUTPATIENT
Start: 2019-02-27 | End: 2019-02-27

## 2019-02-27 RX ORDER — PROMETHAZINE HYDROCHLORIDE 25 MG/1
25 SUPPOSITORY RECTAL ONCE AS NEEDED
Status: DISCONTINUED | OUTPATIENT
Start: 2019-02-27 | End: 2019-02-27 | Stop reason: HOSPADM

## 2019-02-27 RX ORDER — DOCUSATE SODIUM 100 MG/1
100 CAPSULE, LIQUID FILLED ORAL 3 TIMES DAILY PRN
Status: DISCONTINUED | OUTPATIENT
Start: 2019-02-27 | End: 2019-02-28 | Stop reason: HOSPADM

## 2019-02-27 RX ORDER — CHLORHEXIDINE GLUCONATE 4 G/100ML
SOLUTION TOPICAL DAILY PRN
COMMUNITY
End: 2019-02-28 | Stop reason: HOSPADM

## 2019-02-27 RX ORDER — SODIUM CHLORIDE, SODIUM LACTATE, POTASSIUM CHLORIDE, CALCIUM CHLORIDE 600; 310; 30; 20 MG/100ML; MG/100ML; MG/100ML; MG/100ML
INJECTION, SOLUTION INTRAVENOUS CONTINUOUS PRN
Status: DISCONTINUED | OUTPATIENT
Start: 2019-02-27 | End: 2019-02-27 | Stop reason: SURG

## 2019-02-27 RX ORDER — FUROSEMIDE 80 MG
80 TABLET ORAL EVERY MORNING
Status: DISCONTINUED | OUTPATIENT
Start: 2019-02-28 | End: 2019-02-28 | Stop reason: HOSPADM

## 2019-02-27 RX ORDER — LOSARTAN POTASSIUM 100 MG/1
100 TABLET ORAL
Status: DISCONTINUED | OUTPATIENT
Start: 2019-02-27 | End: 2019-02-28 | Stop reason: HOSPADM

## 2019-02-27 RX ORDER — OXYCODONE AND ACETAMINOPHEN 7.5; 325 MG/1; MG/1
1 TABLET ORAL ONCE AS NEEDED
Status: DISCONTINUED | OUTPATIENT
Start: 2019-02-27 | End: 2019-02-27 | Stop reason: HOSPADM

## 2019-02-27 RX ORDER — FENTANYL CITRATE 50 UG/ML
50 INJECTION, SOLUTION INTRAMUSCULAR; INTRAVENOUS
Status: DISCONTINUED | OUTPATIENT
Start: 2019-02-27 | End: 2019-02-27 | Stop reason: HOSPADM

## 2019-02-27 RX ORDER — KETOROLAC TROMETHAMINE 30 MG/ML
INJECTION, SOLUTION INTRAMUSCULAR; INTRAVENOUS AS NEEDED
Status: DISCONTINUED | OUTPATIENT
Start: 2019-02-27 | End: 2019-02-27 | Stop reason: SURG

## 2019-02-27 RX ORDER — ACETAMINOPHEN 325 MG/1
650 TABLET ORAL ONCE AS NEEDED
Status: DISCONTINUED | OUTPATIENT
Start: 2019-02-27 | End: 2019-02-27 | Stop reason: HOSPADM

## 2019-02-27 RX ORDER — LIDOCAINE HYDROCHLORIDE 20 MG/ML
INJECTION, SOLUTION INFILTRATION; PERINEURAL AS NEEDED
Status: DISCONTINUED | OUTPATIENT
Start: 2019-02-27 | End: 2019-02-27 | Stop reason: SURG

## 2019-02-27 RX ORDER — HYDROMORPHONE HYDROCHLORIDE 1 MG/ML
0.5 INJECTION, SOLUTION INTRAMUSCULAR; INTRAVENOUS; SUBCUTANEOUS
Status: DISCONTINUED | OUTPATIENT
Start: 2019-02-27 | End: 2019-02-27 | Stop reason: HOSPADM

## 2019-02-27 RX ORDER — ROCURONIUM BROMIDE 10 MG/ML
INJECTION, SOLUTION INTRAVENOUS AS NEEDED
Status: DISCONTINUED | OUTPATIENT
Start: 2019-02-27 | End: 2019-02-27 | Stop reason: SURG

## 2019-02-27 RX ORDER — EPHEDRINE SULFATE 50 MG/ML
INJECTION, SOLUTION INTRAVENOUS AS NEEDED
Status: DISCONTINUED | OUTPATIENT
Start: 2019-02-27 | End: 2019-02-27 | Stop reason: SURG

## 2019-02-27 RX ORDER — MEPERIDINE HYDROCHLORIDE 25 MG/ML
12.5 INJECTION INTRAMUSCULAR; INTRAVENOUS; SUBCUTANEOUS
Status: DISCONTINUED | OUTPATIENT
Start: 2019-02-27 | End: 2019-02-27 | Stop reason: HOSPADM

## 2019-02-27 RX ORDER — SODIUM CHLORIDE, SODIUM LACTATE, POTASSIUM CHLORIDE, CALCIUM CHLORIDE 600; 310; 30; 20 MG/100ML; MG/100ML; MG/100ML; MG/100ML
9 INJECTION, SOLUTION INTRAVENOUS CONTINUOUS PRN
Status: DISCONTINUED | OUTPATIENT
Start: 2019-02-27 | End: 2019-02-28 | Stop reason: HOSPADM

## 2019-02-27 RX ORDER — ATORVASTATIN CALCIUM 10 MG/1
10 TABLET, FILM COATED ORAL
Status: DISCONTINUED | OUTPATIENT
Start: 2019-02-27 | End: 2019-02-28 | Stop reason: HOSPADM

## 2019-02-27 RX ORDER — ALLOPURINOL 100 MG/1
100 TABLET ORAL NIGHTLY
Status: DISCONTINUED | OUTPATIENT
Start: 2019-02-27 | End: 2019-02-28 | Stop reason: HOSPADM

## 2019-02-27 RX ORDER — FENTANYL CITRATE 50 UG/ML
INJECTION, SOLUTION INTRAMUSCULAR; INTRAVENOUS AS NEEDED
Status: DISCONTINUED | OUTPATIENT
Start: 2019-02-27 | End: 2019-02-27 | Stop reason: SURG

## 2019-02-27 RX ORDER — MIDAZOLAM HYDROCHLORIDE 1 MG/ML
1 INJECTION INTRAMUSCULAR; INTRAVENOUS
Status: DISCONTINUED | OUTPATIENT
Start: 2019-02-27 | End: 2019-02-27 | Stop reason: HOSPADM

## 2019-02-27 RX ORDER — PROMETHAZINE HYDROCHLORIDE 25 MG/ML
12.5 INJECTION, SOLUTION INTRAMUSCULAR; INTRAVENOUS ONCE AS NEEDED
Status: DISCONTINUED | OUTPATIENT
Start: 2019-02-27 | End: 2019-02-27 | Stop reason: HOSPADM

## 2019-02-27 RX ORDER — MIDAZOLAM HYDROCHLORIDE 1 MG/ML
2 INJECTION INTRAMUSCULAR; INTRAVENOUS
Status: DISCONTINUED | OUTPATIENT
Start: 2019-02-27 | End: 2019-02-27 | Stop reason: HOSPADM

## 2019-02-27 RX ORDER — TAMSULOSIN HYDROCHLORIDE 0.4 MG/1
0.8 CAPSULE ORAL NIGHTLY
Status: DISCONTINUED | OUTPATIENT
Start: 2019-02-27 | End: 2019-02-28 | Stop reason: HOSPADM

## 2019-02-27 RX ORDER — PROMETHAZINE HYDROCHLORIDE 25 MG/1
25 TABLET ORAL ONCE AS NEEDED
Status: DISCONTINUED | OUTPATIENT
Start: 2019-02-27 | End: 2019-02-27 | Stop reason: HOSPADM

## 2019-02-27 RX ORDER — LATANOPROST 50 UG/ML
1 SOLUTION/ DROPS OPHTHALMIC NIGHTLY
Status: DISCONTINUED | OUTPATIENT
Start: 2019-02-27 | End: 2019-02-28 | Stop reason: HOSPADM

## 2019-02-27 RX ORDER — HYDROCODONE BITARTRATE AND ACETAMINOPHEN 5; 325 MG/1; MG/1
1-2 TABLET ORAL EVERY 4 HOURS PRN
Qty: 15 TABLET | Refills: 0 | Status: SHIPPED | OUTPATIENT
Start: 2019-02-27 | End: 2019-03-08 | Stop reason: SDUPTHER

## 2019-02-27 RX ORDER — SUCCINYLCHOLINE CHLORIDE 20 MG/ML
INJECTION INTRAMUSCULAR; INTRAVENOUS AS NEEDED
Status: DISCONTINUED | OUTPATIENT
Start: 2019-02-27 | End: 2019-02-27 | Stop reason: SURG

## 2019-02-27 RX ORDER — SODIUM CHLORIDE 0.9 % (FLUSH) 0.9 %
3 SYRINGE (ML) INJECTION EVERY 12 HOURS SCHEDULED
Status: DISCONTINUED | OUTPATIENT
Start: 2019-02-27 | End: 2019-02-28 | Stop reason: HOSPADM

## 2019-02-27 RX ORDER — NICOTINE POLACRILEX 4 MG
15 LOZENGE BUCCAL
Status: DISCONTINUED | OUTPATIENT
Start: 2019-02-27 | End: 2019-02-28 | Stop reason: HOSPADM

## 2019-02-27 RX ORDER — METOPROLOL SUCCINATE 100 MG/1
100 TABLET, EXTENDED RELEASE ORAL
Status: DISCONTINUED | OUTPATIENT
Start: 2019-02-27 | End: 2019-02-28 | Stop reason: HOSPADM

## 2019-02-27 RX ORDER — FLUMAZENIL 0.1 MG/ML
0.2 INJECTION INTRAVENOUS AS NEEDED
Status: DISCONTINUED | OUTPATIENT
Start: 2019-02-27 | End: 2019-02-27 | Stop reason: HOSPADM

## 2019-02-27 RX ORDER — DEXAMETHASONE SODIUM PHOSPHATE 10 MG/ML
INJECTION INTRAMUSCULAR; INTRAVENOUS AS NEEDED
Status: DISCONTINUED | OUTPATIENT
Start: 2019-02-27 | End: 2019-02-27 | Stop reason: SURG

## 2019-02-27 RX ORDER — ONDANSETRON 2 MG/ML
INJECTION INTRAMUSCULAR; INTRAVENOUS AS NEEDED
Status: DISCONTINUED | OUTPATIENT
Start: 2019-02-27 | End: 2019-02-27 | Stop reason: SURG

## 2019-02-27 RX ORDER — SODIUM CHLORIDE 0.9 % (FLUSH) 0.9 %
3-10 SYRINGE (ML) INJECTION AS NEEDED
Status: DISCONTINUED | OUTPATIENT
Start: 2019-02-27 | End: 2019-02-27 | Stop reason: HOSPADM

## 2019-02-27 RX ORDER — FAMOTIDINE 10 MG/ML
20 INJECTION, SOLUTION INTRAVENOUS
Status: DISCONTINUED | OUTPATIENT
Start: 2019-02-27 | End: 2019-02-27 | Stop reason: HOSPADM

## 2019-02-27 RX ORDER — NALOXONE HCL 0.4 MG/ML
0.2 VIAL (ML) INJECTION AS NEEDED
Status: DISCONTINUED | OUTPATIENT
Start: 2019-02-27 | End: 2019-02-27 | Stop reason: HOSPADM

## 2019-02-27 RX ORDER — DEXTROSE MONOHYDRATE 25 G/50ML
25 INJECTION, SOLUTION INTRAVENOUS
Status: DISCONTINUED | OUTPATIENT
Start: 2019-02-27 | End: 2019-02-28 | Stop reason: HOSPADM

## 2019-02-27 RX ORDER — EPHEDRINE SULFATE 50 MG/ML
5 INJECTION, SOLUTION INTRAVENOUS ONCE AS NEEDED
Status: DISCONTINUED | OUTPATIENT
Start: 2019-02-27 | End: 2019-02-27 | Stop reason: HOSPADM

## 2019-02-27 RX ORDER — ERGOCALCIFEROL (VITAMIN D2) 10 MCG
400 TABLET ORAL DAILY
COMMUNITY

## 2019-02-27 RX ORDER — CLINDAMYCIN PHOSPHATE 900 MG/50ML
900 INJECTION INTRAVENOUS ONCE
Status: COMPLETED | OUTPATIENT
Start: 2019-02-27 | End: 2019-02-27

## 2019-02-27 RX ORDER — ASPIRIN 81 MG/1
81 TABLET, CHEWABLE ORAL DAILY
Status: DISCONTINUED | OUTPATIENT
Start: 2019-02-27 | End: 2019-02-28 | Stop reason: HOSPADM

## 2019-02-27 RX ORDER — NIFEDIPINE 60 MG/1
60 TABLET, EXTENDED RELEASE ORAL NIGHTLY
Status: DISCONTINUED | OUTPATIENT
Start: 2019-02-27 | End: 2019-02-28 | Stop reason: HOSPADM

## 2019-02-27 RX ORDER — SODIUM CHLORIDE 0.9 % (FLUSH) 0.9 %
3 SYRINGE (ML) INJECTION EVERY 12 HOURS SCHEDULED
Status: DISCONTINUED | OUTPATIENT
Start: 2019-02-27 | End: 2019-02-27 | Stop reason: HOSPADM

## 2019-02-27 RX ORDER — GABAPENTIN 300 MG/1
300 CAPSULE ORAL 3 TIMES DAILY PRN
Status: DISCONTINUED | OUTPATIENT
Start: 2019-02-27 | End: 2019-02-28 | Stop reason: HOSPADM

## 2019-02-27 RX ORDER — DIPHENHYDRAMINE HCL 25 MG
25 CAPSULE ORAL
Status: DISCONTINUED | OUTPATIENT
Start: 2019-02-27 | End: 2019-02-27 | Stop reason: HOSPADM

## 2019-02-27 RX ORDER — PROPOFOL 10 MG/ML
VIAL (ML) INTRAVENOUS AS NEEDED
Status: DISCONTINUED | OUTPATIENT
Start: 2019-02-27 | End: 2019-02-27 | Stop reason: SURG

## 2019-02-27 RX ADMIN — EPHEDRINE SULFATE 10 MG: 50 INJECTION INTRAMUSCULAR; INTRAVENOUS; SUBCUTANEOUS at 08:51

## 2019-02-27 RX ADMIN — VASOPRESSIN 2 UNITS: 20 INJECTION INTRAMUSCULAR; SUBCUTANEOUS at 09:14

## 2019-02-27 RX ADMIN — MIDAZOLAM 1 MG: 1 INJECTION INTRAMUSCULAR; INTRAVENOUS at 07:08

## 2019-02-27 RX ADMIN — FENTANYL CITRATE 50 MCG: 50 INJECTION INTRAMUSCULAR; INTRAVENOUS at 10:01

## 2019-02-27 RX ADMIN — ONDANSETRON 4 MG: 2 INJECTION INTRAMUSCULAR; INTRAVENOUS at 09:34

## 2019-02-27 RX ADMIN — EPHEDRINE SULFATE 20 MG: 50 INJECTION INTRAMUSCULAR; INTRAVENOUS; SUBCUTANEOUS at 08:32

## 2019-02-27 RX ADMIN — KETOROLAC TROMETHAMINE 30 MG: 30 INJECTION, SOLUTION INTRAMUSCULAR; INTRAVENOUS at 09:34

## 2019-02-27 RX ADMIN — CLINDAMYCIN PHOSPHATE 900 MG: 900 INJECTION INTRAVENOUS at 08:14

## 2019-02-27 RX ADMIN — SODIUM CHLORIDE, POTASSIUM CHLORIDE, SODIUM LACTATE AND CALCIUM CHLORIDE 9 ML/HR: 600; 310; 30; 20 INJECTION, SOLUTION INTRAVENOUS at 06:48

## 2019-02-27 RX ADMIN — PHENYLEPHRINE HYDROCHLORIDE 200 MCG: 10 INJECTION INTRAVENOUS at 08:28

## 2019-02-27 RX ADMIN — PHENYLEPHRINE HYDROCHLORIDE 200 MCG: 10 INJECTION INTRAVENOUS at 08:30

## 2019-02-27 RX ADMIN — VASOPRESSIN 2 UNITS: 20 INJECTION INTRAMUSCULAR; SUBCUTANEOUS at 08:59

## 2019-02-27 RX ADMIN — PROPOFOL 200 MG: 10 INJECTION, EMULSION INTRAVENOUS at 08:19

## 2019-02-27 RX ADMIN — FENTANYL CITRATE 50 MCG: 50 INJECTION INTRAMUSCULAR; INTRAVENOUS at 08:19

## 2019-02-27 RX ADMIN — SODIUM CHLORIDE, POTASSIUM CHLORIDE, SODIUM LACTATE AND CALCIUM CHLORIDE: 600; 310; 30; 20 INJECTION, SOLUTION INTRAVENOUS at 07:19

## 2019-02-27 RX ADMIN — NIFEDIPINE 60 MG: 60 TABLET, FILM COATED, EXTENDED RELEASE ORAL at 21:36

## 2019-02-27 RX ADMIN — LATANOPROST 1 DROP: 50 SOLUTION OPHTHALMIC at 21:36

## 2019-02-27 RX ADMIN — ALLOPURINOL 100 MG: 100 TABLET ORAL at 21:36

## 2019-02-27 RX ADMIN — LIDOCAINE HYDROCHLORIDE 80 MG: 20 INJECTION, SOLUTION INFILTRATION; PERINEURAL at 08:19

## 2019-02-27 RX ADMIN — FENTANYL CITRATE 50 MCG: 50 INJECTION INTRAMUSCULAR; INTRAVENOUS at 09:59

## 2019-02-27 RX ADMIN — MIDAZOLAM 1 MG: 1 INJECTION INTRAMUSCULAR; INTRAVENOUS at 06:53

## 2019-02-27 RX ADMIN — GABAPENTIN 300 MG: 300 CAPSULE ORAL at 21:37

## 2019-02-27 RX ADMIN — Medication 2 MG: at 08:14

## 2019-02-27 RX ADMIN — ATORVASTATIN CALCIUM 10 MG: 10 TABLET, FILM COATED ORAL at 21:36

## 2019-02-27 RX ADMIN — SUCCINYLCHOLINE CHLORIDE 180 MG: 20 INJECTION, SOLUTION INTRAMUSCULAR; INTRAVENOUS; PARENTERAL at 08:19

## 2019-02-27 RX ADMIN — TAMSULOSIN HYDROCHLORIDE 0.8 MG: 0.4 CAPSULE ORAL at 21:36

## 2019-02-27 RX ADMIN — INSULIN LISPRO 3 UNITS: 100 INJECTION, SOLUTION INTRAVENOUS; SUBCUTANEOUS at 17:28

## 2019-02-27 RX ADMIN — ONDANSETRON 4 MG: 2 INJECTION INTRAMUSCULAR; INTRAVENOUS at 11:17

## 2019-02-27 RX ADMIN — FAMOTIDINE 20 MG: 10 INJECTION INTRAVENOUS at 06:52

## 2019-02-27 RX ADMIN — ROCURONIUM BROMIDE 10 MG: 10 INJECTION INTRAVENOUS at 08:19

## 2019-02-27 RX ADMIN — ASPIRIN 81 MG: 81 TABLET, CHEWABLE ORAL at 21:36

## 2019-02-27 RX ADMIN — SODIUM CHLORIDE, POTASSIUM CHLORIDE, SODIUM LACTATE AND CALCIUM CHLORIDE: 600; 310; 30; 20 INJECTION, SOLUTION INTRAVENOUS at 09:40

## 2019-02-27 RX ADMIN — DEXAMETHASONE SODIUM PHOSPHATE 10 MG: 10 INJECTION INTRAMUSCULAR; INTRAVENOUS at 08:45

## 2019-02-27 NOTE — ANESTHESIA PREPROCEDURE EVALUATION
Anesthesia Evaluation     Patient summary reviewed   history of anesthetic complications: difficult airway               Airway   Mallampati: II  Possible difficult intubation  Dental      Pulmonary    (+) sleep apnea,   Cardiovascular     ECG reviewed  Rhythm: regular    (+) hypertension, CAD,       Neuro/Psych  GI/Hepatic/Renal/Endo    (+) obesity,   diabetes mellitus,     Musculoskeletal     Abdominal    Substance History      OB/GYN          Other                        Anesthesia Plan    ASA 3     general     Anesthetic plan, all risks, benefits, and alternatives have been provided, discussed and informed consent has been obtained with: patient.  Use of blood products discussed with patient .

## 2019-02-27 NOTE — ANESTHESIA PROCEDURE NOTES
Airway  Urgency: elective    Date/Time: 2/27/2019 8:22 AM  Difficult airway    General Information and Staff    Patient location during procedure: OR  Anesthesiologist: Kana Cote MD  CRNA: Percy Toussaint CRNA    Indications and Patient Condition  Indications for airway management: airway protection    Preoxygenated: yes  MILS not maintained throughout  Mask difficulty assessment: 1 - vent by mask    Final Airway Details  Final airway type: endotracheal airway      Successful airway: ETT and reinforced tube  Cuffed: yes   Successful intubation technique: video laryngoscopy  Facilitating devices/methods: anterior pressure/BURP and Bougie  Endotracheal tube insertion site: oral  Blade: CMAC  Blade size: 3  ETT size (mm): 7.5  Cormack-Lehane Classification: grade III - view of epiglottis only  Placement verified by: chest auscultation   Measured from: lips  Number of attempts at approach: 2    Additional Comments  Pre O2, SIAI

## 2019-02-27 NOTE — ANESTHESIA POSTPROCEDURE EVALUATION
Patient: Will Garcia    Procedure Summary     Date:  02/27/19 Room / Location:  [facility] OR [address] [place] [address] MAIN OR    Anesthesia Start:  0811 Anesthesia Stop:  1001    Procedure:  L4-5 lumbar decompression (Left Spine Lumbar) Diagnosis:       Spinal stenosis, lumbar region, with neurogenic claudication      (Spinal stenosis, lumbar region, with neurogenic claudication [M48.062])    Surgeon:  Darío Chowdhury MD Provider:  Kana Cote MD    Anesthesia Type:  general ASA Status:  3          Anesthesia Type: general  Last vitals  BP   115/56 (02/27/19 1240)   Temp   36.6 °C (97.8 °F) (02/27/19 1000)   Pulse   66 (02/27/19 1240)   Resp   16 (02/27/19 1240)     SpO2   97 % (02/27/19 1240)     Post Anesthesia Care and Evaluation    Patient location during evaluation: PACU  Patient participation: complete - patient participated  Level of consciousness: awake  Pain management: adequate  Airway patency: patent  Anesthetic complications: No anesthetic complications    Cardiovascular status: acceptable  Respiratory status: acceptable  Hydration status: acceptable

## 2019-02-28 VITALS
SYSTOLIC BLOOD PRESSURE: 125 MMHG | BODY MASS INDEX: 42.33 KG/M2 | DIASTOLIC BLOOD PRESSURE: 74 MMHG | WEIGHT: 279.31 LBS | OXYGEN SATURATION: 96 % | HEART RATE: 81 BPM | RESPIRATION RATE: 16 BRPM | HEIGHT: 68 IN | TEMPERATURE: 96 F

## 2019-02-28 LAB — GLUCOSE BLDC GLUCOMTR-MCNC: 305 MG/DL (ref 70–130)

## 2019-02-28 PROCEDURE — 99024 POSTOP FOLLOW-UP VISIT: CPT | Performed by: NURSE PRACTITIONER

## 2019-02-28 PROCEDURE — 63710000001 INSULIN LISPRO (HUMAN) PER 5 UNITS: Performed by: NURSE PRACTITIONER

## 2019-02-28 PROCEDURE — 82962 GLUCOSE BLOOD TEST: CPT

## 2019-02-28 RX ORDER — CLOPIDOGREL BISULFATE 75 MG/1
75 TABLET ORAL DAILY
Qty: 30 TABLET | Refills: 0
Start: 2019-03-01 | End: 2019-06-04

## 2019-02-28 RX ADMIN — GABAPENTIN 300 MG: 300 CAPSULE ORAL at 07:58

## 2019-02-28 RX ADMIN — LOSARTAN POTASSIUM 100 MG: 100 TABLET, FILM COATED ORAL at 07:57

## 2019-02-28 RX ADMIN — SODIUM CHLORIDE, PRESERVATIVE FREE 3 ML: 5 INJECTION INTRAVENOUS at 07:58

## 2019-02-28 RX ADMIN — FUROSEMIDE 80 MG: 80 TABLET ORAL at 07:57

## 2019-02-28 RX ADMIN — ASPIRIN 81 MG: 81 TABLET, CHEWABLE ORAL at 07:58

## 2019-02-28 RX ADMIN — METOPROLOL SUCCINATE 100 MG: 100 TABLET, FILM COATED, EXTENDED RELEASE ORAL at 07:57

## 2019-02-28 RX ADMIN — INSULIN LISPRO 5 UNITS: 100 INJECTION, SOLUTION INTRAVENOUS; SUBCUTANEOUS at 07:56

## 2019-03-01 ENCOUNTER — TELEPHONE (OUTPATIENT)
Dept: NEUROSURGERY | Facility: CLINIC | Age: 74
End: 2019-03-01

## 2019-03-01 NOTE — TELEPHONE ENCOUNTER
Patient had surgery on 2/27 for L 4-5 lumbar decompression.  Pt was discharged by Manasa and would like to talk to her.  Pt is having a lot of pain in his back (where he had the surgery) and it is also in his buttocks. He said he did not have any pain while in the hospital.  The pain started last night.  He said it hurts to walk or even stand up. He knows he is not to sit down. Pt says he took his hydrocodone last night. He has been laying down. He said  it does not hurt when laying down.     Will [phone]

## 2019-03-01 NOTE — TELEPHONE ENCOUNTER
I saw the patient yesterday prior to discharge, and I explained to him that he was likely in somewhat of a honeymoon period regarding his back pain.  As Dr. Chowdhury explained to him prior to surgery (and the patient acknowledged this information), resolution of his back pain was unlikely.  There is a lot of local anesthetic that is given for the surgery that gives people relief of back pain for the postsurgical timeframe for the first 24 hours or so.  He told me yesterday that he has plenty of muscle relaxant at home.  I suggest that he use this, ice for 20-minute intervals as needed, and his pain medication.  He will just need to give it some time.  A lot of this is likely muscular.

## 2019-03-05 ENCOUNTER — TELEPHONE (OUTPATIENT)
Dept: NEUROSURGERY | Facility: CLINIC | Age: 74
End: 2019-03-05

## 2019-03-05 RX ORDER — METHOCARBAMOL 750 MG/1
750 TABLET, FILM COATED ORAL 4 TIMES DAILY PRN
Qty: 40 TABLET | Refills: 0 | Status: SHIPPED | OUTPATIENT
Start: 2019-03-05 | End: 2019-05-14

## 2019-03-05 NOTE — TELEPHONE ENCOUNTER
Yes. Done. He will have good and bad days. As long as he is seeing progressive improvement (even if slow), it is appropriate.

## 2019-03-05 NOTE — TELEPHONE ENCOUNTER
Pt had surgery on 2/27 by Dr Chowdhury forL4-5 lumbar decomp   Patient wants to know if we can prescribe him some muscle relaxers.  He has been taking his wile's Metaxalome  800 mg  3 x day  Every 8 hrs.   It has been helping more than the hydrocodone.      Pt said he was in terrible pain yesterday but he is doing better today.     Pt wants to know how long recovery takes.  nola Solis [phone]

## 2019-03-08 RX ORDER — HYDROCODONE BITARTRATE AND ACETAMINOPHEN 5; 325 MG/1; MG/1
1-2 TABLET ORAL EVERY 4 HOURS PRN
Qty: 15 TABLET | Refills: 0 | Status: SHIPPED | OUTPATIENT
Start: 2019-03-08 | End: 2019-05-14

## 2019-03-08 NOTE — TELEPHONE ENCOUNTER
Pt is requesting Rf on his Hydrocodone 5/325mg.  He has an appt with you on 3/13 for a HFU L4/5 lumbar decomp on 2/27.

## 2019-03-08 NOTE — TELEPHONE ENCOUNTER
Done. We have a form that needs to be filled out if someone other than patient is picking up rx. Ask DJ

## 2019-03-13 ENCOUNTER — TELEPHONE (OUTPATIENT)
Dept: CARDIOLOGY | Facility: CLINIC | Age: 74
End: 2019-03-13

## 2019-03-13 ENCOUNTER — OFFICE VISIT (OUTPATIENT)
Dept: NEUROSURGERY | Facility: CLINIC | Age: 74
End: 2019-03-13

## 2019-03-13 VITALS
HEIGHT: 68 IN | WEIGHT: 276.8 LBS | RESPIRATION RATE: 18 BRPM | SYSTOLIC BLOOD PRESSURE: 164 MMHG | DIASTOLIC BLOOD PRESSURE: 72 MMHG | HEART RATE: 77 BPM | TEMPERATURE: 97.8 F | BODY MASS INDEX: 41.95 KG/M2

## 2019-03-13 DIAGNOSIS — Z09 POSTOP CHECK: Primary | ICD-10-CM

## 2019-03-13 PROCEDURE — 99024 POSTOP FOLLOW-UP VISIT: CPT | Performed by: NURSE PRACTITIONER

## 2019-03-13 RX ORDER — PEN NEEDLE, DIABETIC 29 G X1/2"
NEEDLE, DISPOSABLE MISCELLANEOUS
COMMUNITY
Start: 2019-03-12

## 2019-03-13 NOTE — PROGRESS NOTES
" HPI:   Will Garcia is a 73 y.o. male for follow-up of lumbar spinal stenosis and epidural lipomatosis with radiculopathy.. He is status post L4/5 lumbar decompression on February 27, 2019. He was discharged to home. He has not had postoperative complications. He reports no leg pain (but he reports that he never had leg pain pre-surgery). He reports strength much better and no sense of numbness in his leg. His stamina is much better as well as discomfort discomfort with sitting. He reports no pain meds or muscle relaxants in past 3-4 days. Some dizziness and lightheadedness- better since off pain meds/muscle relaxants. No B/B issues. He does not take gabapentin occasionally.     He presents unaccompanied.    Review of Systems   Constitutional: Negative for chills and fever.   Musculoskeletal: Positive for back pain and gait problem.   Skin: Negative for rash. Wound: no drainage, or swelling.   Neurological: Positive for dizziness, weakness, light-headedness and numbness. Negative for headaches.   Psychiatric/Behavioral: Positive for sleep disturbance.        /72 (BP Location: Left arm, Patient Position: Sitting, Cuff Size: Large Adult)   Pulse 77   Temp 97.8 °F (36.6 °C)   Resp 18   Ht 172.7 cm (68\")   Wt 126 kg (276 lb 12.8 oz)   BMI 42.09 kg/m²     Physical Exam   Constitutional: He appears well-developed and well-nourished.   Pulmonary/Chest: Effort normal.   Neurological: He is alert. He has normal strength. Gait normal.   Reflex Scores:       Patellar reflexes are 2+ on the right side and 2+ on the left side.       Achilles reflexes are 0 on the right side and 0 on the left side.  Skin: Skin is warm and dry.   Midline lumbar incision well approximated with no redness, drainage, swelling   Psychiatric: He has a normal mood and affect. His behavior is normal. Judgment normal.   Vitals reviewed.    Neurologic Exam     Mental Status   Level of consciousness: alert  Knowledge: good.   Normal " comprehension.     Motor Exam     Strength   Strength 5/5 throughout.     Sensory Exam   Right leg light touch: normal  Left leg light touch: normal  Right leg vibration: normal  Left leg vibration: decreased from toes    Gait, Coordination, and Reflexes     Gait  Gait: normal    Reflexes   Right patellar: 2+  Left patellar: 2+  Right achilles: 0  Left achilles: 0  Able to heel and toe walk bilaterally       Findings/Results:  No new imaging    Assessment/Plan:  Will was seen today for post-op.    Diagnoses and all orders for this visit:    Postop check  -     Ambulatory Referral to Physical Therapy Evaluate and treat, POST OP; Full weight bearing      Discussion/Summary  Patient presents for first postoperative visit after 1 level lumbar decompression.  He reports improvement in leg sensation as well as strength.  He is ambulating around his house regularly.  No new complaints.  He is off pain medication.  He is pleased with outcome of the surgery thus far.  He states that he was a bit more sore than he anticipated to be after he got home.  His exam is as noted above.  Strength is intact.  Incision is healing well.  He has been given both verbal and written postoperative instructions today.  He would like to consider physical therapy.  He states that he was supposed to have cardiac rehab, but was unable to complete secondary to significant back issues.  I will have our staff clear his physical therapy orders with his cardiologist.  He should wait 2 weeks to begin physical therapy.  We will see him back in 2 months for clinical follow-up.    Plan: Return in about 2 months (around 5/13/2019) for Follow-up with Dr. Chowdhury.         Patient Care Team    Patient Care Team:  Dereck Lemus MD as PCP - General (Internal Medicine)  Zay Anderson MD as Consulting Physician (Cardiology)    Manasa Villalta, APRN  3/13/2019    Dragon disclaimer:   Much of this encounter note is an electronic  transcription/translation of spoken language to printed text. The electronic translation of spoken language may permit erroneous, or at times, nonsensical words or phrases to be inadvertently transcribed; Although I have reviewed the note for such errors, some may still exist.

## 2019-03-13 NOTE — TELEPHONE ENCOUNTER
[facility] would like to know if the pt can be cleared to start PT?    Oma# [phone]        Thanks,  Tea

## 2019-03-28 ENCOUNTER — TREATMENT (OUTPATIENT)
Dept: PHYSICAL THERAPY | Facility: CLINIC | Age: 74
End: 2019-03-28

## 2019-03-28 DIAGNOSIS — M54.40 ACUTE BILATERAL LOW BACK PAIN WITH SCIATICA, SCIATICA LATERALITY UNSPECIFIED: Primary | ICD-10-CM

## 2019-03-28 DIAGNOSIS — R26.2 DIFFICULTY WALKING: ICD-10-CM

## 2019-03-28 DIAGNOSIS — Z98.890 STATUS POST LUMBAR SPINE SURGERY FOR DECOMPRESSION OF SPINAL CORD: ICD-10-CM

## 2019-03-28 PROCEDURE — 97110 THERAPEUTIC EXERCISES: CPT | Performed by: PHYSICAL THERAPIST

## 2019-03-28 PROCEDURE — 97162 PT EVAL MOD COMPLEX 30 MIN: CPT | Performed by: PHYSICAL THERAPIST

## 2019-03-28 PROCEDURE — G0283 ELEC STIM OTHER THAN WOUND: HCPCS | Performed by: PHYSICAL THERAPIST

## 2019-03-28 NOTE — PROGRESS NOTES
Physical Therapy Initial Evaluation and Plan of Care    Patient: Will Garcia   : 1945  Diagnosis/ICD-10 Code:  Acute bilateral low back pain with sciatica, sciatica laterality unspecified [M54.40]  Referring practitioner: TOBIAS Subramanian  Past Medical History Reviewed: 3/28/2019    PLOF: Independent and lives with spouse    Subjective Evaluation    History of Present Illness  Date of surgery: 2019  Mechanism of injury: S/p lumbar decompression L4-5 on 2019. I have had back pain for 40 years from [employer] and it progressively got worse. When my left leg started giving out on me and it would tremble, I felt like it needed surgery. They saw that I had a heart issue as well prior to surgery and they put in a stent.   I brought up PT to the MD. I am trouble with mobility that is my major issue. I cannot turn my head. My balance is getting better and my left leg strength immediately returned after surgery.   I hurt all the time. No radiating pain into the left leg. I am not taking any pain meds.   No shortness of breath. I sleep really good. Back gee does not wake me up in the middle of the night.   I am getting to be able to climb the steps reciprocally   No knee pain , hip pain or ankle pain.         Patient Occupation: retired [employer]  Current pain rating: 3  At worst pain ratin  Location: central back pain  Relieving factors: ice  Aggravating factors: ambulation, stairs and standing  Progression: improved    Social Support  Lives in: multiple-level home  Lives with: spouse             Objective       Postural Observations  Seated posture: fair  Standing posture: fair        Palpation     Additional Palpation Details  Tenderness to B paraspinals and tightness    Neurological Testing     Sensation     Lumbar   Left   Intact: light touch    Right   Intact: light touch    Active Range of Motion     Additional Active Range of Motion Details  Cervical rotation: approximately 30 degrees  B    Ambulation     Observational Gait   Gait: antalgic   Decreased walking speed.   Base of support: increased    Functional Assessment     Single Leg Stance   Left: 2 seconds  Right: 2 seconds    Comments  Pt had difficulty transferring on and off table and reported significant pain with transfers         Assessment & Plan     Assessment  Impairments: abnormal or restricted ROM, activity intolerance, impaired physical strength, lacks appropriate home exercise program and pain with function  Assessment details: Pt presents to PT with symptoms consistent with lumbar tightness and pain as a result of lumbar surgery.  Pt would benefit from skilled PT intervention to address the deficits noted.     Prognosis: good  Functional Limitations: lifting, walking, uncomfortable because of pain, moving in bed, sitting and unable to perform repetitive tasks  Goals  Plan Goals: SHORT TERM GOALS: Time for Goal Achievement: 4 weeks    1.  Patient to be compliant and progression of HEP                             2.  Pain level < 5/10 at worst with mentioned activities to improve function  3.  Increased thoracic, lumbar and SIJ mobility to allow for increased lumbar AROM with less pain.  4.  Increased lumbar AROM to by 25% in all planes to allow for increased ease with sit-stand transfers and functional activities    LONG TERM GOALS: Time for Goal Achievement: 2 months  1.  Pt. to score < 30 % on Back Index  2.  Pain level < 3/10 with all listed activities to return to normal.  3.  Lumbar AROM to WFL to allow for return to household & recreational activities w/o increased symptoms  4.  (B) LE and lower abdominal strength to 5/5 to allow for pushing, pulling and activities to occur without pain (driving, sitting, household  & Job requirements)        Plan  Therapy options: will be seen for skilled physical therapy services  Planned modality interventions: cryotherapy, electrical stimulation/Russian stimulation, TENS and thermotherapy  (hydrocollator packs)  Other planned modality interventions: Dry Needling  Planned therapy interventions: body mechanics training, flexibility, functional ROM exercises, home exercise program, manual therapy, neuromuscular re-education, postural training, spinal/joint mobilization, stretching, strengthening and soft tissue mobilization  Duration in visits: 12  Treatment plan discussed with: patient        Manual Therapy:    -     mins  79887;  Therapeutic Exercise:    8     mins  62346;     Neuromuscular Maribel:    -    mins  12529;    Therapeutic Activity:     -     mins  99308;     Gait Training:      -     mins  17782;     Ultrasound:     -     mins  43044;    Electrical Stimulation:    15     mins  48338 ( );  Dry Needling     -     mins self-pay    Timed Treatment:   8   mins   Total Treatment:     70   mins      PT SIGNATURE: Suzanne Beckett, PT   DATE TREATMENT INITIATED: 3/28/2019    Medicare Initial Certification  Certification Period: 6/26/2019  I certify that the therapy services are furnished while this patient is under my care.  The services outlined above are required by this patient, and will be reviewed every 90 days.     PHYSICIAN: Manasa Villalta, APRN      DATE:     Please sign and return via fax to [phone].. Thank you, [facility].

## 2019-04-01 ENCOUNTER — TREATMENT (OUTPATIENT)
Dept: PHYSICAL THERAPY | Facility: CLINIC | Age: 74
End: 2019-04-01

## 2019-04-01 DIAGNOSIS — Z98.890 STATUS POST LUMBAR SPINE SURGERY FOR DECOMPRESSION OF SPINAL CORD: ICD-10-CM

## 2019-04-01 DIAGNOSIS — M54.40 ACUTE BILATERAL LOW BACK PAIN WITH SCIATICA, SCIATICA LATERALITY UNSPECIFIED: Primary | ICD-10-CM

## 2019-04-01 DIAGNOSIS — R26.2 DIFFICULTY WALKING: ICD-10-CM

## 2019-04-01 PROCEDURE — 97110 THERAPEUTIC EXERCISES: CPT | Performed by: PHYSICAL THERAPIST

## 2019-04-01 PROCEDURE — 97014 ELECTRIC STIMULATION THERAPY: CPT | Performed by: PHYSICAL THERAPIST

## 2019-04-01 PROCEDURE — 97112 NEUROMUSCULAR REEDUCATION: CPT | Performed by: PHYSICAL THERAPIST

## 2019-04-01 NOTE — PROGRESS NOTES
Physical Therapy Daily Progress Note  Visit: 2    Will Garcia reports: I was sore after the evaluation. Not noticed much change since then    Subjective     Objective   See Exercise, Manual, and Modality Logs for complete treatment.       Assessment & Plan     Assessment  Assessment details: Pt had difficulty transferring on and off table due to body mechanics and pain. Educated on log roll technique to protect his back. Added TA isometric and pelvic tilt to HEP    Plan  Plan details: Begin lateral step outs and rockerboard next session        Manual Therapy:    -     mins  57140;  Therapeutic Exercise:    31     mins  74490;     Neuromuscular Maribel:    10    mins  10854;    Therapeutic Activity:     -     mins  93195;     Gait Training:      -     mins  11751;     Ultrasound:     -     mins  86526;    Electrical Stimulation:    15     mins  55914 ( );  Dry Needling     -     mins self-pay    Timed Treatment:   41   mins   Total Treatment:     60   mins    Suzanne Beckett PT  [place] License #: [id number]    Physical Therapist

## 2019-04-03 ENCOUNTER — TREATMENT (OUTPATIENT)
Dept: PHYSICAL THERAPY | Facility: CLINIC | Age: 74
End: 2019-04-03

## 2019-04-03 DIAGNOSIS — Z98.890 STATUS POST LUMBAR SPINE SURGERY FOR DECOMPRESSION OF SPINAL CORD: ICD-10-CM

## 2019-04-03 DIAGNOSIS — M54.40 ACUTE BILATERAL LOW BACK PAIN WITH SCIATICA, SCIATICA LATERALITY UNSPECIFIED: Primary | ICD-10-CM

## 2019-04-03 DIAGNOSIS — R26.2 DIFFICULTY WALKING: ICD-10-CM

## 2019-04-03 PROCEDURE — G0283 ELEC STIM OTHER THAN WOUND: HCPCS | Performed by: PHYSICAL THERAPIST

## 2019-04-03 PROCEDURE — 97140 MANUAL THERAPY 1/> REGIONS: CPT | Performed by: PHYSICAL THERAPIST

## 2019-04-03 PROCEDURE — 97110 THERAPEUTIC EXERCISES: CPT | Performed by: PHYSICAL THERAPIST

## 2019-04-03 NOTE — PROGRESS NOTES
Physical Therapy Daily Progress Note  Visit: 3    Will Gracia reports: Today is a really bad day. I might have to resort into pain meds    Subjective     Objective   See Exercise, Manual, and Modality Logs for complete treatment.       Assessment & Plan     Assessment  Assessment details: Pt had quite a bit of difficulty and pain with transfers today. Performed gentle STM and table exercises for stability and then educated to ice at home. Educated him that it is ok to take something for pain and I would like to see his pain levels decrease    Plan  Plan details: Progress as able        Manual Therapy:    10     mins  87903;  Therapeutic Exercise:    25     mins  09731;     Neuromuscular Maribel:    -    mins  32492;    Therapeutic Activity:     -     mins  72135;     Gait Training:      -     mins  84484;     Ultrasound:     -     mins  52638;    Electrical Stimulation:    15     mins  91794 ( );  Dry Needling     -     mins self-pay    Timed Treatment:   35   mins   Total Treatment:     55   mins    Suzanne Beckett PT  [place] License #: [id number]    Physical Therapist

## 2019-04-08 ENCOUNTER — TREATMENT (OUTPATIENT)
Dept: PHYSICAL THERAPY | Facility: CLINIC | Age: 74
End: 2019-04-08

## 2019-04-08 DIAGNOSIS — R26.2 DIFFICULTY WALKING: ICD-10-CM

## 2019-04-08 DIAGNOSIS — Z98.890 STATUS POST LUMBAR SPINE SURGERY FOR DECOMPRESSION OF SPINAL CORD: ICD-10-CM

## 2019-04-08 DIAGNOSIS — M54.40 ACUTE BILATERAL LOW BACK PAIN WITH SCIATICA, SCIATICA LATERALITY UNSPECIFIED: Primary | ICD-10-CM

## 2019-04-08 PROCEDURE — 97110 THERAPEUTIC EXERCISES: CPT | Performed by: PHYSICAL THERAPIST

## 2019-04-08 PROCEDURE — 97140 MANUAL THERAPY 1/> REGIONS: CPT | Performed by: PHYSICAL THERAPIST

## 2019-04-08 PROCEDURE — 97014 ELECTRIC STIMULATION THERAPY: CPT | Performed by: PHYSICAL THERAPIST

## 2019-04-08 NOTE — PROGRESS NOTES
Physical Therapy Daily Progress Note  Visit: 4    Will Garcia reports: I am feeling better today    Subjective     Objective   See Exercise, Manual, and Modality Logs for complete treatment.       Assessment & Plan     Assessment  Assessment details: Pt doing better today. His pain is better managed, but continues to have difficulty with rolling and transferring on table    Plan  Plan details: Progress per POC        Manual Therapy:    10     mins  30477;  Therapeutic Exercise:    32     mins  74927;     Neuromuscular Maribel:    -    mins  46603;    Therapeutic Activity:     -     mins  18316;     Gait Training:      -     mins  66475;     Ultrasound:     -     mins  98383;    Electrical Stimulation:    15     mins  33869 ( );  Dry Needling     -     mins self-pay    Timed Treatment:   42   mins   Total Treatment:     60   mins    Suzanne Beckett PT  [place] License #: [id number]    Physical Therapist

## 2019-04-17 ENCOUNTER — TREATMENT (OUTPATIENT)
Dept: PHYSICAL THERAPY | Facility: CLINIC | Age: 74
End: 2019-04-17

## 2019-04-17 DIAGNOSIS — M54.40 ACUTE BILATERAL LOW BACK PAIN WITH SCIATICA, SCIATICA LATERALITY UNSPECIFIED: Primary | ICD-10-CM

## 2019-04-17 DIAGNOSIS — R26.2 DIFFICULTY WALKING: ICD-10-CM

## 2019-04-17 DIAGNOSIS — Z98.890 STATUS POST LUMBAR SPINE SURGERY FOR DECOMPRESSION OF SPINAL CORD: ICD-10-CM

## 2019-04-17 PROCEDURE — 97014 ELECTRIC STIMULATION THERAPY: CPT | Performed by: PHYSICAL THERAPIST

## 2019-04-17 PROCEDURE — 97140 MANUAL THERAPY 1/> REGIONS: CPT | Performed by: PHYSICAL THERAPIST

## 2019-04-17 PROCEDURE — 97110 THERAPEUTIC EXERCISES: CPT | Performed by: PHYSICAL THERAPIST

## 2019-04-17 NOTE — PROGRESS NOTES
Physical Therapy Daily Progress Note  Visit: 5    Will Garcia reports: I am doing pretty good. I am still sore though    Subjective     Objective   See Exercise, Manual, and Modality Logs for complete treatment.       Assessment & Plan     Assessment  Assessment details: Pt reported increased back pain after standing exercises. Reported relief with seated distraction. Will continue to work on stabilization and core strength gently    Plan  Plan details: Progress per POC        Manual Therapy:    8     mins  96012;  Therapeutic Exercise:    31     mins  51208;     Neuromuscular Maribel:    -    mins  44942;    Therapeutic Activity:     -     mins  78256;     Gait Training:      -     mins  53037;     Ultrasound:     -     mins  23375;    Electrical Stimulation:    15     mins  58566 ( );  Dry Needling     -     mins self-pay    Timed Treatment:   39   mins   Total Treatment:     60   mins    Suzanne Beckett PT  [place] License #: [id number]    Physical Therapist

## 2019-05-14 ENCOUNTER — OFFICE VISIT (OUTPATIENT)
Dept: NEUROSURGERY | Facility: CLINIC | Age: 74
End: 2019-05-14

## 2019-05-14 VITALS
DIASTOLIC BLOOD PRESSURE: 62 MMHG | RESPIRATION RATE: 16 BRPM | HEIGHT: 68 IN | SYSTOLIC BLOOD PRESSURE: 130 MMHG | WEIGHT: 277 LBS | HEART RATE: 64 BPM | BODY MASS INDEX: 41.98 KG/M2

## 2019-05-14 DIAGNOSIS — M48.062 SPINAL STENOSIS, LUMBAR REGION, WITH NEUROGENIC CLAUDICATION: Primary | ICD-10-CM

## 2019-05-14 PROCEDURE — 99024 POSTOP FOLLOW-UP VISIT: CPT | Performed by: NEUROLOGICAL SURGERY

## 2019-05-14 NOTE — PROGRESS NOTES
"Subjective   Patient ID: Will Garcia is a 73 y.o. male is here today for follow-up of PO2 L4/5 LUMBAR DECOMP 2/27 .  Pt is unaccompanied.    History of Present Illness    He returns to the office today for second postop visit status post L4-5 lumbar decompression on February 27, 2019.  Surgery was performed secondary to severe lumbar spinal stenosis with epidural lipomatosis and radiculopathy.    Today, he continues to report complete resolution of leg pain and weakness.  He continues to report some chronic low back pain but overall this is also improved postop.  His gait is much more stable and upright.  He denies any ongoing foot drop.  He is very pleased with his postoperative status.  He remains on Plavix and aspirin following drug-eluting stent that was placed secondary to severe coronary artery disease during preoperative cardiac clearance.  He denies any new problems.    He presents unaccompanied.    /62 (BP Location: Left arm, Patient Position: Sitting, Cuff Size: Large Adult)   Pulse 64   Resp 16   Ht 172.7 cm (68\")   Wt 126 kg (277 lb)   BMI 42.12 kg/m²     The following portions of the patient's history were reviewed and updated as appropriate: allergies, current medications, past family history, past medical history, past social history, past surgical history and problem list.    Review of Systems   Genitourinary: Positive for difficulty urinating (BPH). Negative for enuresis.   Musculoskeletal: Positive for back pain and gait problem.   Neurological: Negative for weakness and numbness.   Psychiatric/Behavioral: Negative for sleep disturbance.       Objective   Physical Exam   Constitutional: He is oriented to person, place, and time. He appears well-developed and well-nourished. He is cooperative.  Non-toxic appearance. He does not have a sickly appearance. He does not appear ill.   Very pleasant, obese older male   HENT:   Head: Normocephalic and atraumatic.   Eyes:   glasses   Neck: Neck " supple.   Pulmonary/Chest: Effort normal.   Musculoskeletal: Normal range of motion. He exhibits no tenderness or deformity.   WATSON well  Strength equal and intact BLE   Neurological: He is alert and oriented to person, place, and time. He has normal strength. He displays no tremor and normal reflexes. No cranial nerve deficit. Coordination and gait normal. GCS eye subscore is 4. GCS verbal subscore is 5. GCS motor subscore is 6.   Reflex Scores:       Patellar reflexes are 2+ on the right side and 2+ on the left side.       Achilles reflexes are 2+ on the right side and 2+ on the left side.  Stable upright gait, able to heel and toe walk, deferred tandem, nonantalgic     Skin: Skin is warm and dry.   Psychiatric: He has a normal mood and affect. His behavior is normal. Thought content normal.   Vitals reviewed.    Neurologic Exam     Mental Status   Oriented to person, place, and time.     Motor Exam     Strength   Strength 5/5 throughout.     Gait, Coordination, and Reflexes     Reflexes   Right patellar: 2+  Left patellar: 2+  Right achilles: 2+  Left achilles: 2+      Assessment/Plan   Independent Review of Radiographic Studies:    No new imaging      Medical Decision Making:    What a remarkably good result.  Clinically he is doing remarkably well.  No further falls.  His strength is improved.    I can think of no particular reason for him to return to the office unless he simply wants to return and say hi!    Will was seen today for po2 l4/5 lumbar decomp 2/27.    Diagnoses and all orders for this visit:    Spinal stenosis, lumbar region, with neurogenic claudication      Return if symptoms worsen or fail to improve.

## 2019-06-03 ENCOUNTER — TELEPHONE (OUTPATIENT)
Dept: CARDIOLOGY | Facility: CLINIC | Age: 74
End: 2019-06-03

## 2019-06-03 NOTE — TELEPHONE ENCOUNTER
Pt called because [facility] went to [facility] and they were discussing how long he would need to be on his Clopidogrel. So he is wanting to find that out. He would also like to know if he can take Ibuprofen for back pain.........Anne

## 2019-06-03 NOTE — TELEPHONE ENCOUNTER
I am ok with stopping the clopidogrel 1 year after his stent placement but I will defer the final decision to Dr. Anderson who is his cardiologist.  Will also defer the question about ibuprofen to him as well.

## 2019-06-04 NOTE — TELEPHONE ENCOUNTER
Dr. Barney,    Mr. Jose wanted me to express his gratitude for the surgery you did on him.  He just couldn't say enough wonderful things about you!  He spoke about how pleasant, thoughtful and knowledgeable you are.  He was most impressed!    Have a great day!    Tea

## 2019-11-19 ENCOUNTER — HOSPITAL ENCOUNTER (OUTPATIENT)
Dept: GENERAL RADIOLOGY | Facility: HOSPITAL | Age: 74
Discharge: HOME | End: 2019-11-19
Admitting: INTERNAL MEDICINE

## 2019-11-19 DIAGNOSIS — M79.641 RIGHT HAND PAIN: ICD-10-CM

## 2019-11-19 PROCEDURE — 73130 X-RAY EXAM OF HAND: CPT

## 2020-01-13 NOTE — ANESTHESIA PREPROCEDURE EVALUATION
Anesthesia Evaluation     Patient summary reviewed and Nursing notes reviewed   history of anesthetic complications: difficult airway  NPO Solid Status: > 8 hours  NPO Liquid Status: > 8 hours           Airway   Mallampati: III  TM distance: <3 FB  Neck ROM: limited  Difficult intubation highly probable and Small opening  Comment: By Hx a difficult intubation  Dental - normal exam     Pulmonary    (+) sleep apnea on CPAP, decreased breath sounds,   Cardiovascular - normal exam    Patient on routine beta blocker    (+) hypertension (three meds),       Neuro/Psych  GI/Hepatic/Renal/Endo    (+) morbid obesity,  diabetes mellitus type 2 using insulin,     Musculoskeletal     Abdominal   (+) obese,    Substance History      OB/GYN          Other                      Anesthesia Plan    ASA 3     MAC     Anesthetic plan and risks discussed with patient.       Flap Type: O-T Advancement Flap

## 2020-08-24 ENCOUNTER — OFFICE VISIT (OUTPATIENT)
Dept: SLEEP MEDICINE | Facility: HOSPITAL | Age: 75
End: 2020-08-24

## 2020-08-24 VITALS
DIASTOLIC BLOOD PRESSURE: 69 MMHG | SYSTOLIC BLOOD PRESSURE: 144 MMHG | HEART RATE: 64 BPM | OXYGEN SATURATION: 97 % | HEIGHT: 68 IN | BODY MASS INDEX: 40.77 KG/M2 | WEIGHT: 269 LBS

## 2020-08-24 DIAGNOSIS — Z99.89 OSA ON CPAP: Primary | ICD-10-CM

## 2020-08-24 DIAGNOSIS — G47.33 OSA ON CPAP: Primary | ICD-10-CM

## 2020-08-24 PROCEDURE — G0463 HOSPITAL OUTPT CLINIC VISIT: HCPCS

## 2020-08-24 NOTE — PROGRESS NOTES
CONSULT NOTE    Patient Identification:  Will Garcia  74 y.o.  male  1945  [id number]            Requesting physician: Dr. Lemus    Reason for Consultation: HELEN    CC:     History of Present Illness:  Very pleasant 74-year-old gentleman he tells me that he has been here several years ago.  Unfortunately because of the change in epic we do not have his old sleep study on his last office visit.  He is currently on auto CPAP 11 to 15 cm with compliance from his card shows 100% average daily use 9 hours and 8 minutes with AHI and leak both excellent.  His current CPAP is extremely old and would like to have a new CPAP machine.  Feels rested with his current CPAP.  No parasomnias no restless leg symptoms no heavy alcohol use was reported.  He goes to bed 12 PM gets up 10 AM gets about 9 to 10 hours of sleep and feels rested.  No night shift work.      Review of Systems  12 point review of system completely negative  [place] 2 out of 24 within normal limits  Past Medical History:  Past Medical History:   Diagnosis Date   • Arthritis    • Cancer (CMS/HCC)    • Cataract    • Chronic kidney disease    • Chronic pain disorder    • Coronary artery disease    • Enlarged prostate    • Essential hypertension    • Hard to intubate    • History of kidney stones    • Injury of back    • Low back pain    • Lumbosacral disc disease    • Lymph edema     BLE   • Neuropathy     BLE   • HELEN (obstructive sleep apnea)     CPAP   • Osteoporosis    • Sleep apnea     CPAP   • Spinal stenosis    • Type 2 diabetes mellitus with diabetic neuropathy, with long-term current use of insulin (CMS/[facility])        Past Surgical History:  Past Surgical History:   Procedure Laterality Date   • CARDIAC CATHETERIZATION Left 5/17/2018    Procedure: Cardiac Catheterization/Vascular Study;  Surgeon: Kaitlin Barney MD;  Location: [facility] INVASIVE LOCATION;  Service: Cardiovascular   • CARDIAC CATHETERIZATION N/A 5/17/2018    Procedure: Stent LACEY  "coronary;  Surgeon: Kaitlin Barney MD;  Location: [facility] CATH INVASIVE LOCATION;  Service: Cardiovascular   • CATARACT EXTRACTION     • COLONOSCOPY N/A 4/3/2018    Procedure: COLONOSCOPY TO CECUM;  Surgeon: Royal Metcalf MD;  Location: [facility] ENDOSCOPY;  Service: General   • EPIDURAL BLOCK     • KIDNEY STONE SURGERY     • LUMBAR DISCECTOMY Left 2/27/2019    Procedure: L4-5 lumbar decompression;  Surgeon: Darío Chowdhury MD;  Location: [facility] MAIN OR;  Service: Neurosurgery   • SHOULDER ARTHROSCOPY Left    • TONSILLECTOMY          Home Meds:    (Not in a hospital admission)    Allergies:  Allergies   Allergen Reactions   • Ceclor [Cefaclor] Swelling       Social History:   Social History     Socioeconomic History   • Marital status:      Spouse name: Melanie   • Number of children: 2   • Years of education: 21   • Highest education level: Not on file   Occupational History   • 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 [employer]   Tobacco Use   • Smoking status: Never Smoker   • Smokeless tobacco: Never Used   Substance and Sexual Activity   • Alcohol use: No     Frequency: Never   • Drug use: No   • Sexual activity: Defer       Family History:  Family History   Problem Relation Age of Onset   • Diabetes Other    • Stroke Other    • Heart disease Father    • Diabetes Maternal Grandmother    • Diabetes Maternal Grandfather    • No Known Problems Paternal Grandmother    • No Known Problems Paternal Grandfather    • Malig Hyperthermia Neg Hx        Physical Exam:  /69   Pulse 64   Ht 172.7 cm (68\")   Wt 122 kg (269 lb)   SpO2 97%   BMI 40.90 kg/m²  Body mass index is 40.9 kg/m². 97% 122 kg (269 lb)  Physical Exam  Patient is examined using the personal protective equipment as per guidelines from infection control for this particular patient as enacted.  Hand hygiene was performed before and after patient interaction.  Well-developed normal body " habitus  Eyes normal conjunctivae and pupils reactive to light  ENT Mallampati between 3 and 4 normal nasal exam  Neck midline trachea no thyromegaly  Chest no labored breathing clear  CVS regular rate and rhythm no lower extremity edema  Abdomen soft nontender hepatosplenomegaly  CNS intact normal sensory exam  Skin no rashes no nodules  Psych oriented to time place and person normal memory  Musculoskeletal no cyanosis no clubbing normal range of motion        LABS:  Lab Results   Component Value Date    CALCIUM 9.4 02/27/2019       No results found for: CKTOTAL, CKMB, CKMBINDEX, TROPONINI, TROPONINT                              No results found for: TSH  CrCl cannot be calculated (Patient's most recent lab result is older than the maximum 30 days allowed.).         Imaging: I personally visualized the images of scans/x-rays performed within last 3 days.      Assessment:  HELEN on CPAP  Morbid obesity  Diabetes mellitus  Hypertension      Recommendations:  At this point we have a gentleman known history of HELEN currently on auto CPAP 11- 15 cm.  Compliance download looks excellent.  Will replace with the same pressure new CPAP.  Adequate compliance with positive benefit noted from CPAP auto.  He is religiously using his auto CPAP well.  Currently there is no need to repeat another sleep study  Patient needs to continue losing weight and sleep hygiene measures  Treatment of underlying comorbidities  Weight loss encouraged  Sleep hygiene measures  Follow-up in 6 to 8 weeks for compliance download          Deisy Kee MD  8/24/2020  14:23      Much of this encounter note is an electronic transcription/translation of spoken language to printed text using Dragon Software.

## 2020-10-05 ENCOUNTER — TRANSCRIBE ORDERS (OUTPATIENT)
Dept: ADMINISTRATIVE | Facility: HOSPITAL | Age: 75
End: 2020-10-05

## 2020-10-05 DIAGNOSIS — I25.10 DISEASE OF CARDIOVASCULAR SYSTEM: Primary | ICD-10-CM

## 2020-10-16 ENCOUNTER — APPOINTMENT (OUTPATIENT)
Dept: CARDIOLOGY | Facility: HOSPITAL | Age: 75
End: 2020-10-16

## 2021-03-09 DIAGNOSIS — Z23 IMMUNIZATION DUE: ICD-10-CM

## 2021-12-01 ENCOUNTER — TELEPHONE (OUTPATIENT)
Dept: SLEEP MEDICINE | Facility: HOSPITAL | Age: 76
End: 2021-12-01

## 2021-12-01 NOTE — TELEPHONE ENCOUNTER
Patient called wanting order for resupply  , he had not been seen since 2020, he did not wish to come in . I suggested telehealth @ [facility] since he is Dr Lam patient. He agreed , sent demographics for LPC to scheduled patient

## 2022-04-07 ENCOUNTER — OFFICE VISIT (OUTPATIENT)
Dept: SLEEP MEDICINE | Facility: HOSPITAL | Age: 77
End: 2022-04-07

## 2022-04-07 VITALS — OXYGEN SATURATION: 97 % | WEIGHT: 282 LBS | BODY MASS INDEX: 42.74 KG/M2 | HEART RATE: 75 BPM | HEIGHT: 68 IN

## 2022-04-07 DIAGNOSIS — G47.33 OBSTRUCTIVE SLEEP APNEA: Primary | ICD-10-CM

## 2022-04-07 DIAGNOSIS — E66.01 CLASS 3 SEVERE OBESITY DUE TO EXCESS CALORIES WITH SERIOUS COMORBIDITY AND BODY MASS INDEX (BMI) OF 40.0 TO 44.9 IN ADULT: ICD-10-CM

## 2022-04-07 PROCEDURE — G0463 HOSPITAL OUTPT CLINIC VISIT: HCPCS

## 2022-04-07 PROCEDURE — 99203 OFFICE O/P NEW LOW 30 MIN: CPT | Performed by: INTERNAL MEDICINE

## 2022-04-07 NOTE — PROGRESS NOTES
Sleep Disorders Center New Patient/Consultation       Reason for Consultation: HELEN    Patient Care Team:  Dereck Lemus MD as PCP - General (Internal Medicine)  Zay Anderson MD as Consulting Physician (Cardiology)  Claude Yeager MD as Consulting Physician (Sleep Medicine)    Chief complaint: HELEN    History of present illness:    Thank you for asking me to see your patient.  The patient is a 76 y.o. male who has previously diagnosed obstructive sleep apnea presently treated with ResMed auto CPAP with nasal pillows.  I am seeing the patient's wife and the patient wished to be seen with her.  The patient states he is doing well with no complaints.  He goes to bed at midnight and awakens at 9 AM.  He wakes up to adjust the mask.  [place] Sleepiness Scale is normal at 2.    Review of Systems:    A complete review of systems was done and all were negative with the exception of some shortness of breath with exertion    History:  Past Medical History:   Diagnosis Date   • Arthritis    • Cancer (HCC)    • Cataract    • Chronic kidney disease    • Chronic pain disorder    • Coronary artery disease    • Enlarged prostate    • Essential hypertension    • Hard to intubate    • History of kidney stones    • Injury of back    • Low back pain    • Lumbosacral disc disease    • Lymph edema     BLE   • Neuropathy     BLE   • HELEN (obstructive sleep apnea) treated with auto CPAP 04/02/2010    Overnight polysomnogram.  Weight 254 pounds.  Severe HELEN with AHI 41.8 events per hour.  Sleep-related hypoxia also present.  Auto CPAP prescribed.   • Osteoporosis    • Spinal stenosis    • Type 2 diabetes mellitus with diabetic neuropathy, with long-term current use of insulin (HCC)    ,   Past Surgical History:   Procedure Laterality Date   • CARDIAC CATHETERIZATION Left 5/17/2018    Procedure: Cardiac Catheterization/Vascular Study;  Surgeon: Kaitlin Barney MD;  Location: [facility] INVASIVE LOCATION;  Service:  "Cardiovascular   • CARDIAC CATHETERIZATION N/A 5/17/2018    Procedure: Stent LACEY coronary;  Surgeon: Kaitlin Barney MD;  Location: [facility] CATH INVASIVE LOCATION;  Service: Cardiovascular   • CATARACT EXTRACTION     • COLONOSCOPY N/A 4/3/2018    Procedure: COLONOSCOPY TO CECUM;  Surgeon: Royal Metcalf MD;  Location: [facility] ENDOSCOPY;  Service: General   • EPIDURAL BLOCK     • KIDNEY STONE SURGERY     • LUMBAR DISCECTOMY Left 2/27/2019    Procedure: L4-5 lumbar decompression;  Surgeon: Darío Chowdhury MD;  Location: [facility] MAIN OR;  Service: Neurosurgery   • SHOULDER ARTHROSCOPY Left    • TONSILLECTOMY     ,   Family History   Problem Relation Age of Onset   • Diabetes Other    • Stroke Other    • Heart disease Father    • Diabetes Maternal Grandmother    • Diabetes Maternal Grandfather    • No Known Problems Paternal Grandmother    • No Known Problems Paternal Grandfather    • Malig Hyperthermia Neg Hx     and   Social History     Socioeconomic History   • Marital status:      Spouse name: Melanie   • Number of children: 2   • Years of education: 21   Tobacco Use   • Smoking status: Never Smoker   • Smokeless tobacco: Never Used   Substance and Sexual Activity   • Alcohol use: No   • Drug use: No   • Sexual activity: Defer     E-cigarette/Vaping     E-cigarette/Vaping Substances     E-cigarette/Vaping Devices        Social History: 1 caffeinated beverage a day    Allergies:  Ceclor [cefaclor]     Medication Review: His list was reviewed    Vital Signs:    Vitals:    04/07/22 1100   Pulse: 75   SpO2: 97%   Weight: 128 kg (282 lb)   Height: 172.7 cm (68\")      Body mass index is 42.88 kg/m².         Physical Exam:    Constitutional:  Well developed 76 y.o. male that appears in no apparent distress.  Awake & oriented times 3.  Normal mood with normal recent and remote memory and normal judgement.  Eyes:  Conjunctivae normal.  Oropharynx: Moist mucous membranes without exudate and a large tongue and class III " Mallampati airway.  Patient wearing a facemask.  Neck: Trachea midline  Respiratory: Effort is not labored  Cardiovascular: Radial pulse regular  Musculoskeletal: Gait appears normal, no digital clubbing evident, no pre-tibial edema, patient wearing support hose    Results Review: Downloads between 1/5 and 4/4/2022 reviewed.  Compliance 100% and average usage 9 hours and 11 minutes.  Average AHI is normal without leak.  Average auto CPAP pressure is 14.5 and his auto CPAP is 11-16.    Impression:   Severe obstructive sleep apnea with sleep-related hypoxia by overnight polysomnogram 4/2/2010, weight 254 pounds, adequately treated with ResMed auto CPAP.  The patient is at goal with good compliance and usage.  The patient has no complaints of hypersomnolence.    Plan:  Good sleep hygiene measures should be maintained.  Weight loss would be beneficial in this patient who is morbid obesity (Body mass index is 42.88 kg/m².)    After evaluating the patient and assessing results available, the patient is benefiting from the treatment being provided.     Pathophysiology of HELEN briefly described to the patient.  Cardiovascular complications of untreated HELEN also briefly reviewed.      The patient will continue ResMed auto CPAP with nasal pillows.  A new prescription will be sent to his DME.  The patient is doing well.  I answered all of his questions.  I will see him back in 1 year.    Thank you for requesting me to assist in this patient's care.        Claude Yeager MD  Sleep Medicine  04/13/22  08:22 EDT

## 2022-04-09 PROBLEM — E66.813 CLASS 3 SEVERE OBESITY DUE TO EXCESS CALORIES WITH SERIOUS COMORBIDITY AND BODY MASS INDEX (BMI) OF 40.0 TO 44.9 IN ADULT: Status: ACTIVE | Noted: 2018-05-03

## 2022-05-10 NOTE — PROGRESS NOTES
Subjective   Patient ID: Will Garcia is a 72 y.o. male chronic low back pain, Nov 17 left leg started giving out and going numb with foot drag.     History of Present Illness     He has had chronic low back discomfort over the last 30-40 years.  He apparently had a mechanical injury while in the [employer].  He has been treated intermittently with epidural injections which seemed to help things.  He had an injection in November.  Following that injection he developed a sense of weakness in the left lower extremity.  He has had the knee go out on him not fallen.  His foot will drag that occurs.  He notices that this gets worse with the farther that he walks.  Is that if he bends over or sits down that leg weakness seems to improve significantly.  This is worse in the morning and as the day progresses doesn't occur as often.  He does occur on a daily basis.  This is not associated with numbness or tingling in the leg.  He denies bowel or bladder problems associated with this.    Been treated with epidural injections as well as physical therapy.  He continues to note this problem continuing.  He was sent for an EMG as well.    Kidney stones in the past and indicates that he has had some trouble with anesthesia in the past.        The following portions of the patient's history were reviewed and updated as appropriate: allergies, current medications, past family history, past medical history, past social history, past surgical history and problem list.    Review of Systems   Constitutional: Negative for chills and fatigue.   HENT: Negative for sinus pressure.    Respiratory: Negative for chest tightness. Shortness of breath: normal for him.    Gastrointestinal: Negative for constipation.   Genitourinary: Positive for difficulty urinating (has BPH takes Flomax). Negative for frequency.   Musculoskeletal: Positive for back pain and gait problem.   Neurological: Positive for numbness (left leg with foot drag).    Psychiatric/Behavioral: Negative for sleep disturbance.       Objective   Physical Exam   Constitutional: He is oriented to person, place, and time.   Neurological: He is oriented to person, place, and time. He has normal strength. He has a normal Finger-Nose-Finger Test, a normal Heel to Shin Test, a normal Romberg Test and a normal Tandem Gait Test.   Reflex Scores:       Tricep reflexes are 1+ on the right side and 1+ on the left side.       Bicep reflexes are 2+ on the right side and 2+ on the left side.       Brachioradialis reflexes are 1+ on the right side and 1+ on the left side.       Patellar reflexes are 2+ on the right side and 2+ on the left side.       Achilles reflexes are 1+ on the right side and 0 on the left side.    Neurologic Exam     Mental Status   Oriented to person, place, and time.   Level of consciousness: alert    Motor Exam   Muscle bulk: normal  Overall muscle tone: normal  Right arm tone: normal  Left arm tone: normal  Right leg tone: normal  Left leg tone: normal    Strength   Strength 5/5 throughout.     Sensory Exam   Light touch normal.   Vibration normal.   Proprioception normal.   Pinprick normal.     Gait, Coordination, and Reflexes     Gait  Gait: wide-based    Coordination   Romberg: negative  Finger to nose coordination: normal  Heel to shin coordination: normal  Tandem walking coordination: normal    Reflexes   Right brachioradialis: 1+  Left brachioradialis: 1+  Right biceps: 2+  Left biceps: 2+  Right triceps: 1+  Left triceps: 1+  Right patellar: 2+  Left patellar: 2+  Right achilles: 1+  Left achilles: 0  Right plantar: normal  Left plantar: normal  Right Patel: absent  Left Patel: absentUnable to toe walk on the left - foot drops       Assessment/Plan   Independent Review of Radiographic Studies:      I personally reviewed the MRI scan of the lumbar spine done in 2016: This demonstrates moderate lateral recess stenosis at L4 5 secondary to facet and ligamentum  flavum hypertrophy.  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  There is also significant epidural lipomatosis in the sacral epidural space including the L5-S1 region.  Medical Decision Making:      The patient presents with the above-noted history and physical findings.  He has weakness manifest by he'll drop with walking on his toes on the left side.  He notes overall weakness in the left lower extremity comes on intermittently but is associated with ambulation.  I suspect that this is a manifestation of neurogenic claudication which occurs as a result of progressive compression of the vasculature surrounding the already compressed nerve roots with increased demand of walking.  He classically improves with seated position or bending over.    Treatment options include continued conservative management or consideration of surgical intervention.  Unfortunately I do not have the MRI scan to evaluate though I think we will have her later on today.    The patient has had a recent EMG that does demonstrate a mild left L5 radiculopathy.        Diagnoses and all orders for this visit:    Type 2 diabetes mellitus with diabetic neuropathy, with long-term current use of insulin  -     Ambulatory Referral to Cardiology  -     Ambulatory Referral to Internal Medicine    Spinal stenosis, lumbar region, with neurogenic claudication    Essential hypertension  -     Ambulatory Referral to Internal Medicine    Benign prostatic hyperplasia with urinary frequency    Morbid obesity with BMI of 40.0-44.9, adult  -     Ambulatory Referral to Cardiology    Obstructive sleep apnea  -     Ambulatory Referral to Cardiology      Return for Follow up after testing.                  01-Jan-2020

## 2023-04-12 ENCOUNTER — OFFICE VISIT (OUTPATIENT)
Dept: SLEEP MEDICINE | Facility: HOSPITAL | Age: 78
End: 2023-04-12
Payer: COMMERCIAL

## 2023-04-12 VITALS — HEIGHT: 68 IN | HEART RATE: 81 BPM | BODY MASS INDEX: 33.34 KG/M2 | WEIGHT: 220 LBS | OXYGEN SATURATION: 94 %

## 2023-04-12 DIAGNOSIS — G47.36 HYPOXEMIA ASSOCIATED WITH SLEEP: ICD-10-CM

## 2023-04-12 DIAGNOSIS — G47.33 OBSTRUCTIVE SLEEP APNEA: Primary | ICD-10-CM

## 2023-04-12 PROCEDURE — 99213 OFFICE O/P EST LOW 20 MIN: CPT | Performed by: INTERNAL MEDICINE

## 2023-04-12 PROCEDURE — G0463 HOSPITAL OUTPT CLINIC VISIT: HCPCS

## 2023-04-12 NOTE — PROGRESS NOTES
"Follow Up Sleep Disorders Center Note     Chief Complaint:  HELEN     Primary Care Physician: Dereck Lemus MD    Interval History:   The patient is a 77 y.o. male  who I last saw 4/7/2022 and that note was reviewed.  The patient is here today with his wife.  He states he is unchanged without new complaints.  He goes to bed 11 PM and gets out of bed at 8 AM.  He awakens mainly due to his mask.    Review of Systems:    A complete review of systems was done and all were negative with the exception of the above    Social History:    Social History     Socioeconomic History   • Marital status:      Spouse name: Melanie   • Number of children: 2   • Years of education: 21   Tobacco Use   • Smoking status: Never   • Smokeless tobacco: Never   Substance and Sexual Activity   • Alcohol use: No   • Drug use: No   • Sexual activity: Defer       Allergies:  Ceclor [cefaclor]     Medication Review:  Reviewed.      Vital Signs:    Vitals:    04/12/23 1026   Pulse: 81   SpO2: 94%   Weight: 99.8 kg (220 lb)   Height: 172.7 cm (68\")     Body mass index is 33.45 kg/m².    Physical Exam:    Constitutional:  Well developed 77 y.o. male that appears in no apparent distress.  Awake & oriented times 3.  Normal mood with normal recent and remote memory and normal judgement.  Eyes:  Conjunctivae normal.  Oropharynx: Previously, moist mucous membranes without exudate and a large tongue and class III Mallampati airway.    Self-administered [place] Sleepiness Scale test results: 2, previously 2  0-5 Lower normal daytime sleepiness  6-10 Higher normal daytime sleepiness  11-12 Mild, 13-15 Moderate, & 16-24 Severe excessive daytime sleepiness     Downloaded PAP Data Reviewed For Therapeutic Response and Compliance:  DME is [facility] and he needs to be changed to [employer] and he uses a Nuance nasal interface.  Downloads between 1/10 and 4/9/2023 compliance 100%.  Average usage is 8 hours and 54 minutes.  Average AHI is normal without leak.  " Average auto CPAP pressure is 14.6 and his ResMed auto CPAP is 11-15    I have reviewed the above results and compared them with the patient's last downloads and reviewed with the patient.    Impression:   Severe obstructive sleep apnea with sleep-related hypoxia by overnight polysomnogram 4/2/2010, weight 254 pounds, adequately treated with ResMed auto CPAP. The patient appears to be at goal with good compliance and usage. The patient has no complaints of hypersomnolence.    Plan:  Good sleep hygiene measures should be maintained.  Weight loss would be beneficial in this patient who is obese by Body mass index is 33.45 kg/m²..      After evaluating the patient and assessing results available, the patient is benefiting from the treatment being provided.     The patient will continue ResMed auto CPAP.  Potential side effects of CPAP therapy reviewed and addressed as needed.  After clinical evaluation and review of downloads, I recommend no changes to the patient's pressures.  A new prescription will be sent to the patient's DME.    I answered all of the patient's questions.  The patient will call the [facility] for any problems with side effects of CPAP therapy and will follow up in 1 year.      Claude Yeager MD  Sleep Medicine  04/12/23  10:37 EDT

## 2023-04-16 PROBLEM — E66.01 CLASS 3 SEVERE OBESITY DUE TO EXCESS CALORIES WITH SERIOUS COMORBIDITY AND BODY MASS INDEX (BMI) OF 40.0 TO 44.9 IN ADULT: Status: RESOLVED | Noted: 2018-05-03 | Resolved: 2023-04-16

## 2023-04-16 PROBLEM — G47.36 HYPOXEMIA ASSOCIATED WITH SLEEP: Status: ACTIVE | Noted: 2023-04-16

## 2023-04-16 PROBLEM — E66.813 CLASS 3 SEVERE OBESITY DUE TO EXCESS CALORIES WITH SERIOUS COMORBIDITY AND BODY MASS INDEX (BMI) OF 40.0 TO 44.9 IN ADULT: Status: RESOLVED | Noted: 2018-05-03 | Resolved: 2023-04-16

## 2024-04-24 ENCOUNTER — OFFICE VISIT (OUTPATIENT)
Dept: SLEEP MEDICINE | Facility: HOSPITAL | Age: 79
End: 2024-04-24
Payer: COMMERCIAL

## 2024-04-24 VITALS — BODY MASS INDEX: 43.35 KG/M2 | HEART RATE: 73 BPM | WEIGHT: 286 LBS | HEIGHT: 68 IN | OXYGEN SATURATION: 94 %

## 2024-04-24 DIAGNOSIS — E66.01 CLASS 3 SEVERE OBESITY DUE TO EXCESS CALORIES WITH SERIOUS COMORBIDITY AND BODY MASS INDEX (BMI) OF 40.0 TO 44.9 IN ADULT: ICD-10-CM

## 2024-04-24 DIAGNOSIS — G47.33 OBSTRUCTIVE SLEEP APNEA: Primary | ICD-10-CM

## 2024-04-24 DIAGNOSIS — G47.36 HYPOXEMIA ASSOCIATED WITH SLEEP: ICD-10-CM

## 2024-04-24 PROCEDURE — G0463 HOSPITAL OUTPT CLINIC VISIT: HCPCS

## 2024-04-24 PROCEDURE — 99213 OFFICE O/P EST LOW 20 MIN: CPT | Performed by: INTERNAL MEDICINE

## 2024-04-24 NOTE — PROGRESS NOTES
"Follow Up Sleep Disorders Center Note     Chief Complaint:  HELEN     Primary Care Physician: Dereck Lemus MD    Interval History:   The patient is a 78 y.o. male  who I last saw 4/12/2023 and that note was reviewed.  The patient reports he is doing well without new complaints.  He goes to bed 11 PM and gets out of bed at 8 AM.  He will awaken with a stuffy nose at times.    Review of Systems:    A complete review of systems was done and all were negative with the exception of some nasal congestion and shortness of breath with exertion    Social History:    Social History     Socioeconomic History    Marital status:      Spouse name: Melanie    Number of children: 2    Years of education: 21   Tobacco Use    Smoking status: Never    Smokeless tobacco: Never   Substance and Sexual Activity    Alcohol use: No    Drug use: No    Sexual activity: Defer       Allergies:  Ceclor [cefaclor]     Medication Review:  Reviewed.      Vital Signs:    Vitals:    04/24/24 1058   Pulse: 73   SpO2: 94%   Weight: 130 kg (286 lb)   Height: 172.7 cm (68\")     Body mass index is 43.49 kg/m².    Physical Exam:    Constitutional:  Well developed 78 y.o. male that appears in no apparent distress.  Awake & oriented times 3.  Normal mood with normal recent and remote memory and normal judgement.  Eyes:  Conjunctivae normal.  Oropharynx: Previously, moist mucous membranes without exudate and a large tongue and class III Mallampati airway.    Self-administered [place] Sleepiness Scale test results: 2-3  0-5 Lower normal daytime sleepiness  6-10 Higher normal daytime sleepiness  11-12 Mild, 13-15 Moderate, & 16-24 Severe excessive daytime sleepiness     Downloaded PAP Data Evaluated For Therapeutic Response and Compliance:  DME is [facility] and he uses nasal pillows.  Downloads between 1/24 and 4/22/2024 compliance 100%.  Average usage is 9 hours and 3 minutes.  Average AHI is normal without leak.  Average auto CPAP pressure is 14.5 and " his ResMed auto CPAP is 11-15    I have reviewed the above results and compared them with the patient's last downloads and reviewed with the patient.    Impression:   Severe obstructive sleep apnea with sleep-related hypoxia by overnight polysomnogram 4/2/2010, weight 254 pounds, adequately treated with ResMed auto CPAP. The patient appears to be at goal with good compliance and usage. The patient has no complaints of hypersomnolence.     Plan:  Good sleep hygiene measures should be maintained.  Weight loss would be beneficial in this patient who has class III severe obesity by Body mass index is 43.49 kg/m².      After evaluating the patient and assessing results available, the patient is benefiting from the treatment being provided.     The patient will continue ResMed auto CPAP.  Potential side effects of not using PAP therapy reviewed and addressed as needed.  After clinical evaluation and review of downloads, I recommend no changes to the patient's pressures.  A new prescription will be sent to the patient's DME.    I answered all of the patient's questions.  The patient will call the [facility] for any problems and will follow up in 1 year.      Claude Yeager MD  Sleep Medicine  04/24/24  11:10 EDT

## 2025-03-14 ENCOUNTER — APPOINTMENT (OUTPATIENT)
Dept: CT IMAGING | Facility: HOSPITAL | Age: 80
End: 2025-03-14
Payer: COMMERCIAL

## 2025-03-14 ENCOUNTER — APPOINTMENT (OUTPATIENT)
Dept: GENERAL RADIOLOGY | Facility: HOSPITAL | Age: 80
End: 2025-03-14
Payer: COMMERCIAL

## 2025-03-14 ENCOUNTER — APPOINTMENT (OUTPATIENT)
Dept: ULTRASOUND IMAGING | Facility: HOSPITAL | Age: 80
End: 2025-03-14
Payer: COMMERCIAL

## 2025-03-14 ENCOUNTER — HOSPITAL ENCOUNTER (EMERGENCY)
Facility: HOSPITAL | Age: 80
Discharge: HOME | End: 2025-03-14
Attending: STUDENT IN AN ORGANIZED HEALTH CARE EDUCATION/TRAINING PROGRAM
Payer: COMMERCIAL

## 2025-03-14 VITALS
DIASTOLIC BLOOD PRESSURE: 75 MMHG | WEIGHT: 280 LBS | HEIGHT: 68 IN | HEART RATE: 67 BPM | RESPIRATION RATE: 18 BRPM | OXYGEN SATURATION: 95 % | SYSTOLIC BLOOD PRESSURE: 119 MMHG | BODY MASS INDEX: 42.44 KG/M2 | TEMPERATURE: 98.6 F

## 2025-03-14 DIAGNOSIS — R10.13 EPIGASTRIC PAIN: Primary | ICD-10-CM

## 2025-03-14 LAB
ALBUMIN SERPL-MCNC: 4.5 G/DL (ref 3.5–5.2)
ALBUMIN/GLOB SERPL: 1.5 G/DL
ALP SERPL-CCNC: 91 U/L (ref 39–117)
ALT SERPL W P-5'-P-CCNC: 10 U/L (ref 1–41)
ANION GAP SERPL CALCULATED.3IONS-SCNC: 12.2 MMOL/L (ref 5–15)
AST SERPL-CCNC: 14 U/L (ref 1–40)
BASOPHILS # BLD AUTO: 0.01 10*3/MM3 (ref 0–0.2)
BASOPHILS NFR BLD AUTO: 0.1 % (ref 0–1.5)
BILIRUB SERPL-MCNC: 0.4 MG/DL (ref 0–1.2)
BUN SERPL-MCNC: 25 MG/DL (ref 8–23)
BUN/CREAT SERPL: 25.5 (ref 7–25)
CALCIUM SPEC-SCNC: 10.4 MG/DL (ref 8.6–10.5)
CHLORIDE SERPL-SCNC: 100 MMOL/L (ref 98–107)
CO2 SERPL-SCNC: 26.8 MMOL/L (ref 22–29)
CREAT SERPL-MCNC: 0.98 MG/DL (ref 0.76–1.27)
DEPRECATED RDW RBC AUTO: 44.7 FL (ref 37–54)
EGFRCR SERPLBLD CKD-EPI 2021: 78.9 ML/MIN/1.73
EOSINOPHIL # BLD AUTO: 0.12 10*3/MM3 (ref 0–0.4)
EOSINOPHIL NFR BLD AUTO: 1.3 % (ref 0.3–6.2)
ERYTHROCYTE [DISTWIDTH] IN BLOOD BY AUTOMATED COUNT: 13.6 % (ref 12.3–15.4)
GEN 5 1HR TROPONIN T REFLEX: 13 NG/L
GLOBULIN UR ELPH-MCNC: 3 GM/DL
GLUCOSE SERPL-MCNC: 230 MG/DL (ref 65–99)
HCT VFR BLD AUTO: 42.5 % (ref 37.5–51)
HGB BLD-MCNC: 13.8 G/DL (ref 13–17.7)
HOLD SPECIMEN: NORMAL
HOLD SPECIMEN: NORMAL
IMM GRANULOCYTES # BLD AUTO: 0.03 10*3/MM3 (ref 0–0.05)
IMM GRANULOCYTES NFR BLD AUTO: 0.3 % (ref 0–0.5)
LIPASE SERPL-CCNC: 37 U/L (ref 13–60)
LYMPHOCYTES # BLD AUTO: 2.11 10*3/MM3 (ref 0.7–3.1)
LYMPHOCYTES NFR BLD AUTO: 22.9 % (ref 19.6–45.3)
MCH RBC QN AUTO: 28.9 PG (ref 26.6–33)
MCHC RBC AUTO-ENTMCNC: 32.5 G/DL (ref 31.5–35.7)
MCV RBC AUTO: 88.9 FL (ref 79–97)
MONOCYTES # BLD AUTO: 0.61 10*3/MM3 (ref 0.1–0.9)
MONOCYTES NFR BLD AUTO: 6.6 % (ref 5–12)
NEUTROPHILS NFR BLD AUTO: 6.34 10*3/MM3 (ref 1.7–7)
NEUTROPHILS NFR BLD AUTO: 68.8 % (ref 42.7–76)
PLATELET # BLD AUTO: 224 10*3/MM3 (ref 140–450)
PMV BLD AUTO: 10.1 FL (ref 6–12)
POTASSIUM SERPL-SCNC: 3.8 MMOL/L (ref 3.5–5.2)
PROT SERPL-MCNC: 7.5 G/DL (ref 6–8.5)
RBC # BLD AUTO: 4.78 10*6/MM3 (ref 4.14–5.8)
SODIUM SERPL-SCNC: 139 MMOL/L (ref 136–145)
TROPONIN T NUMERIC DELTA: -1 NG/L
TROPONIN T SERPL HS-MCNC: 14 NG/L
WBC NRBC COR # BLD AUTO: 9.22 10*3/MM3 (ref 3.4–10.8)
WHOLE BLOOD HOLD COAG: NORMAL
WHOLE BLOOD HOLD SPECIMEN: NORMAL

## 2025-03-14 PROCEDURE — 93005 ELECTROCARDIOGRAM TRACING: CPT | Performed by: STUDENT IN AN ORGANIZED HEALTH CARE EDUCATION/TRAINING PROGRAM

## 2025-03-14 PROCEDURE — 74177 CT ABD & PELVIS W/CONTRAST: CPT

## 2025-03-14 PROCEDURE — 96375 TX/PRO/DX INJ NEW DRUG ADDON: CPT

## 2025-03-14 PROCEDURE — 25510000001 IOPAMIDOL PER 1 ML: Performed by: STUDENT IN AN ORGANIZED HEALTH CARE EDUCATION/TRAINING PROGRAM

## 2025-03-14 PROCEDURE — 85025 COMPLETE CBC W/AUTO DIFF WBC: CPT

## 2025-03-14 PROCEDURE — 99284 EMERGENCY DEPT VISIT MOD MDM: CPT | Performed by: EMERGENCY MEDICINE

## 2025-03-14 PROCEDURE — 36415 COLL VENOUS BLD VENIPUNCTURE: CPT

## 2025-03-14 PROCEDURE — 84484 ASSAY OF TROPONIN QUANT: CPT | Performed by: STUDENT IN AN ORGANIZED HEALTH CARE EDUCATION/TRAINING PROGRAM

## 2025-03-14 PROCEDURE — 83690 ASSAY OF LIPASE: CPT | Performed by: STUDENT IN AN ORGANIZED HEALTH CARE EDUCATION/TRAINING PROGRAM

## 2025-03-14 PROCEDURE — 80053 COMPREHEN METABOLIC PANEL: CPT

## 2025-03-14 PROCEDURE — 99285 EMERGENCY DEPT VISIT HI MDM: CPT

## 2025-03-14 PROCEDURE — 96374 THER/PROPH/DIAG INJ IV PUSH: CPT

## 2025-03-14 PROCEDURE — 25010000002 HYDROMORPHONE PER 4 MG: Performed by: EMERGENCY MEDICINE

## 2025-03-14 PROCEDURE — 25010000002 ONDANSETRON PER 1 MG: Performed by: STUDENT IN AN ORGANIZED HEALTH CARE EDUCATION/TRAINING PROGRAM

## 2025-03-14 PROCEDURE — 71045 X-RAY EXAM CHEST 1 VIEW: CPT

## 2025-03-14 PROCEDURE — 76705 ECHO EXAM OF ABDOMEN: CPT

## 2025-03-14 PROCEDURE — 96376 TX/PRO/DX INJ SAME DRUG ADON: CPT

## 2025-03-14 PROCEDURE — 84484 ASSAY OF TROPONIN QUANT: CPT

## 2025-03-14 PROCEDURE — 25010000002 MORPHINE PER 10 MG: Performed by: STUDENT IN AN ORGANIZED HEALTH CARE EDUCATION/TRAINING PROGRAM

## 2025-03-14 RX ORDER — IOPAMIDOL 755 MG/ML
100 INJECTION, SOLUTION INTRAVASCULAR
Status: COMPLETED | OUTPATIENT
Start: 2025-03-14 | End: 2025-03-14

## 2025-03-14 RX ORDER — ONDANSETRON 2 MG/ML
4 INJECTION INTRAMUSCULAR; INTRAVENOUS ONCE
Status: COMPLETED | OUTPATIENT
Start: 2025-03-14 | End: 2025-03-14

## 2025-03-14 RX ORDER — MORPHINE SULFATE 4 MG/ML
4 INJECTION, SOLUTION INTRAMUSCULAR; INTRAVENOUS ONCE
Status: COMPLETED | OUTPATIENT
Start: 2025-03-14 | End: 2025-03-14

## 2025-03-14 RX ORDER — ALUMINA, MAGNESIA, AND SIMETHICONE 2400; 2400; 240 MG/30ML; MG/30ML; MG/30ML
30 SUSPENSION ORAL ONCE
Status: COMPLETED | OUTPATIENT
Start: 2025-03-14 | End: 2025-03-14

## 2025-03-14 RX ORDER — HYDROCODONE BITARTRATE AND ACETAMINOPHEN 5; 325 MG/1; MG/1
1 TABLET ORAL EVERY 6 HOURS PRN
Qty: 12 TABLET | Refills: 0 | Status: SHIPPED | OUTPATIENT
Start: 2025-03-14 | End: 2025-03-14

## 2025-03-14 RX ORDER — HYDROCODONE BITARTRATE AND ACETAMINOPHEN 5; 325 MG/1; MG/1
1 TABLET ORAL EVERY 6 HOURS PRN
Qty: 12 TABLET | Refills: 0 | Status: SHIPPED | OUTPATIENT
Start: 2025-03-14

## 2025-03-14 RX ORDER — SODIUM CHLORIDE 0.9 % (FLUSH) 0.9 %
10 SYRINGE (ML) INJECTION AS NEEDED
Status: DISCONTINUED | OUTPATIENT
Start: 2025-03-14 | End: 2025-03-14 | Stop reason: HOSPADM

## 2025-03-14 RX ORDER — ASPIRIN 325 MG
325 TABLET ORAL ONCE
Status: COMPLETED | OUTPATIENT
Start: 2025-03-14 | End: 2025-03-14

## 2025-03-14 RX ORDER — FAMOTIDINE 10 MG/ML
20 INJECTION, SOLUTION INTRAVENOUS ONCE
Status: COMPLETED | OUTPATIENT
Start: 2025-03-14 | End: 2025-03-14

## 2025-03-14 RX ORDER — HYDROMORPHONE HYDROCHLORIDE 1 MG/ML
0.5 INJECTION, SOLUTION INTRAMUSCULAR; INTRAVENOUS; SUBCUTANEOUS ONCE
Refills: 0 | Status: COMPLETED | OUTPATIENT
Start: 2025-03-14 | End: 2025-03-14

## 2025-03-14 RX ADMIN — FAMOTIDINE 20 MG: 10 INJECTION INTRAVENOUS at 07:33

## 2025-03-14 RX ADMIN — ASPIRIN 325 MG ORAL TABLET 325 MG: 325 PILL ORAL at 06:12

## 2025-03-14 RX ADMIN — ALUMINUM HYDROXIDE, MAGNESIUM HYDROXIDE, AND DIMETHICONE 30 ML: 400; 400; 40 SUSPENSION ORAL at 10:48

## 2025-03-14 RX ADMIN — MORPHINE SULFATE 4 MG: 4 INJECTION, SOLUTION INTRAMUSCULAR; INTRAVENOUS at 06:12

## 2025-03-14 RX ADMIN — IOPAMIDOL 100 ML: 755 INJECTION, SOLUTION INTRAVENOUS at 06:54

## 2025-03-14 RX ADMIN — HYDROMORPHONE HYDROCHLORIDE 0.5 MG: 1 INJECTION, SOLUTION INTRAMUSCULAR; INTRAVENOUS; SUBCUTANEOUS at 07:53

## 2025-03-14 RX ADMIN — ONDANSETRON 4 MG: 2 INJECTION, SOLUTION INTRAMUSCULAR; INTRAVENOUS at 06:12

## 2025-03-14 RX ADMIN — MORPHINE SULFATE 4 MG: 4 INJECTION, SOLUTION INTRAMUSCULAR; INTRAVENOUS at 07:01

## 2025-03-14 NOTE — FSED PROVIDER NOTE
Subjective   History of Present Illness  79-year-old male with past medical history of CAD, status post stent, presents emergency department epigastric pain.  Patient states that his pain started all of a sudden, and was associated with nausea.  He denies any substernal chest pain, or radiating chest pain at this time.  He denies any episodes of vomiting.  He also denies any fevers, chills, shortness of breath.        Review of Systems   All other systems reviewed and are negative.      Past Medical History:   Diagnosis Date    Arthritis     Cancer     Cataract     Chronic kidney disease     Chronic pain disorder     Coronary artery disease     Enlarged prostate     Essential hypertension     Hard to intubate     History of kidney stones     Injury of back     Low back pain     Lumbosacral disc disease     Lymph edema     BLE    Neuropathy     BLE    HELEN (obstructive sleep apnea) treated with auto CPAP 04/02/2010    Overnight polysomnogram.  Weight 254 pounds.  Severe HELEN with AHI 41.8 events per hour.  Sleep-related hypoxia also present.  Auto CPAP prescribed.    Osteoporosis     Spinal stenosis     Type 2 diabetes mellitus with diabetic neuropathy, with long-term current use of insulin        Allergies   Allergen Reactions    Ceclor [Cefaclor] Swelling       Past Surgical History:   Procedure Laterality Date    CARDIAC CATHETERIZATION Left 5/17/2018    Procedure: Cardiac Catheterization/Vascular Study;  Surgeon: Kaitlin Barney MD;  Location:  [facility];  Service: Cardiovascular    CARDIAC CATHETERIZATION N/A 5/17/2018    Procedure: Stent LACEY coronary;  Surgeon: Kaitlin Barney MD;  Location:  [facility];  Service: Cardiovascular    CATARACT EXTRACTION      COLONOSCOPY N/A 4/3/2018    Procedure: COLONOSCOPY TO CECUM;  Surgeon: Royal Metcalf MD;  Location: [facility] ENDOSCOPY;  Service: General    EPIDURAL BLOCK      KIDNEY STONE SURGERY      LUMBAR DISCECTOMY Left 2/27/2019     Procedure: L4-5 lumbar decompression;  Surgeon: Darío Chowdhury MD;  Location: [facility] OR;  Service: Neurosurgery    SHOULDER ARTHROSCOPY Left     TONSILLECTOMY         Family History   Problem Relation Age of Onset    Diabetes Other     Stroke Other     Heart disease Father     Diabetes Maternal Grandmother     Diabetes Maternal Grandfather     No Known Problems Paternal Grandmother     No Known Problems Paternal Grandfather     Malig Hyperthermia Neg Hx        Social History     Socioeconomic History    Marital status:      Spouse name: Melanie    Number of children: 2    Years of education: 21   Tobacco Use    Smoking status: Never    Smokeless tobacco: Never   Substance and Sexual Activity    Alcohol use: No    Drug use: No    Sexual activity: Defer           Objective   Physical Exam  Constitutional:       Appearance: Normal appearance.   HENT:      Head: Normocephalic.      Mouth/Throat:      Mouth: Mucous membranes are moist.   Eyes:      Pupils: Pupils are equal, round, and reactive to light.   Cardiovascular:      Rate and Rhythm: Normal rate and regular rhythm.   Pulmonary:      Effort: Pulmonary effort is normal.   Abdominal:      Palpations: Abdomen is soft.      Tenderness:  in the epigastric area   Musculoskeletal:         General: Normal range of motion.      Cervical back: Normal range of motion.   Neurological:      General: No focal deficit present.      Mental Status: He is alert.   Psychiatric:         Mood and Affect: Mood normal.         Procedures           ED Course  ED Course as of 03/14/25 1053   Fri Mar 14, 2025   0742 CT pertinent results     IMPRESSION:  1.  Cholelithiasis present. The gallbladder is mildly distended with  suggestion of subtle surrounding pericholecystic stranding raising  concern for cholecystitis in the appropriate context. Further evaluation  with right upper quadrant ultrasound is recommended.  2.  Other findings above      [AR]   0746 Patient is having  frequent PVCs and then has long periods of none. [AR]   0756 Xray chest cardiomegaly, no pneumonia, effusions, normal mediastinum on one view, no pnueumothorax [AR]   0800 Patient's wife called in and said he is prescribed motrin 800 mg 3 x a day, this cold be a source of epigastric pain of the stomach [AR]   0801 Patient says that the only different food yesterday that he rarely has is a [employer] hamburger for lunch. The discomfort began around 10 pm  He usually goes to sleep at 11 pm and with the CPAP sleeps very well nightly but he could not sleep due to pain last night and came here. [AR]   1028 FINDINGS: Visualized portions of the pancreas normal in echotexture  without ductal dilatation. Antegrade flow within the main portal vein.  Hepatic echotexture within normal limits. The right kidney is 14 cm in  length and normal in appearance. Shadowing gallstones within the  dependent portion of the gallbladder. No gallbladder wall thickening or  para cholecystic fluid or biliary duct dilatation with the CBD diameter  of 5 mm. No free fluid within the right upper quadrant.     CONCLUSION: Gallstones. No gallbladder wall thickening or para  cholecystic fluid or biliary duct dilatation.    No evidence of cholecystitis by this USG [AR]   1035 We discussed the ultrasound results did not show infection at this time and the blood work has not either.  He still having a 3 out of 10 pain but is better than when he arrived.  We discussed that this could be either a gastritis type pain related to the ibuprofen use of 800 mg 3 times a day and/or to even 1 drop of acid getting up in the esophagus and causing burn.  We decided to trial Maalox here to coat everything and to have some narcotic available at home to treat between ibuprofen and always take ibuprofen with food or fluid and he agreed to this.  He also agreed to follow-up with his primary care doctor he is a  and [facility]. [AR]   1053 Pt needed  to change pharmacies, norco sent to the newer pharmacy [AR]      ED Course User Index  [AR] Aracely Du MD                                           Medical Decision Making  79-year-old male with past medical history of CAD, status post stent, presents emergency department epigastric pain.  Patient states that his pain started all of a sudden, and was associated with nausea.  He denies any substernal chest pain, or radiating chest pain at this time.  He denies any episodes of vomiting.  He also denies any fevers, chills, shortness of breath.  Vital signs within normal limits upon arrival.  Differential diagnosis includes ACS, MI, pancreatitis, cholecystitis, appendicitis, diverticulitis, gastritis, among others.  Patient's symptoms were controlled with morphine, and Zofran.  EKG independently interpreted by me showed no signs of acute ischemia.  All labs within normal limits other than mildly elevated BUN, and elevated glucose without signs of DKA.  CT imaging is currently pending, and transfer of care will be handed off to oncoming physician for ultimate disposition.    The work up today of lab work, CT abdomen and ultrasound of the gallbladder did not find anything concerning or a cause of your pain. It is possible that the ibuprofen is irritating the lining of the stomach.  Also possible that even 1 drop of acid from the stomach into the esophagus can cause a burn in the esophagus.  As you know burns are quite uncomfortable.  After treatment today I recommend trying Maalox to soothe the esophagus and stomach.  If the Maalox is not doing enough I have sent hydrocodone prescription to your pharmacy.  You can alternate the hydrocodone every 6-8 hours with ibuprofen but if you take ibuprofen always take with food.  Please follow-up with your primary care doctor next week.  He understood and agreed    Problems Addressed:  Epigastric pain: complicated acute illness or injury    Amount and/or Complexity of Data  Reviewed  Labs: ordered.  Radiology: ordered.  ECG/medicine tests: ordered and independent interpretation performed.    Risk  OTC drugs.  Prescription drug management.        Final diagnoses:   Epigastric pain       ED Disposition  ED Disposition       ED Disposition   Discharge    Condition   Stable    Comment   --               Dereck Lemus MD  [address] [id number] [phone]    In 3 days      Dereck Lemus MD  [address] [id number] [phone]      Next week, As needed         Medication List        New Prescriptions      HYDROcodone-acetaminophen 5-325 MG per tablet  Commonly known as: NORCO  Take 1 tablet by mouth Every 6 (Six) Hours As Needed for Moderate Pain for up to 12 doses.               Where to Get Your Medications        These medications were sent to [address] - [address] [facility] - [phone]  - [phone]   [address] [facility] [address]      Phone: [phone]   HYDROcodone-acetaminophen 5-325 MG per tablet            Where to Get Your Medications        These medications were sent to [facility] [address] [place] - [address] [facility] - [phone]  - [phone] [facility] [place] [address]      Phone: [phone]   HYDROcodone-acetaminophen 5-325 MG per tablet

## 2025-03-14 NOTE — ED NOTES
"Pt to room 01 with complaint of mid-sternal abdominal pain that started around 2200.  Pt reports that he usually sleeps well, but has been up throughout the night feeling like he needed to vomit.  Pt denies recent trauma to the abdomen and he felt \"fine\" during the day.  Pt states that he did have a stent placed 6 years ago due to 90% blockage.  Denies the pain radiating to other areas.  Pt is alert and oriented X4, PERRLA, respirations are even and unlabored, chest rise and fall is equal in expansion.  Pt denies fever, chills, blurred vision, loss in control of bowel/bladder, numbness and/or tingling of the extremities at this time.     "

## 2025-03-14 NOTE — DISCHARGE INSTRUCTIONS
The work up today of lab work, CT abdomen and ultrasound of the gallbladder did not find anything concerning or a cause of your pain. It is possible that the ibuprofen is irritating the lining of the stomach.  Also possible that even 1 drop of acid from the stomach into the esophagus can cause a burn in the esophagus.  As you know burns are quite uncomfortable.  After treatment today I recommend trying Maalox to soothe the esophagus and stomach.  If the Maalox is not doing enough I have sent hydrocodone prescription to your pharmacy.  You can alternate the hydrocodone every 6-8 hours with ibuprofen but if you take ibuprofen always take with food.  Please follow-up with your primary care doctor next week.

## 2025-03-16 ENCOUNTER — NURSE TRIAGE (OUTPATIENT)
Dept: CALL CENTER | Facility: HOSPITAL | Age: 80
End: 2025-03-16
Payer: COMMERCIAL

## 2025-03-16 ENCOUNTER — HOSPITAL ENCOUNTER (INPATIENT)
Facility: HOSPITAL | Age: 80
LOS: 2 days | Discharge: HOME | End: 2025-03-20
Attending: EMERGENCY MEDICINE | Admitting: INTERNAL MEDICINE
Payer: COMMERCIAL

## 2025-03-16 ENCOUNTER — APPOINTMENT (OUTPATIENT)
Dept: CT IMAGING | Facility: HOSPITAL | Age: 80
End: 2025-03-16
Payer: MEDICARE

## 2025-03-16 ENCOUNTER — APPOINTMENT (OUTPATIENT)
Dept: CT IMAGING | Facility: HOSPITAL | Age: 80
End: 2025-03-16
Payer: COMMERCIAL

## 2025-03-16 DIAGNOSIS — E66.813 CLASS 3 SEVERE OBESITY DUE TO EXCESS CALORIES WITH SERIOUS COMORBIDITY AND BODY MASS INDEX (BMI) OF 40.0 TO 44.9 IN ADULT: ICD-10-CM

## 2025-03-16 DIAGNOSIS — K81.9 CHOLECYSTITIS: ICD-10-CM

## 2025-03-16 DIAGNOSIS — E66.01 CLASS 3 SEVERE OBESITY DUE TO EXCESS CALORIES WITH SERIOUS COMORBIDITY AND BODY MASS INDEX (BMI) OF 40.0 TO 44.9 IN ADULT: ICD-10-CM

## 2025-03-16 DIAGNOSIS — E87.79 CARDIAC VOLUME OVERLOAD: ICD-10-CM

## 2025-03-16 DIAGNOSIS — K80.20 GALLSTONES: ICD-10-CM

## 2025-03-16 DIAGNOSIS — K81.0 ACUTE CHOLECYSTITIS: ICD-10-CM

## 2025-03-16 DIAGNOSIS — R10.13 EPIGASTRIC PAIN: ICD-10-CM

## 2025-03-16 DIAGNOSIS — K81.0 ACUTE GANGRENOUS CHOLECYSTITIS: Primary | ICD-10-CM

## 2025-03-16 DIAGNOSIS — G47.33 OBSTRUCTIVE SLEEP APNEA: ICD-10-CM

## 2025-03-16 LAB
ALBUMIN SERPL-MCNC: 3.6 G/DL (ref 3.5–5.2)
ALBUMIN/GLOB SERPL: 1.1 G/DL
ALP SERPL-CCNC: 90 U/L (ref 39–117)
ALT SERPL W P-5'-P-CCNC: 8 U/L (ref 1–41)
ANION GAP SERPL CALCULATED.3IONS-SCNC: 11.8 MMOL/L (ref 5–15)
AST SERPL-CCNC: 14 U/L (ref 1–40)
BASOPHILS # BLD AUTO: 0.02 10*3/MM3 (ref 0–0.2)
BASOPHILS NFR BLD AUTO: 0.1 % (ref 0–1.5)
BILIRUB SERPL-MCNC: 0.6 MG/DL (ref 0–1.2)
BILIRUB UR QL STRIP: NEGATIVE
BUN SERPL-MCNC: 27 MG/DL (ref 8–23)
BUN/CREAT SERPL: 21.6 (ref 7–25)
CALCIUM SPEC-SCNC: 9.8 MG/DL (ref 8.6–10.5)
CHLORIDE SERPL-SCNC: 98 MMOL/L (ref 98–107)
CLARITY UR: CLEAR
CO2 SERPL-SCNC: 28.2 MMOL/L (ref 22–29)
COLOR UR: YELLOW
CREAT SERPL-MCNC: 1.25 MG/DL (ref 0.76–1.27)
D-LACTATE SERPL-SCNC: 0.8 MMOL/L (ref 0.5–2)
DEPRECATED RDW RBC AUTO: 44.5 FL (ref 37–54)
EGFRCR SERPLBLD CKD-EPI 2021: 58.9 ML/MIN/1.73
EOSINOPHIL # BLD AUTO: 0.08 10*3/MM3 (ref 0–0.4)
EOSINOPHIL NFR BLD AUTO: 0.5 % (ref 0.3–6.2)
ERYTHROCYTE [DISTWIDTH] IN BLOOD BY AUTOMATED COUNT: 13.7 % (ref 12.3–15.4)
GEN 5 1HR TROPONIN T REFLEX: 19 NG/L
GLOBULIN UR ELPH-MCNC: 3.3 GM/DL
GLUCOSE SERPL-MCNC: 175 MG/DL (ref 65–99)
GLUCOSE UR STRIP-MCNC: NEGATIVE MG/DL
HCT VFR BLD AUTO: 35.6 % (ref 37.5–51)
HGB BLD-MCNC: 12 G/DL (ref 13–17.7)
HGB UR QL STRIP.AUTO: NEGATIVE
IMM GRANULOCYTES # BLD AUTO: 0.16 10*3/MM3 (ref 0–0.05)
IMM GRANULOCYTES NFR BLD AUTO: 1 % (ref 0–0.5)
KETONES UR QL STRIP: NEGATIVE
LEUKOCYTE ESTERASE UR QL STRIP.AUTO: NEGATIVE
LIPASE SERPL-CCNC: 19 U/L (ref 13–60)
LYMPHOCYTES # BLD AUTO: 1.3 10*3/MM3 (ref 0.7–3.1)
LYMPHOCYTES NFR BLD AUTO: 8.1 % (ref 19.6–45.3)
MCH RBC QN AUTO: 29.9 PG (ref 26.6–33)
MCHC RBC AUTO-ENTMCNC: 33.7 G/DL (ref 31.5–35.7)
MCV RBC AUTO: 88.6 FL (ref 79–97)
MONOCYTES # BLD AUTO: 1.15 10*3/MM3 (ref 0.1–0.9)
MONOCYTES NFR BLD AUTO: 7.1 % (ref 5–12)
NEUTROPHILS NFR BLD AUTO: 13.39 10*3/MM3 (ref 1.7–7)
NEUTROPHILS NFR BLD AUTO: 83.2 % (ref 42.7–76)
NITRITE UR QL STRIP: NEGATIVE
NRBC BLD AUTO-RTO: 0 /100 WBC (ref 0–0.2)
PH UR STRIP.AUTO: <=5 [PH] (ref 5–8)
PLATELET # BLD AUTO: 189 10*3/MM3 (ref 140–450)
PMV BLD AUTO: 10.1 FL (ref 6–12)
POTASSIUM SERPL-SCNC: 3.6 MMOL/L (ref 3.5–5.2)
PROT SERPL-MCNC: 6.9 G/DL (ref 6–8.5)
PROT UR QL STRIP: NEGATIVE
QT INTERVAL: 398 MS
QTC INTERVAL: 423 MS
RBC # BLD AUTO: 4.02 10*6/MM3 (ref 4.14–5.8)
SODIUM SERPL-SCNC: 138 MMOL/L (ref 136–145)
SP GR UR STRIP: 1.01 (ref 1–1.03)
TROPONIN T NUMERIC DELTA: 3 NG/L
TROPONIN T SERPL HS-MCNC: 16 NG/L
UROBILINOGEN UR QL STRIP: NORMAL
WBC NRBC COR # BLD AUTO: 16.1 10*3/MM3 (ref 3.4–10.8)

## 2025-03-16 PROCEDURE — 25010000002 ONDANSETRON PER 1 MG: Performed by: EMERGENCY MEDICINE

## 2025-03-16 PROCEDURE — 83605 ASSAY OF LACTIC ACID: CPT | Performed by: PHYSICIAN ASSISTANT

## 2025-03-16 PROCEDURE — 84484 ASSAY OF TROPONIN QUANT: CPT | Performed by: EMERGENCY MEDICINE

## 2025-03-16 PROCEDURE — G0378 HOSPITAL OBSERVATION PER HR: HCPCS

## 2025-03-16 PROCEDURE — 25510000001 IOPAMIDOL 61 % SOLUTION: Performed by: INTERNAL MEDICINE

## 2025-03-16 PROCEDURE — 36415 COLL VENOUS BLD VENIPUNCTURE: CPT | Performed by: PHYSICIAN ASSISTANT

## 2025-03-16 PROCEDURE — 81003 URINALYSIS AUTO W/O SCOPE: CPT | Performed by: EMERGENCY MEDICINE

## 2025-03-16 PROCEDURE — 25010000002 MORPHINE PER 10 MG: Performed by: EMERGENCY MEDICINE

## 2025-03-16 PROCEDURE — 85025 COMPLETE CBC W/AUTO DIFF WBC: CPT | Performed by: EMERGENCY MEDICINE

## 2025-03-16 PROCEDURE — 99285 EMERGENCY DEPT VISIT HI MDM: CPT

## 2025-03-16 PROCEDURE — 25010000002 PIPERACILLIN SOD-TAZOBACTAM PER 1 G: Performed by: PHYSICIAN ASSISTANT

## 2025-03-16 PROCEDURE — 83690 ASSAY OF LIPASE: CPT | Performed by: EMERGENCY MEDICINE

## 2025-03-16 PROCEDURE — 87040 BLOOD CULTURE FOR BACTERIA: CPT | Performed by: PHYSICIAN ASSISTANT

## 2025-03-16 PROCEDURE — 74177 CT ABD & PELVIS W/CONTRAST: CPT

## 2025-03-16 PROCEDURE — 80053 COMPREHEN METABOLIC PANEL: CPT | Performed by: EMERGENCY MEDICINE

## 2025-03-16 RX ORDER — IOPAMIDOL 612 MG/ML
100 INJECTION, SOLUTION INTRAVASCULAR
Status: COMPLETED | OUTPATIENT
Start: 2025-03-16 | End: 2025-03-16

## 2025-03-16 RX ORDER — ONDANSETRON 4 MG/1
4 TABLET, ORALLY DISINTEGRATING ORAL ONCE
Status: DISCONTINUED | OUTPATIENT
Start: 2025-03-16 | End: 2025-03-17

## 2025-03-16 RX ORDER — ONDANSETRON 2 MG/ML
4 INJECTION INTRAMUSCULAR; INTRAVENOUS ONCE
Status: COMPLETED | OUTPATIENT
Start: 2025-03-16 | End: 2025-03-16

## 2025-03-16 RX ORDER — MORPHINE SULFATE 2 MG/ML
4 INJECTION, SOLUTION INTRAMUSCULAR; INTRAVENOUS ONCE
Status: COMPLETED | OUTPATIENT
Start: 2025-03-16 | End: 2025-03-16

## 2025-03-16 RX ORDER — SODIUM CHLORIDE 0.9 % (FLUSH) 0.9 %
10 SYRINGE (ML) INJECTION AS NEEDED
Status: DISCONTINUED | OUTPATIENT
Start: 2025-03-16 | End: 2025-03-20 | Stop reason: HOSPADM

## 2025-03-16 RX ORDER — MORPHINE SULFATE 2 MG/ML
6 INJECTION, SOLUTION INTRAMUSCULAR; INTRAVENOUS ONCE
Status: DISCONTINUED | OUTPATIENT
Start: 2025-03-16 | End: 2025-03-17

## 2025-03-16 RX ADMIN — ONDANSETRON 4 MG: 2 INJECTION, SOLUTION INTRAMUSCULAR; INTRAVENOUS at 20:58

## 2025-03-16 RX ADMIN — PIPERACILLIN AND TAZOBACTAM 4.5 G: 4; .5 INJECTION, POWDER, FOR SOLUTION INTRAVENOUS at 22:51

## 2025-03-16 RX ADMIN — ONDANSETRON 4 MG: 2 INJECTION, SOLUTION INTRAMUSCULAR; INTRAVENOUS at 21:52

## 2025-03-16 RX ADMIN — MORPHINE SULFATE 4 MG: 2 INJECTION, SOLUTION INTRAMUSCULAR; INTRAVENOUS at 20:57

## 2025-03-16 RX ADMIN — IOPAMIDOL 85 ML: 612 INJECTION, SOLUTION INTRAVENOUS at 23:30

## 2025-03-16 RX ADMIN — MORPHINE SULFATE 4 MG: 2 INJECTION, SOLUTION INTRAMUSCULAR; INTRAVENOUS at 21:53

## 2025-03-16 NOTE — ED PROVIDER NOTES
EMERGENCY DEPARTMENT ENCOUNTER  Room Number:  N636/1  PCP: Dereck Lemus MD  Independent Historians: Patient      HPI:  Chief Complaint: had concerns including Abdominal Pain.     A complete HPI/ROS/PMH/PSH/SH/FH are unobtainable due to: None    Chronic or social conditions impacting patient care (Social Determinants of Health): None      Context: Will Garcia is a 79 y.o. male with a medical history of diabetes, hypertension, arthritis, CAD, CKD and chronic pain who presents to the ED c/o acute severe upper abdominal pain with nausea.  Patient had been seen at a local freestanding emergency department 2 days ago for the same problem.  He was found to have gallstones.  He was hoping to follow-up with his PCP tomorrow for further evaluation of this pain problem.  However the pain continues to escalate beyond his level of tolerance.  He reports a severe sharp pain in the upper abdominal area which radiates towards the back.  It has caused some nausea and vomiting.  He has had a very poor appetite.  He tried to eat an egg sandwich this afternoon but could only eat one half of it before having to stop.  He denies fevers.  Denies diarrhea.  Denies blood per rectum.  Denies dysuria or hematuria.      Review of prior external notes (non-ED) -and- Review of prior external test results outside of this encounter: I independently reviewed the gallbladder ultrasound study from March 14, 2025 which indicated gallstones but no gallbladder wall thickening or pericholecystic fluid or biliary ductal dilatation.    Prescription drug monitoring program review: [facility] reviewed by Dereck Lemus MD, Jason Castillo MD       PAST MEDICAL HISTORY  Active Ambulatory Problems     Diagnosis Date Noted    Type 2 diabetes mellitus with neurologic complication 05/03/2018    Essential hypertension 05/03/2018    Benign prostatic hyperplasia with lower urinary tract symptoms 05/03/2018    Class 3 severe obesity due to excess calories  with serious comorbidity and body mass index (BMI) of 40.0 to 44.9 in adult 05/03/2018    Obstructive sleep apnea treated with auto CPAP 05/03/2018    Abnormal nuclear stress test 05/15/2018    Left leg weakness 01/18/2019     Resolved Ambulatory Problems     Diagnosis Date Noted    Spinal stenosis, lumbar region, with neurogenic claudication 05/03/2018     Past Medical History:   Diagnosis Date    Arthritis     Cancer     Cataract     Chronic kidney disease     Chronic pain disorder     Coronary artery disease     Enlarged prostate     Hard to intubate     History of kidney stones     Injury of back     Low back pain     Lumbosacral disc disease     Lymph edema     Neuropathy     HELEN (obstructive sleep apnea) treated with auto CPAP 04/02/2010    Osteoporosis     Spinal stenosis     Type 2 diabetes mellitus with diabetic neuropathy, with long-term current use of insulin          PAST SURGICAL HISTORY  Past Surgical History:   Procedure Laterality Date    CARDIAC CATHETERIZATION Left 5/17/2018    Procedure: Cardiac Catheterization/Vascular Study;  Surgeon: Kaitlin Barney MD;  Location: [facility] CATH INVASIVE LOCATION;  Service: Cardiovascular    CARDIAC CATHETERIZATION N/A 5/17/2018    Procedure: Stent LACEY coronary;  Surgeon: Kaitlin Barney MD;  Location: [facility] CATH INVASIVE LOCATION;  Service: Cardiovascular    CATARACT EXTRACTION      COLONOSCOPY N/A 4/3/2018    Procedure: COLONOSCOPY TO CECUM;  Surgeon: Royal Metcalf MD;  Location: [facility] ENDOSCOPY;  Service: General    EPIDURAL BLOCK      KIDNEY STONE SURGERY      LUMBAR DISCECTOMY Left 2/27/2019    Procedure: L4-5 lumbar decompression;  Surgeon: Darío Chowdhury MD;  Location: [facility] MAIN OR;  Service: Neurosurgery    SHOULDER ARTHROSCOPY Left     TONSILLECTOMY           FAMILY HISTORY  Family History   Problem Relation Age of Onset    Diabetes Other     Stroke Other     Heart disease Father     Diabetes Maternal Grandmother     Diabetes Maternal  Grandfather     No Known Problems Paternal Grandmother     No Known Problems Paternal Grandfather     Malig Hyperthermia Neg Hx          SOCIAL HISTORY  Social History     Socioeconomic History    Marital status:      Spouse name: Melanie    Number of children: 2    Years of education: 21   Tobacco Use    Smoking status: Never    Smokeless tobacco: Never   Substance and Sexual Activity    Alcohol use: No    Drug use: No    Sexual activity: Defer         ALLERGIES  Ceclor [cefaclor]      REVIEW OF SYSTEMS  Review of Systems  Included in HPI  All systems reviewed and negative except for those discussed in HPI.      PHYSICAL EXAM    I have reviewed the triage vital signs and nursing notes.    ED Triage Vitals [03/16/25 1819]   Temp Heart Rate Resp BP SpO2   98.3 °F (36.8 °C) 93 18 -- 91 %      Temp src Heart Rate Source Patient Position BP Location FiO2 (%)   -- -- -- -- --       Physical Exam  GENERAL: alert, appears uncomfortable but no acute distress  SKIN: Warm, dry  HENT: Normocephalic, atraumatic  EYES: no scleral icterus, normal conjunctivae  CV: regular rhythm, regular rate, normal pulses  RESPIRATORY: normal effort, lungs clear bilaterally, no stridor  ABDOMEN: soft, obese, moderate tenderness in the epigastrium and right upper quadrant regions with palpation.  No rebound or guarding elicited.  No masses are palpable.  Bowel sounds are hypoactive.  MUSCULOSKELETAL: no deformity, no asymmetry of extremities  NEURO: alert, moves all extremities, follows commands      LAB RESULTS  Recent Results (from the past 24 hours)   POC Glucose Once    Collection Time: 03/17/25  5:45 AM    Specimen: Blood   Result Value Ref Range    Glucose 156 (H) 70 - 130 mg/dL   POC Glucose Once    Collection Time: 03/17/25 10:45 AM    Specimen: Blood   Result Value Ref Range    Glucose 153 (H) 70 - 130 mg/dL   POC Glucose Once    Collection Time: 03/17/25  3:49 PM    Specimen: Blood   Result Value Ref Range    Glucose 191 (H) 70 -  130 mg/dL   POC Glucose Once    Collection Time: 03/17/25  8:22 PM    Specimen: Blood   Result Value Ref Range    Glucose 163 (H) 70 - 130 mg/dL         RADIOLOGY  FL Cholangiogram Operative  Result Date: 3/17/2025  INTRAOPERATIVE CHOLANGIOGRAM 03/17/2025  HISTORY: Laparoscopic cholecystectomy with possible stones.  Contrast is seen in the intrahepatic, common hepatic, and common bile ducts. No bile duct stones, strictures or biliary dilatation is seen. There is free spill of contrast into the duodenum.  FLUOROSCOPY TIME:  Seventeen seconds, 28 mGy, 80 images.  This report was finalized on 3/17/2025 1:59 PM by Dr. Bubba Magallon M.D on Workstation: [id number]          MEDICATIONS GIVEN IN ER  Medications   sodium chloride 0.9 % flush 10 mL ( Intravenous MAR Unhold 3/17/25 1620)   dextrose (GLUTOSE) oral gel 15 g ( Oral MAR Unhold 3/17/25 1620)   dextrose (D50W) (25 g/50 mL) IV injection 25 g ( Intravenous MAR Unhold 3/17/25 1620)   glucagon (GLUCAGEN) injection 1 mg ( Intramuscular MAR Unhold 3/17/25 1620)   insulin lispro (HUMALOG/ADMELOG) injection 2-9 Units (2 Units Subcutaneous Given 3/17/25 2054)   HYDROmorphone (DILAUDID) injection 0.5 mg ( Intravenous Return to Cabinet 3/17/25 2126)   ondansetron (ZOFRAN) injection 4 mg (4 mg Intravenous Given 3/17/25 2132)   enoxaparin sodium (LOVENOX) syringe 40 mg (40 mg Subcutaneous Given 3/17/25 2054)   allopurinol (ZYLOPRIM) tablet 100 mg (100 mg Oral Given 3/17/25 2053)   amLODIPine (NORVASC) tablet 10 mg (10 mg Oral Not Given 3/17/25 0916)   aspirin EC tablet 81 mg (81 mg Oral Given 3/17/25 2053)   atorvastatin (LIPITOR) tablet 10 mg (10 mg Oral Not Given 3/17/25 0916)   furosemide (LASIX) tablet 80 mg (80 mg Oral Not Given 3/17/25 0916)   HYDROcodone-acetaminophen (NORCO) 5-325 MG per tablet 1 tablet (has no administration in time range)   insulin glargine (LANTUS, SEMGLEE) injection 80 Units (40 Units Subcutaneous Given 3/17/25 2055)   ketoconazole (NIZORAL) 2 %  cream 1 Application (1 Application Topical Not Given 3/17/25 0955)   latanoprost (XALATAN) 0.005 % ophthalmic solution 1 drop (1 drop Both Eyes Given 3/17/25 2053)   losartan (COZAAR) tablet 100 mg (100 mg Oral Not Given 3/17/25 0916)   metFORMIN (GLUCOPHAGE) tablet 2,000 mg (0 mg Oral Hold 3/17/25 0909)   metoprolol succinate XL (TOPROL-XL) 24 hr tablet 100 mg (100 mg Oral Given 3/17/25 0916)   ondansetron ODT (ZOFRAN-ODT) disintegrating tablet 4 mg (has no administration in time range)   pioglitazone (ACTOS) tablet 45 mg (0 mg Oral Hold 3/17/25 0910)   tamsulosin (FLOMAX) 24 hr capsule 0.8 mg (0.8 mg Oral Given 3/17/25 2053)   sodium chloride 0.9 % flush 10 mL (10 mL Intravenous Given 3/17/25 2054)   sodium chloride 0.9 % flush 10 mL (has no administration in time range)   sodium chloride 0.9 % infusion 40 mL (has no administration in time range)   sodium chloride 0.9 % infusion (100 mL/hr Intravenous Currently Infusing 3/18/25 0109)   acetaminophen (TYLENOL) tablet 650 mg (has no administration in time range)     Or   acetaminophen (TYLENOL) 160 MG/5ML oral solution 650 mg (has no administration in time range)     Or   acetaminophen (TYLENOL) suppository 650 mg (has no administration in time range)   sennosides-docusate (PERICOLACE) 8.6-50 MG per tablet 2 tablet (has no administration in time range)     And   polyethylene glycol (MIRALAX) packet 17 g (has no administration in time range)     And   bisacodyl (DULCOLAX) EC tablet 5 mg (has no administration in time range)     And   bisacodyl (DULCOLAX) suppository 10 mg (has no administration in time range)   HYDROmorphone (DILAUDID) injection 0.5 mg (0.5 mg Intravenous Given 3/18/25 0148)   HYDROcodone-acetaminophen (NORCO) 7.5-325 MG per tablet 1 tablet (1 tablet Oral Given 3/17/25 1640)   naloxone (NARCAN) injection 0.2 mg (has no administration in time range)   ondansetron (ZOFRAN) injection 4 mg (4 mg Intravenous Given 3/16/25 2058)   morphine injection 4 mg  (4 mg Intravenous Given 3/16/25 2057)   piperacillin-tazobactam (ZOSYN) 4.5 g IVPB in 100 mL NS MBP (CD) (4.5 g Intravenous New Bag 3/16/25 2251)   ondansetron (ZOFRAN) injection 4 mg (4 mg Intravenous Given 3/16/25 2152)   morphine injection 4 mg (4 mg Intravenous Given 3/16/25 2153)   sodium chloride 0.9 % bolus 1,000 mL (0 mL Intravenous Stopped 3/17/25 0144)   iopamidol (ISOVUE-300) 61 % injection 100 mL (85 mL Intravenous Given 3/16/25 2330)   fentaNYL citrate (PF) (SUBLIMAZE) injection 50 mcg (50 mcg Intravenous Given 3/17/25 1055)   famotidine (PEPCID) injection 20 mg (20 mg Intravenous Given 3/17/25 1055)   indocyanine green (IC-GREEN) injection 2.5 mg (2.5 mg Intravenous Given 3/17/25 1117)   ipratropium-albuterol (DUO-NEB) nebulizer solution 3 mL (3 mL Nebulization Given 3/18/25 0115)         ORDERS PLACED DURING THIS VISIT:  Orders Placed This Encounter   Procedures    Blood Culture - Blood,    Blood Culture - Blood,    CT Abdomen Pelvis With Contrast    FL Cholangiogram Operative    Comprehensive Metabolic Panel    Lipase    Urinalysis With Microscopic If Indicated (No Culture) - Urine, Clean Catch    CBC Auto Differential    High Sensitivity Troponin T    Lactic Acid, Plasma    High Sensitivity Troponin T 1Hr    Comprehensive Metabolic Panel    CBC Auto Differential    Lipid Panel    Magnesium    Basic Metabolic Panel    Hemoglobin A1c    Diet: Liquid, Diabetic; Clear Liquid; Consistent Carbohydrate; Fluid Consistency: Thin (IDDSI 0)    Opioid Administration - Document EtCO2 and / or SpO2 With Each Set of Vitals & Any Change in Patient Status    Opioid Administration - Notify Provider Hypercapnic Monitoring    Place Sequential Compression Device    Maintain Sequential Compression Device    Vital Signs    Notify Physician (with Parameters)    Activity - Ad Zaynab    Up in Chair    Intake & Output    Weigh patient    Oral Care    Place Sequential Compression Device    Continuous Pulse Oximetry    Vital signs  every 5 minutes for 15 minutes, every 15 minutes thereafter.    Call Anesthesiologist for additional IV Fluid bolus for Hypotension/Tachycardia    Notify Anesthesia of Any Acute Changes in Patient Condition    Notify Anesthesia for Unrelieved Pain    Once DC criteria to floor met, follow surgeon's orders.    Discharge patient from PACU when discharge criteria is met.    Code Status and Medical Interventions: CPR (Attempt to Resuscitate); Full Support    IP General Consult (Use specialty-specific consult if known)    Inpatient General Surgery Consult    Inpatient consult to Case Management     PT Consult: Eval & Treat as tolerated    Oxygen Therapy- Nasal Cannula; Titrate 1-6 LPM Per SpO2; 90 - 95%    Incentive Spirometry    Oxygen Therapy- Nasal Cannula; Titrate 1-6 LPM Per SpO2; 90 - 95%    Oxygen Therapy- Nasal Cannula; Titrate 1-6 LPM Per SpO2; 90 - 95%    Oxygen Therapy- Nasal Cannula; Titrate 1-6 LPM Per SpO2; 90 - 95%    POC Glucose 4x Daily Before Meals & at Bedtime    POC Glucose Once    POC Glucose 4x Daily Before Meals & at Bedtime    POC Glucose Once    POC Glucose Once    POC Glucose Once    POC Glucose Once    Telemetry Scan    Telemetry Scan    Insert Peripheral IV    Insert Peripheral IV    Initiate Observation Status    CBC & Differential    CBC & Differential         OUTPATIENT MEDICATION MANAGEMENT:  Current Facility-Administered Medications Ordered in Epic   Medication Dose Route Frequency Provider Last Rate Last Admin    acetaminophen (TYLENOL) tablet 650 mg  650 mg Oral Q4H PRN Glen Pimentel MD        Or    acetaminophen (TYLENOL) 160 MG/5ML oral solution 650 mg  650 mg Oral Q4H PRN Glen Pimentel MD        Or    acetaminophen (TYLENOL) suppository 650 mg  650 mg Rectal Q4H PRN Glen Pimentel MD        allopurinol (ZYLOPRIM) tablet 100 mg  100 mg Oral Nightly Glen Pimentel MD   100 mg at 03/17/25 2053    amLODIPine (NORVASC) tablet 10 mg  10 mg Oral Q24H Glen Pimentel  MD        aspirin EC tablet 81 mg  81 mg Oral Nightly Glen Pimentel MD   81 mg at 03/17/25 2053    atorvastatin (LIPITOR) tablet 10 mg  10 mg Oral Daily Glen Pimentel MD        sennosides-docusate (PERICOLACE) 8.6-50 MG per tablet 2 tablet  2 tablet Oral BID PRN Glen Pimentel MD        And    polyethylene glycol (MIRALAX) packet 17 g  17 g Oral Daily PRN Glen Pimentel MD        And    bisacodyl (DULCOLAX) EC tablet 5 mg  5 mg Oral Daily PRN Glen Pimentel MD        And    bisacodyl (DULCOLAX) suppository 10 mg  10 mg Rectal Daily PRN Glen Pimentel MD        dextrose (D50W) (25 g/50 mL) IV injection 25 g  25 g Intravenous Q15 Min PRN Glen Pimentel MD        dextrose (GLUTOSE) oral gel 15 g  15 g Oral Q15 Min PRN Glen Pimentel MD        enoxaparin sodium (LOVENOX) syringe 40 mg  40 mg Subcutaneous BID Glen Pimentel MD   40 mg at 03/17/25 2054    furosemide (LASIX) tablet 80 mg  80 mg Oral Daily Glen Pimentel MD        glucagon (GLUCAGEN) injection 1 mg  1 mg Intramuscular Q15 Min PRN Glen Pimentel MD        HYDROcodone-acetaminophen (NORCO) 5-325 MG per tablet 1 tablet  1 tablet Oral Q6H PRN Glen Pimentel MD        HYDROcodone-acetaminophen (NORCO) 7.5-325 MG per tablet 1 tablet  1 tablet Oral Q4H PRN Glen iPmentel MD   1 tablet at 03/17/25 1640    HYDROmorphone (DILAUDID) injection 0.5 mg  0.5 mg Intravenous Q4H PRN Glen Pimentel MD   0.5 mg at 03/17/25 0741    HYDROmorphone (DILAUDID) injection 0.5 mg  0.5 mg Intravenous Q2H PRN Glen Pimentel MD   0.5 mg at 03/18/25 0148    insulin glargine (LANTUS, SEMGLEE) injection 80 Units  80 Units Subcutaneous Nightly Glen Pimentel MD   40 Units at 03/17/25 2055    insulin lispro (HUMALOG/ADMELOG) injection 2-9 Units  2-9 Units Subcutaneous 4x Daily AC & at Bedtime Glen Pimentel MD   2 Units at 03/17/25 2054    ketoconazole (NIZORAL) 2 % cream 1 Application  1 Application Topical Daily Glen Pimentel MD        latanoprost  (XALATAN) 0.005 % ophthalmic solution 1 drop  1 drop Both Eyes Nightly Glen Pimentel MD   1 drop at 03/17/25 2053    losartan (COZAAR) tablet 100 mg  100 mg Oral Daily Glen Pimentel MD        metFORMIN (GLUCOPHAGE) tablet 2,000 mg  2,000 mg Oral Daily With Breakfast Glen Pimentel MD        metoprolol succinate XL (TOPROL-XL) 24 hr tablet 100 mg  100 mg Oral Daily Glen Pimentel MD   100 mg at 03/17/25 0916    naloxone (NARCAN) injection 0.2 mg  0.2 mg Intravenous PRN Glen Pimentel MD        ondansetron (ZOFRAN) injection 4 mg  4 mg Intravenous Q4H PRN Glen Pimentel MD   4 mg at 03/17/25 2132    ondansetron ODT (ZOFRAN-ODT) disintegrating tablet 4 mg  4 mg Translingual 4x Daily PRN Glen Pimentel MD        pioglitazone (ACTOS) tablet 45 mg  45 mg Oral Daily Glen Pimentel MD        sodium chloride 0.9 % flush 10 mL  10 mL Intravenous PRN Glen Pimentel MD        sodium chloride 0.9 % flush 10 mL  10 mL Intravenous Q12H Glen Pimentel MD   10 mL at 03/17/25 2054    sodium chloride 0.9 % flush 10 mL  10 mL Intravenous PRN Glen Pimentel MD        sodium chloride 0.9 % infusion 40 mL  40 mL Intravenous PRN Glen Pimentel MD        sodium chloride 0.9 % infusion  100 mL/hr Intravenous Continuous Glen Pimentel  mL/hr at 03/18/25 0109 100 mL/hr at 03/18/25 0109    tamsulosin (FLOMAX) 24 hr capsule 0.8 mg  0.8 mg Oral Nightly Glen Pimentel MD   0.8 mg at 03/17/25 2053     No current [facility]-ordered outpatient medications on file.         PROCEDURES  Procedures        PROGRESS, DATA ANALYSIS, CONSULTS, AND MEDICAL DECISION MAKING  All labs have been independently interpreted by me.  All radiology studies have been reviewed by me. All EKG's have been independently viewed and interpreted by me.  Discussion below represents my analysis of pertinent findings related to patient's condition, differential diagnosis, treatment plan and final disposition.    Differential diagnosis includes but is  not limited to gastritis, peptic ulcer disease, hiatal hernia, biliary colic, pancreatitis, IBS, cholecystitis.    Clinical Scores:                   ED Course as of 03/18/25 0402   Sun Mar 16, 2025   1932 Patient had recent CT imaging and ultrasound imaging of the gallbladder.  At this time I do not think we need to repeat those studies.  He is afebrile.  Not actively vomiting.  We will check some basic labs including CMP to make sure that his liver function tests remain normal in comparison to the workup from 2 days ago.  Then will contact his PCP for further recommendations regarding disposition planning. [BINU]   2049 Patient with extremely difficult IV access.  Given delay in obtaining labs and IV access, will order dose of Zofran ODT and IM morphine. [MP]   2116 WBC(!): 16.10 [MP]   2116 Hemoglobin(!): 12.0 [MP]   2130 BUN(!): 27 [MP]   2130 Creatinine: 1.25 [MP]   2204 Discussed with Dr. Lemus.  Reviewed patient presentation and ED findings.  He requests stat CT of the abdomen and pelvis.  Request we give liter of normal saline.  He agrees admit patient to a telemetry bed. [MP]   Tue Mar 18, 2025   0357 HS Troponin T: 19 [BINU]   0357 Troponin T Numeric Delta(!!): 3 [BINU]   0358 WBC(!): 16.10 [BINU]   0359 ALT (SGPT): 8 [BINU]   0359 AST (SGOT): 14 [BINU]   0359 Total Bilirubin: 0.6 [BINU]   0359 I independently interpreted the CT abdomen and pelvis study and my findings are: No bowel obstruction.  Gallbladder shows gallstones with some pericholecystic stranding changes notable [BINU]   0401 White blood cell count is elevated.  Radiographic changes of stranding are noted around the gallbladder.  We started therapy with IV Zosyn for concern of acute cholecystitis [BINU]      ED Course User Index  [BINU] Jason Castillo MD  [MP] Rosanne Phillip PA-C             AS OF 04:02 EDT VITALS:    BP - 136/53  HR - 73  TEMP - 97.9 °F (36.6 °C) (Oral)  O2 SATS - (!) 86%    COMPLEXITY OF CARE  The patient requires  admission.      DIAGNOSIS  Final diagnoses:   Epigastric pain   Gallstones   Cholecystitis         DISPOSITION  ED Disposition       ED Disposition   Decision to Admit    Condition   --    Comment   Level of Care: Telemetry [5]   Diagnosis: Epigastric pain [535131]   Admitting Physician: CHEN ADAMS [[id number]]   Attending Physician: CHEN ADAMS [[id number]]   Bed Request Comments: [facility], not [facility] per admitting provider   Is patient appropriate for Inpatient Observation Unit?: No [0]                  Please note that portions of this document were completed with a voice recognition program.    Note Disclaimer: At [facility], we believe that sharing information builds trust and better relationships. You are receiving this note because you recently visited [facility]. It is possible you will see health information before a provider has talked with you about it. This kind of information can be easy to misunderstand. To help you fully understand what it means for your health, we urge you to discuss this note with your provider.         Jason Castillo MD  03/18/25 0402

## 2025-03-16 NOTE — TELEPHONE ENCOUNTER
"Woke up hour ago drenched in sweat. Reported is a diabetic- checked blood sugar on phone was 170  Denied chest pain  Denied difficulty breathing  Reported unsteady when stands up, stood and walked while talking, stated was some better  Denied abd pain or n/v  /61, P 76    Reason for Disposition   [1] SEVERE sweating (e.g., drenched with sweat) AND [2] cause unknown    Additional Information   Negative: SEVERE difficulty breathing (e.g., struggling for each breath, speaks in single words)   Negative: Shock suspected (e.g., cold/pale/clammy skin, too weak to stand, low BP, rapid pulse)   Negative: Sounds like a life-threatening emergency to the triager   Negative: Fever   Negative: Chest pain or cardiac ischemia is suspected (e.g., unexplained visible sweat on face)   Negative: Weakness   Negative: Dizziness and lightheadedness (e.g., feeling woozy, feeling like they might faint)   Negative: Heat exhaustion suspected (i.e., dehydration from heat exposure)   Negative: [1] Diabetes mellitus AND [2] sweating from low blood glucose (i.e., 70 mg/dl [3.9 mmol/l] or below)   Negative: Difficulty breathing   Negative: Patient sounds very sick or weak to the triager    Answer Assessment - Initial Assessment Questions  1. ONSET: \"When did the sweating start?\"       Just woke up drenched in sweat, reported had 2 other episodes yesterday but wasn't as bad  2. LOCATION: \"What part of your body has excessive sweating?\" (e.g., entire body; just face, underarms, palms, or soles of feet).       Stated all over  3. SEVERITY: \"How bad is the sweating?\"    (Scale 1-10; or mild, moderate, severe)    -  MILD (1-3): Sweating doesn't interfere with normal activities.     -  MODERATE (4-7): Sweating interferes with normal activities (e.g., work or school) or awakens from sleep; causes embarrassment in social situations.     -  SEVERE (8-10): Drenching sweat and has to change bed clothes or bed linens.    - NIGHT SWEATS: Drenching sweat " "that occurs at night and has to change bed clothes or bed sheets.      Severe  4. CAUSE: \"What do you think is causing the sweating?\"      Not sure, seen in ER on 14th wondered if could be from meds given  5. FEVER: \"Have you been having fevers?\" If Yes, ask: \"What is your temperature, how was it measured, and when did it start?\"      denied  6. OTHER SYMPTOMS: \"Do you have any other symptoms?\" (e.g., chest pain, difficulty breathing, lightheadedness, weight loss)      Reported unsteady gait    Protocols used: Sweating-ADULT-AH    "

## 2025-03-17 ENCOUNTER — APPOINTMENT (OUTPATIENT)
Dept: GENERAL RADIOLOGY | Facility: HOSPITAL | Age: 80
End: 2025-03-17
Payer: MEDICARE

## 2025-03-17 ENCOUNTER — ANESTHESIA (OUTPATIENT)
Dept: PERIOP | Facility: HOSPITAL | Age: 80
End: 2025-03-17
Payer: MEDICARE

## 2025-03-17 ENCOUNTER — ANESTHESIA EVENT (OUTPATIENT)
Dept: PERIOP | Facility: HOSPITAL | Age: 80
End: 2025-03-17
Payer: MEDICARE

## 2025-03-17 PROBLEM — K81.0 ACUTE CHOLECYSTITIS: Status: ACTIVE | Noted: 2025-03-17

## 2025-03-17 PROBLEM — E78.00 HIGH CHOLESTEROL: Status: ACTIVE | Noted: 2025-03-17

## 2025-03-17 PROBLEM — M51.9 LUMBAR DISC DISEASE: Status: ACTIVE | Noted: 2025-03-17

## 2025-03-17 LAB
GLUCOSE BLDC GLUCOMTR-MCNC: 153 MG/DL (ref 70–130)
GLUCOSE BLDC GLUCOMTR-MCNC: 156 MG/DL (ref 70–130)
GLUCOSE BLDC GLUCOMTR-MCNC: 163 MG/DL (ref 70–130)
GLUCOSE BLDC GLUCOMTR-MCNC: 191 MG/DL (ref 70–130)

## 2025-03-17 PROCEDURE — 25010000002 ONDANSETRON PER 1 MG: Performed by: STUDENT IN AN ORGANIZED HEALTH CARE EDUCATION/TRAINING PROGRAM

## 2025-03-17 PROCEDURE — 25010000002 PHENYLEPHRINE 10 MG/ML SOLUTION: Performed by: ANESTHESIOLOGY

## 2025-03-17 PROCEDURE — 25010000002 PROPOFOL 10 MG/ML EMULSION: Performed by: ANESTHESIOLOGY

## 2025-03-17 PROCEDURE — 25510000002 IOTHALAMATE 60 % SOLUTION: Performed by: STUDENT IN AN ORGANIZED HEALTH CARE EDUCATION/TRAINING PROGRAM

## 2025-03-17 PROCEDURE — 25010000002 SUGAMMADEX 200 MG/2ML SOLUTION

## 2025-03-17 PROCEDURE — 63710000001 INSULIN GLARGINE PER 5 UNITS: Performed by: STUDENT IN AN ORGANIZED HEALTH CARE EDUCATION/TRAINING PROGRAM

## 2025-03-17 PROCEDURE — 25010000002 PIPERACILLIN SOD-TAZOBACTAM PER 1 G: Performed by: INTERNAL MEDICINE

## 2025-03-17 PROCEDURE — 0FT44ZZ RESECTION OF GALLBLADDER, PERCUTANEOUS ENDOSCOPIC APPROACH: ICD-10-PCS | Performed by: STUDENT IN AN ORGANIZED HEALTH CARE EDUCATION/TRAINING PROGRAM

## 2025-03-17 PROCEDURE — 25010000002 ONDANSETRON PER 1 MG: Performed by: INTERNAL MEDICINE

## 2025-03-17 PROCEDURE — 74300 X-RAY BILE DUCTS/PANCREAS: CPT

## 2025-03-17 PROCEDURE — 25010000002 ESMOLOL 100 MG/10ML SOLUTION: Performed by: ANESTHESIOLOGY

## 2025-03-17 PROCEDURE — 25810000003 LACTATED RINGERS PER 1000 ML: Performed by: ANESTHESIOLOGY

## 2025-03-17 PROCEDURE — 25010000002 ACETAMINOPHEN 10 MG/ML SOLUTION: Performed by: ANESTHESIOLOGY

## 2025-03-17 PROCEDURE — BF101ZZ FLUOROSCOPY OF BILE DUCTS USING LOW OSMOLAR CONTRAST: ICD-10-PCS | Performed by: STUDENT IN AN ORGANIZED HEALTH CARE EDUCATION/TRAINING PROGRAM

## 2025-03-17 PROCEDURE — 25010000002 ONDANSETRON PER 1 MG: Performed by: ANESTHESIOLOGY

## 2025-03-17 PROCEDURE — 25810000003 SODIUM CHLORIDE 0.9 % SOLUTION: Performed by: PHYSICIAN ASSISTANT

## 2025-03-17 PROCEDURE — 25810000003 SODIUM CHLORIDE 0.9 % SOLUTION: Performed by: STUDENT IN AN ORGANIZED HEALTH CARE EDUCATION/TRAINING PROGRAM

## 2025-03-17 PROCEDURE — 99223 1ST HOSP IP/OBS HIGH 75: CPT | Performed by: STUDENT IN AN ORGANIZED HEALTH CARE EDUCATION/TRAINING PROGRAM

## 2025-03-17 PROCEDURE — 25010000002 HYDROMORPHONE PER 4 MG: Performed by: INTERNAL MEDICINE

## 2025-03-17 PROCEDURE — 25010000002 LIDOCAINE 2% SOLUTION: Performed by: ANESTHESIOLOGY

## 2025-03-17 PROCEDURE — 88304 TISSUE EXAM BY PATHOLOGIST: CPT | Performed by: STUDENT IN AN ORGANIZED HEALTH CARE EDUCATION/TRAINING PROGRAM

## 2025-03-17 PROCEDURE — 82948 REAGENT STRIP/BLOOD GLUCOSE: CPT

## 2025-03-17 PROCEDURE — 25010000002 FENTANYL CITRATE (PF) 50 MCG/ML SOLUTION: Performed by: ANESTHESIOLOGY

## 2025-03-17 PROCEDURE — G0378 HOSPITAL OBSERVATION PER HR: HCPCS

## 2025-03-17 PROCEDURE — 25010000002 SUCCINYLCHOLINE PER 20 MG: Performed by: ANESTHESIOLOGY

## 2025-03-17 PROCEDURE — 25010000002 ENOXAPARIN PER 10 MG: Performed by: STUDENT IN AN ORGANIZED HEALTH CARE EDUCATION/TRAINING PROGRAM

## 2025-03-17 PROCEDURE — 47563 LAPARO CHOLECYSTECTOMY/GRAPH: CPT | Performed by: STUDENT IN AN ORGANIZED HEALTH CARE EDUCATION/TRAINING PROGRAM

## 2025-03-17 PROCEDURE — 47563 LAPARO CHOLECYSTECTOMY/GRAPH: CPT | Performed by: SPECIALIST/TECHNOLOGIST, OTHER

## 2025-03-17 PROCEDURE — 25010000002 HYDROMORPHONE PER 4 MG: Performed by: STUDENT IN AN ORGANIZED HEALTH CARE EDUCATION/TRAINING PROGRAM

## 2025-03-17 PROCEDURE — 25010000002 INDOCYANINE GREEN 25 MG RECONSTITUTED SOLUTION: Performed by: STUDENT IN AN ORGANIZED HEALTH CARE EDUCATION/TRAINING PROGRAM

## 2025-03-17 PROCEDURE — 25810000003 SODIUM CHLORIDE PER 500 ML: Performed by: STUDENT IN AN ORGANIZED HEALTH CARE EDUCATION/TRAINING PROGRAM

## 2025-03-17 PROCEDURE — 63710000001 INSULIN LISPRO (HUMAN) PER 5 UNITS: Performed by: STUDENT IN AN ORGANIZED HEALTH CARE EDUCATION/TRAINING PROGRAM

## 2025-03-17 DEVICE — HORIZON TI ML 6 CLIPS/CART
Type: IMPLANTABLE DEVICE | Site: ABDOMEN | Status: FUNCTIONAL
Brand: WECK

## 2025-03-17 RX ORDER — ATROPINE SULFATE 0.4 MG/ML
0.4 INJECTION, SOLUTION INTRAMUSCULAR; INTRAVENOUS; SUBCUTANEOUS ONCE AS NEEDED
Status: DISCONTINUED | OUTPATIENT
Start: 2025-03-17 | End: 2025-03-17 | Stop reason: ALTCHOICE

## 2025-03-17 RX ORDER — ESMOLOL HYDROCHLORIDE 10 MG/ML
INJECTION INTRAVENOUS AS NEEDED
Status: DISCONTINUED | OUTPATIENT
Start: 2025-03-17 | End: 2025-03-17 | Stop reason: SURG

## 2025-03-17 RX ORDER — SODIUM CHLORIDE 0.9 % (FLUSH) 0.9 %
3-10 SYRINGE (ML) INJECTION AS NEEDED
Status: DISCONTINUED | OUTPATIENT
Start: 2025-03-17 | End: 2025-03-17 | Stop reason: HOSPADM

## 2025-03-17 RX ORDER — KETOCONAZOLE 20 MG/G
1 CREAM TOPICAL DAILY
COMMUNITY
Start: 2025-02-11

## 2025-03-17 RX ORDER — FENTANYL CITRATE 50 UG/ML
INJECTION, SOLUTION INTRAMUSCULAR; INTRAVENOUS AS NEEDED
Status: DISCONTINUED | OUTPATIENT
Start: 2025-03-17 | End: 2025-03-17 | Stop reason: SURG

## 2025-03-17 RX ORDER — DROPERIDOL 2.5 MG/ML
0.62 INJECTION, SOLUTION INTRAMUSCULAR; INTRAVENOUS
Status: DISCONTINUED | OUTPATIENT
Start: 2025-03-17 | End: 2025-03-17 | Stop reason: ALTCHOICE

## 2025-03-17 RX ORDER — IBUPROFEN 600 MG/1
1 TABLET ORAL
Status: DISCONTINUED | OUTPATIENT
Start: 2025-03-17 | End: 2025-03-20 | Stop reason: HOSPADM

## 2025-03-17 RX ORDER — FAMOTIDINE 10 MG/ML
20 INJECTION, SOLUTION INTRAVENOUS ONCE
Status: COMPLETED | OUTPATIENT
Start: 2025-03-17 | End: 2025-03-17

## 2025-03-17 RX ORDER — PROPOFOL 10 MG/ML
VIAL (ML) INTRAVENOUS AS NEEDED
Status: DISCONTINUED | OUTPATIENT
Start: 2025-03-17 | End: 2025-03-17 | Stop reason: SURG

## 2025-03-17 RX ORDER — HYDROCODONE BITARTRATE AND ACETAMINOPHEN 7.5; 325 MG/1; MG/1
1 TABLET ORAL EVERY 4 HOURS PRN
Status: DISCONTINUED | OUTPATIENT
Start: 2025-03-17 | End: 2025-03-18

## 2025-03-17 RX ORDER — HYDROMORPHONE HYDROCHLORIDE 1 MG/ML
0.5 INJECTION, SOLUTION INTRAMUSCULAR; INTRAVENOUS; SUBCUTANEOUS EVERY 4 HOURS PRN
Status: DISCONTINUED | OUTPATIENT
Start: 2025-03-17 | End: 2025-03-18

## 2025-03-17 RX ORDER — SODIUM CHLORIDE, SODIUM LACTATE, POTASSIUM CHLORIDE, CALCIUM CHLORIDE 600; 310; 30; 20 MG/100ML; MG/100ML; MG/100ML; MG/100ML
INJECTION, SOLUTION INTRAVENOUS CONTINUOUS PRN
Status: DISCONTINUED | OUTPATIENT
Start: 2025-03-17 | End: 2025-03-17 | Stop reason: SURG

## 2025-03-17 RX ORDER — LIDOCAINE HYDROCHLORIDE 10 MG/ML
0.5 INJECTION, SOLUTION INFILTRATION; PERINEURAL ONCE AS NEEDED
Status: DISCONTINUED | OUTPATIENT
Start: 2025-03-17 | End: 2025-03-17 | Stop reason: HOSPADM

## 2025-03-17 RX ORDER — IPRATROPIUM BROMIDE AND ALBUTEROL SULFATE 2.5; .5 MG/3ML; MG/3ML
3 SOLUTION RESPIRATORY (INHALATION) ONCE AS NEEDED
Status: COMPLETED | OUTPATIENT
Start: 2025-03-17 | End: 2025-03-18

## 2025-03-17 RX ORDER — HYDRALAZINE HYDROCHLORIDE 20 MG/ML
5 INJECTION INTRAMUSCULAR; INTRAVENOUS
Status: DISCONTINUED | OUTPATIENT
Start: 2025-03-17 | End: 2025-03-17 | Stop reason: ALTCHOICE

## 2025-03-17 RX ORDER — LATANOPROST 50 UG/ML
1 SOLUTION/ DROPS OPHTHALMIC NIGHTLY
Status: DISCONTINUED | OUTPATIENT
Start: 2025-03-17 | End: 2025-03-20 | Stop reason: HOSPADM

## 2025-03-17 RX ORDER — BISACODYL 5 MG/1
5 TABLET, DELAYED RELEASE ORAL DAILY PRN
Status: DISCONTINUED | OUTPATIENT
Start: 2025-03-17 | End: 2025-03-18

## 2025-03-17 RX ORDER — INDOCYANINE GREEN AND WATER 25 MG
2.5 KIT INJECTION ONCE
Status: DISCONTINUED | OUTPATIENT
Start: 2025-03-17 | End: 2025-03-17

## 2025-03-17 RX ORDER — PHENYLEPHRINE HYDROCHLORIDE 10 MG/ML
INJECTION INTRAVENOUS AS NEEDED
Status: DISCONTINUED | OUTPATIENT
Start: 2025-03-17 | End: 2025-03-17 | Stop reason: SURG

## 2025-03-17 RX ORDER — ASPIRIN 81 MG/1
81 TABLET ORAL NIGHTLY
Status: DISCONTINUED | OUTPATIENT
Start: 2025-03-17 | End: 2025-03-20 | Stop reason: HOSPADM

## 2025-03-17 RX ORDER — ACETAMINOPHEN 325 MG/1
650 TABLET ORAL EVERY 4 HOURS PRN
Status: DISCONTINUED | OUTPATIENT
Start: 2025-03-17 | End: 2025-03-18

## 2025-03-17 RX ORDER — HYDROCODONE BITARTRATE AND ACETAMINOPHEN 5; 325 MG/1; MG/1
1 TABLET ORAL EVERY 6 HOURS PRN
Refills: 0 | Status: DISCONTINUED | OUTPATIENT
Start: 2025-03-17 | End: 2025-03-18

## 2025-03-17 RX ORDER — ACETAMINOPHEN 10 MG/ML
INJECTION, SOLUTION INTRAVENOUS AS NEEDED
Status: DISCONTINUED | OUTPATIENT
Start: 2025-03-17 | End: 2025-03-17 | Stop reason: SURG

## 2025-03-17 RX ORDER — ONDANSETRON 2 MG/ML
4 INJECTION INTRAMUSCULAR; INTRAVENOUS ONCE AS NEEDED
Status: DISCONTINUED | OUTPATIENT
Start: 2025-03-17 | End: 2025-03-17 | Stop reason: ALTCHOICE

## 2025-03-17 RX ORDER — BUPIVACAINE HYDROCHLORIDE AND EPINEPHRINE 5; 5 MG/ML; UG/ML
INJECTION, SOLUTION PERINEURAL AS NEEDED
Status: DISCONTINUED | OUTPATIENT
Start: 2025-03-17 | End: 2025-03-17 | Stop reason: HOSPADM

## 2025-03-17 RX ORDER — SUCCINYLCHOLINE CHLORIDE 20 MG/ML
INJECTION INTRAMUSCULAR; INTRAVENOUS AS NEEDED
Status: DISCONTINUED | OUTPATIENT
Start: 2025-03-17 | End: 2025-03-17 | Stop reason: SURG

## 2025-03-17 RX ORDER — ONDANSETRON 2 MG/ML
4 INJECTION INTRAMUSCULAR; INTRAVENOUS EVERY 4 HOURS PRN
Status: DISCONTINUED | OUTPATIENT
Start: 2025-03-17 | End: 2025-03-20 | Stop reason: HOSPADM

## 2025-03-17 RX ORDER — INSULIN GLARGINE 100 [IU]/ML
80 INJECTION, SOLUTION SUBCUTANEOUS NIGHTLY
Status: DISCONTINUED | OUTPATIENT
Start: 2025-03-17 | End: 2025-03-20 | Stop reason: HOSPADM

## 2025-03-17 RX ORDER — SODIUM CHLORIDE, SODIUM LACTATE, POTASSIUM CHLORIDE, CALCIUM CHLORIDE 600; 310; 30; 20 MG/100ML; MG/100ML; MG/100ML; MG/100ML
9 INJECTION, SOLUTION INTRAVENOUS CONTINUOUS
Status: DISCONTINUED | OUTPATIENT
Start: 2025-03-17 | End: 2025-03-17

## 2025-03-17 RX ORDER — MIDAZOLAM HYDROCHLORIDE 1 MG/ML
0.5 INJECTION, SOLUTION INTRAMUSCULAR; INTRAVENOUS
Status: DISCONTINUED | OUTPATIENT
Start: 2025-03-17 | End: 2025-03-17 | Stop reason: HOSPADM

## 2025-03-17 RX ORDER — INSULIN LISPRO 100 [IU]/ML
2-9 INJECTION, SOLUTION INTRAVENOUS; SUBCUTANEOUS
Status: DISCONTINUED | OUTPATIENT
Start: 2025-03-17 | End: 2025-03-20 | Stop reason: HOSPADM

## 2025-03-17 RX ORDER — SODIUM CHLORIDE 0.9 % (FLUSH) 0.9 %
10 SYRINGE (ML) INJECTION AS NEEDED
Status: DISCONTINUED | OUTPATIENT
Start: 2025-03-17 | End: 2025-03-20

## 2025-03-17 RX ORDER — PROMETHAZINE HYDROCHLORIDE 25 MG/1
25 SUPPOSITORY RECTAL ONCE AS NEEDED
Status: DISCONTINUED | OUTPATIENT
Start: 2025-03-17 | End: 2025-03-17 | Stop reason: ALTCHOICE

## 2025-03-17 RX ORDER — SODIUM CHLORIDE 9 MG/ML
40 INJECTION, SOLUTION INTRAVENOUS AS NEEDED
Status: DISCONTINUED | OUTPATIENT
Start: 2025-03-17 | End: 2025-03-20

## 2025-03-17 RX ORDER — DIPHENHYDRAMINE HYDROCHLORIDE 50 MG/ML
12.5 INJECTION, SOLUTION INTRAMUSCULAR; INTRAVENOUS
Status: DISCONTINUED | OUTPATIENT
Start: 2025-03-17 | End: 2025-03-17 | Stop reason: ALTCHOICE

## 2025-03-17 RX ORDER — SODIUM CHLORIDE 9 MG/ML
100 INJECTION, SOLUTION INTRAVENOUS CONTINUOUS
Status: DISCONTINUED | OUTPATIENT
Start: 2025-03-17 | End: 2025-03-17

## 2025-03-17 RX ORDER — BISACODYL 10 MG
10 SUPPOSITORY, RECTAL RECTAL DAILY PRN
Status: DISCONTINUED | OUTPATIENT
Start: 2025-03-17 | End: 2025-03-18

## 2025-03-17 RX ORDER — SODIUM CHLORIDE 0.9 % (FLUSH) 0.9 %
3 SYRINGE (ML) INJECTION EVERY 12 HOURS SCHEDULED
Status: DISCONTINUED | OUTPATIENT
Start: 2025-03-17 | End: 2025-03-17 | Stop reason: HOSPADM

## 2025-03-17 RX ORDER — ALLOPURINOL 100 MG/1
100 TABLET ORAL NIGHTLY
Status: DISCONTINUED | OUTPATIENT
Start: 2025-03-17 | End: 2025-03-20 | Stop reason: HOSPADM

## 2025-03-17 RX ORDER — DEXTROSE MONOHYDRATE 25 G/50ML
25 INJECTION, SOLUTION INTRAVENOUS
Status: DISCONTINUED | OUTPATIENT
Start: 2025-03-17 | End: 2025-03-20 | Stop reason: HOSPADM

## 2025-03-17 RX ORDER — FENTANYL CITRATE 50 UG/ML
50 INJECTION, SOLUTION INTRAMUSCULAR; INTRAVENOUS ONCE AS NEEDED
Status: COMPLETED | OUTPATIENT
Start: 2025-03-17 | End: 2025-03-17

## 2025-03-17 RX ORDER — KETOCONAZOLE 20 MG/G
1 CREAM TOPICAL DAILY
Status: DISCONTINUED | OUTPATIENT
Start: 2025-03-17 | End: 2025-03-20 | Stop reason: HOSPADM

## 2025-03-17 RX ORDER — ONDANSETRON 4 MG/1
4 TABLET, ORALLY DISINTEGRATING ORAL 4 TIMES DAILY PRN
Status: DISCONTINUED | OUTPATIENT
Start: 2025-03-17 | End: 2025-03-20 | Stop reason: HOSPADM

## 2025-03-17 RX ORDER — AMOXICILLIN 250 MG
2 CAPSULE ORAL 2 TIMES DAILY PRN
Status: DISCONTINUED | OUTPATIENT
Start: 2025-03-17 | End: 2025-03-18

## 2025-03-17 RX ORDER — NALOXONE HCL 0.4 MG/ML
0.2 VIAL (ML) INJECTION AS NEEDED
Status: DISCONTINUED | OUTPATIENT
Start: 2025-03-17 | End: 2025-03-20 | Stop reason: HOSPADM

## 2025-03-17 RX ORDER — HYDROMORPHONE HYDROCHLORIDE 1 MG/ML
0.5 INJECTION, SOLUTION INTRAMUSCULAR; INTRAVENOUS; SUBCUTANEOUS
Refills: 0 | Status: DISCONTINUED | OUTPATIENT
Start: 2025-03-17 | End: 2025-03-18

## 2025-03-17 RX ORDER — EPHEDRINE SULFATE 50 MG/ML
5 INJECTION, SOLUTION INTRAVENOUS ONCE AS NEEDED
Status: DISCONTINUED | OUTPATIENT
Start: 2025-03-17 | End: 2025-03-17 | Stop reason: ALTCHOICE

## 2025-03-17 RX ORDER — LABETALOL HYDROCHLORIDE 5 MG/ML
5 INJECTION, SOLUTION INTRAVENOUS
Status: DISCONTINUED | OUTPATIENT
Start: 2025-03-17 | End: 2025-03-17 | Stop reason: ALTCHOICE

## 2025-03-17 RX ORDER — SODIUM CHLORIDE 0.9 % (FLUSH) 0.9 %
10 SYRINGE (ML) INJECTION EVERY 12 HOURS SCHEDULED
Status: DISCONTINUED | OUTPATIENT
Start: 2025-03-17 | End: 2025-03-20

## 2025-03-17 RX ORDER — HYDROCODONE BITARTRATE AND ACETAMINOPHEN 5; 325 MG/1; MG/1
1 TABLET ORAL ONCE AS NEEDED
Status: DISCONTINUED | OUTPATIENT
Start: 2025-03-17 | End: 2025-03-17 | Stop reason: ALTCHOICE

## 2025-03-17 RX ORDER — ACETAMINOPHEN 650 MG/1
650 SUPPOSITORY RECTAL EVERY 4 HOURS PRN
Status: DISCONTINUED | OUTPATIENT
Start: 2025-03-17 | End: 2025-03-18

## 2025-03-17 RX ORDER — ENOXAPARIN SODIUM 100 MG/ML
40 INJECTION SUBCUTANEOUS 2 TIMES DAILY
Status: DISCONTINUED | OUTPATIENT
Start: 2025-03-17 | End: 2025-03-20 | Stop reason: HOSPADM

## 2025-03-17 RX ORDER — METOPROLOL SUCCINATE 100 MG/1
100 TABLET, EXTENDED RELEASE ORAL DAILY
Status: DISCONTINUED | OUTPATIENT
Start: 2025-03-17 | End: 2025-03-20 | Stop reason: HOSPADM

## 2025-03-17 RX ORDER — HYDROMORPHONE HYDROCHLORIDE 1 MG/ML
0.25 INJECTION, SOLUTION INTRAMUSCULAR; INTRAVENOUS; SUBCUTANEOUS
Status: DISCONTINUED | OUTPATIENT
Start: 2025-03-17 | End: 2025-03-17 | Stop reason: ALTCHOICE

## 2025-03-17 RX ORDER — FLUMAZENIL 0.1 MG/ML
0.2 INJECTION INTRAVENOUS AS NEEDED
Status: DISCONTINUED | OUTPATIENT
Start: 2025-03-17 | End: 2025-03-17 | Stop reason: ALTCHOICE

## 2025-03-17 RX ORDER — LOSARTAN POTASSIUM 100 MG/1
100 TABLET ORAL DAILY
Status: DISCONTINUED | OUTPATIENT
Start: 2025-03-17 | End: 2025-03-20 | Stop reason: HOSPADM

## 2025-03-17 RX ORDER — TAMSULOSIN HYDROCHLORIDE 0.4 MG/1
0.8 CAPSULE ORAL NIGHTLY
Status: DISCONTINUED | OUTPATIENT
Start: 2025-03-17 | End: 2025-03-20 | Stop reason: HOSPADM

## 2025-03-17 RX ORDER — NICOTINE POLACRILEX 4 MG
15 LOZENGE BUCCAL
Status: DISCONTINUED | OUTPATIENT
Start: 2025-03-17 | End: 2025-03-20 | Stop reason: HOSPADM

## 2025-03-17 RX ORDER — ROCURONIUM BROMIDE 10 MG/ML
INJECTION, SOLUTION INTRAVENOUS AS NEEDED
Status: DISCONTINUED | OUTPATIENT
Start: 2025-03-17 | End: 2025-03-17 | Stop reason: SURG

## 2025-03-17 RX ORDER — FUROSEMIDE 80 MG/1
80 TABLET ORAL DAILY
Status: DISCONTINUED | OUTPATIENT
Start: 2025-03-17 | End: 2025-03-20 | Stop reason: HOSPADM

## 2025-03-17 RX ORDER — ONDANSETRON 2 MG/ML
INJECTION INTRAMUSCULAR; INTRAVENOUS AS NEEDED
Status: DISCONTINUED | OUTPATIENT
Start: 2025-03-17 | End: 2025-03-17 | Stop reason: SURG

## 2025-03-17 RX ORDER — ATORVASTATIN CALCIUM 10 MG/1
10 TABLET, FILM COATED ORAL DAILY
Status: DISCONTINUED | OUTPATIENT
Start: 2025-03-17 | End: 2025-03-20 | Stop reason: HOSPADM

## 2025-03-17 RX ORDER — FENTANYL CITRATE 50 UG/ML
25 INJECTION, SOLUTION INTRAMUSCULAR; INTRAVENOUS
Status: DISCONTINUED | OUTPATIENT
Start: 2025-03-17 | End: 2025-03-17 | Stop reason: ALTCHOICE

## 2025-03-17 RX ORDER — LIDOCAINE HYDROCHLORIDE 20 MG/ML
INJECTION, SOLUTION INFILTRATION; PERINEURAL AS NEEDED
Status: DISCONTINUED | OUTPATIENT
Start: 2025-03-17 | End: 2025-03-17 | Stop reason: SURG

## 2025-03-17 RX ORDER — ACETAMINOPHEN 160 MG/5ML
650 SOLUTION ORAL EVERY 4 HOURS PRN
Status: DISCONTINUED | OUTPATIENT
Start: 2025-03-17 | End: 2025-03-18

## 2025-03-17 RX ORDER — AMLODIPINE BESYLATE 10 MG/1
10 TABLET ORAL
Status: DISCONTINUED | OUTPATIENT
Start: 2025-03-17 | End: 2025-03-20 | Stop reason: HOSPADM

## 2025-03-17 RX ORDER — SODIUM CHLORIDE 9 MG/ML
INJECTION, SOLUTION INTRAVENOUS AS NEEDED
Status: DISCONTINUED | OUTPATIENT
Start: 2025-03-17 | End: 2025-03-17 | Stop reason: HOSPADM

## 2025-03-17 RX ORDER — SODIUM CHLORIDE 9 MG/ML
100 INJECTION, SOLUTION INTRAVENOUS CONTINUOUS
Status: DISCONTINUED | OUTPATIENT
Start: 2025-03-17 | End: 2025-03-18

## 2025-03-17 RX ORDER — INDOCYANINE GREEN AND WATER 25 MG
2.5 KIT INJECTION ONCE
Status: COMPLETED | OUTPATIENT
Start: 2025-03-17 | End: 2025-03-17

## 2025-03-17 RX ORDER — POLYETHYLENE GLYCOL 3350 17 G/17G
17 POWDER, FOR SOLUTION ORAL DAILY PRN
Status: DISCONTINUED | OUTPATIENT
Start: 2025-03-17 | End: 2025-03-18

## 2025-03-17 RX ORDER — PROMETHAZINE HYDROCHLORIDE 25 MG/1
25 TABLET ORAL ONCE AS NEEDED
Status: DISCONTINUED | OUTPATIENT
Start: 2025-03-17 | End: 2025-03-17 | Stop reason: ALTCHOICE

## 2025-03-17 RX ORDER — MAGNESIUM HYDROXIDE 1200 MG/15ML
LIQUID ORAL AS NEEDED
Status: DISCONTINUED | OUTPATIENT
Start: 2025-03-17 | End: 2025-03-17 | Stop reason: HOSPADM

## 2025-03-17 RX ADMIN — INSULIN LISPRO 2 UNITS: 100 INJECTION, SOLUTION INTRAVENOUS; SUBCUTANEOUS at 20:54

## 2025-03-17 RX ADMIN — SODIUM CHLORIDE 100 ML/HR: 9 INJECTION, SOLUTION INTRAVENOUS at 16:34

## 2025-03-17 RX ADMIN — ALLOPURINOL 100 MG: 100 TABLET ORAL at 20:53

## 2025-03-17 RX ADMIN — SUCCINYLCHOLINE CHLORIDE 170 MG: 20 INJECTION, SOLUTION INTRAMUSCULAR; INTRAVENOUS; PARENTERAL at 11:33

## 2025-03-17 RX ADMIN — FENTANYL CITRATE 50 MCG: 50 INJECTION, SOLUTION INTRAMUSCULAR; INTRAVENOUS at 13:45

## 2025-03-17 RX ADMIN — SODIUM CHLORIDE 100 ML/HR: 9 INJECTION, SOLUTION INTRAVENOUS at 01:58

## 2025-03-17 RX ADMIN — METOPROLOL SUCCINATE 100 MG: 100 TABLET, EXTENDED RELEASE ORAL at 09:16

## 2025-03-17 RX ADMIN — FENTANYL CITRATE 50 MCG: 50 INJECTION, SOLUTION INTRAMUSCULAR; INTRAVENOUS at 10:55

## 2025-03-17 RX ADMIN — SODIUM CHLORIDE, SODIUM LACTATE, POTASSIUM CHLORIDE, AND CALCIUM CHLORIDE 9 ML/HR: 600; 310; 30; 20 INJECTION, SOLUTION INTRAVENOUS at 10:55

## 2025-03-17 RX ADMIN — PROPOFOL 150 MG: 10 INJECTION, EMULSION INTRAVENOUS at 11:33

## 2025-03-17 RX ADMIN — ONDANSETRON 4 MG: 2 INJECTION, SOLUTION INTRAMUSCULAR; INTRAVENOUS at 11:31

## 2025-03-17 RX ADMIN — ONDANSETRON 4 MG: 2 INJECTION, SOLUTION INTRAMUSCULAR; INTRAVENOUS at 01:58

## 2025-03-17 RX ADMIN — PHENYLEPHRINE HYDROCHLORIDE 200 MCG: 10 INJECTION INTRAVENOUS at 11:49

## 2025-03-17 RX ADMIN — HYDROMORPHONE HYDROCHLORIDE 0.5 MG: 1 INJECTION, SOLUTION INTRAMUSCULAR; INTRAVENOUS; SUBCUTANEOUS at 21:32

## 2025-03-17 RX ADMIN — FAMOTIDINE 20 MG: 10 INJECTION INTRAVENOUS at 10:55

## 2025-03-17 RX ADMIN — HYDROMORPHONE HYDROCHLORIDE 0.25 MG: 1 INJECTION, SOLUTION INTRAMUSCULAR; INTRAVENOUS; SUBCUTANEOUS at 16:57

## 2025-03-17 RX ADMIN — FENTANYL CITRATE 25 MCG: 50 INJECTION, SOLUTION INTRAMUSCULAR; INTRAVENOUS at 12:12

## 2025-03-17 RX ADMIN — SODIUM CHLORIDE 100 ML/HR: 9 INJECTION, SOLUTION INTRAVENOUS at 20:19

## 2025-03-17 RX ADMIN — SODIUM CHLORIDE, POTASSIUM CHLORIDE, SODIUM LACTATE AND CALCIUM CHLORIDE: 600; 310; 30; 20 INJECTION, SOLUTION INTRAVENOUS at 11:24

## 2025-03-17 RX ADMIN — INDOCYANINE GREEN AND WATER 2.5 MG: KIT at 11:17

## 2025-03-17 RX ADMIN — HYDROMORPHONE HYDROCHLORIDE 0.5 MG: 1 INJECTION, SOLUTION INTRAMUSCULAR; INTRAVENOUS; SUBCUTANEOUS at 23:41

## 2025-03-17 RX ADMIN — ONDANSETRON 4 MG: 2 INJECTION, SOLUTION INTRAMUSCULAR; INTRAVENOUS at 21:32

## 2025-03-17 RX ADMIN — ENOXAPARIN SODIUM 40 MG: 100 INJECTION SUBCUTANEOUS at 20:54

## 2025-03-17 RX ADMIN — ONDANSETRON 4 MG: 2 INJECTION, SOLUTION INTRAMUSCULAR; INTRAVENOUS at 16:31

## 2025-03-17 RX ADMIN — LIDOCAINE HYDROCHLORIDE 100 MG: 20 INJECTION, SOLUTION INFILTRATION; PERINEURAL at 11:33

## 2025-03-17 RX ADMIN — INSULIN GLARGINE 40 UNITS: 100 INJECTION, SOLUTION SUBCUTANEOUS at 20:55

## 2025-03-17 RX ADMIN — TAMSULOSIN HYDROCHLORIDE 0.8 MG: 0.4 CAPSULE ORAL at 20:53

## 2025-03-17 RX ADMIN — SODIUM CHLORIDE 1000 ML: 9 INJECTION, SOLUTION INTRAVENOUS at 00:44

## 2025-03-17 RX ADMIN — ONDANSETRON 4 MG: 2 INJECTION, SOLUTION INTRAMUSCULAR; INTRAVENOUS at 07:41

## 2025-03-17 RX ADMIN — HYDROCODONE BITARTRATE AND ACETAMINOPHEN 1 TABLET: 7.5; 325 TABLET ORAL at 16:40

## 2025-03-17 RX ADMIN — HYDROMORPHONE HYDROCHLORIDE 0.5 MG: 1 INJECTION, SOLUTION INTRAMUSCULAR; INTRAVENOUS; SUBCUTANEOUS at 18:52

## 2025-03-17 RX ADMIN — ESMOLOL HYDROCHLORIDE 30 MG: 100 INJECTION, SOLUTION INTRAVENOUS at 11:33

## 2025-03-17 RX ADMIN — PIPERACILLIN AND TAZOBACTAM 3.38 G: 3; .375 INJECTION, POWDER, FOR SOLUTION INTRAVENOUS at 12:00

## 2025-03-17 RX ADMIN — LATANOPROST 1 DROP: 50 SOLUTION/ DROPS OPHTHALMIC at 20:53

## 2025-03-17 RX ADMIN — Medication 10 ML: at 20:54

## 2025-03-17 RX ADMIN — HYDROMORPHONE HYDROCHLORIDE 0.5 MG: 1 INJECTION, SOLUTION INTRAMUSCULAR; INTRAVENOUS; SUBCUTANEOUS at 07:41

## 2025-03-17 RX ADMIN — ROCURONIUM BROMIDE 70 MG: 10 INJECTION INTRAVENOUS at 11:38

## 2025-03-17 RX ADMIN — ACETAMINOPHEN 1000 MG: 1000 INJECTION INTRAVENOUS at 12:46

## 2025-03-17 RX ADMIN — ASPIRIN 81 MG: 81 TABLET, COATED ORAL at 20:53

## 2025-03-17 RX ADMIN — PIPERACILLIN AND TAZOBACTAM 3.38 G: 3; .375 INJECTION, POWDER, FOR SOLUTION INTRAVENOUS at 10:00

## 2025-03-17 RX ADMIN — HYDROMORPHONE HYDROCHLORIDE 0.5 MG: 1 INJECTION, SOLUTION INTRAMUSCULAR; INTRAVENOUS; SUBCUTANEOUS at 16:31

## 2025-03-17 RX ADMIN — HYDROMORPHONE HYDROCHLORIDE 0.5 MG: 1 INJECTION, SOLUTION INTRAMUSCULAR; INTRAVENOUS; SUBCUTANEOUS at 01:00

## 2025-03-17 RX ADMIN — PHENYLEPHRINE HYDROCHLORIDE 200 MCG: 10 INJECTION INTRAVENOUS at 11:55

## 2025-03-17 RX ADMIN — FENTANYL CITRATE 25 MCG: 50 INJECTION, SOLUTION INTRAMUSCULAR; INTRAVENOUS at 11:56

## 2025-03-17 RX ADMIN — INSULIN LISPRO 2 UNITS: 100 INJECTION, SOLUTION INTRAVENOUS; SUBCUTANEOUS at 16:33

## 2025-03-17 RX ADMIN — SUGAMMADEX 400 MG: 100 INJECTION, SOLUTION INTRAVENOUS at 13:40

## 2025-03-17 NOTE — H&P
Patient Care Team:  Dereck Lemus MD as PCP - General (Internal Medicine)  Zay Anderson MD as Consulting Physician (Cardiology)    Chief complaint   Chief Complaint   Patient presents with    Abdominal Pain         Subjective     Patient is a 79 y.o. male presents with with abdominal pain but has been present for almost a week.  He says his pain began on Monday and progressed throughout the week until he was finally seen at the freestanding emergency room for [facility].  They did a workup and evaluation there including a CT scan which showed evidence for gallstones and probable cholecystitis but apparently sent him home nonetheless.  He was pain had been relieved after being given pain medication and he was instructed to call my office for an appointment in next week.  He contacted me 2 days later early in the morning saying that he had pain again, that was very similar to what he had before, he had some mild nausea but no emesis, but declined to go back to the emergency room because he did not have a good experience.  I encouraged him to go nonetheless because from as far as I could tell with the incomplete records that were available that he probably had gallbladder disease.  He said he preferred to call on Monday and make an appointment.  Unfortunately later that afternoon his pain became much worse and he presented to the emergency room at [facility].  The evaluation in the emergency room suggest that he had clear right upper quadrant tenderness.  His white blood cell was also elevated but he was afebrile.  They contacted me for admission but indicated that they had not repeated his CT scan.  I asked them to go ahead and do that because this certainly seems to me that this is a much more acute process than before.  They did order the CT scan, and it was much worse, apparently they went ahead and gave him some IV antibiotics and he was sent to the floor.  I had already started him on IV fluids,  pain medication, made him n.p.o., and ask for a general surgery consult.  At this point I will also continue his IV antibiotics.    At this point the patient is relatively stable although he does have persistent right upper quadrant pain.  He is n.p.o. and I have held his medications open although I put him on a sliding scale for his diabetes.  I have continued his Zosyn as well.  Hopefully the general surgeons will see him this morning and be able to take him to surgery sometime today.  Otherwise he seems to be relatively stable medically.  He is a morbidly obese generally noncompliant diabetic.  He has made some effort to control his diet a little better and his sugars have been under better control but he has not lost any weight, he does not exercise, and he keeps very infrequent office visits.  Typically he only calls when he has a problem.  He actually did not call me at all on this occasion when he was having abdominal pain.  He does indicate that his other medical conditions had been under good control and that he had been taking his medications regularly.  His blood sugar was elevated 175 and he says that his blood sugars been elevated all week which is likely a reflection of the active cholecystitis.        It is notable that his initial white blood cell count when he was seen on Friday was 9.2 and then in the emergency room on this occasion was up to 16.  Hopefully he can be evaluated soon and have this problem resolved quickly and then be back home without any complications.    Review of Systems   Pertinent items are noted in HPI, all other systems reviewed and negative    History  Past Medical History:   Diagnosis Date    Arthritis     Cancer     Cataract     Chronic kidney disease     Chronic pain disorder     Coronary artery disease     Enlarged prostate     Essential hypertension     Hard to intubate     History of kidney stones     Injury of back     Low back pain     Lumbosacral disc disease      Lymph edema     BLE    Neuropathy     BLE    HELEN (obstructive sleep apnea) treated with auto CPAP 04/02/2010    Overnight polysomnogram.  Weight 254 pounds.  Severe HELEN with AHI 41.8 events per hour.  Sleep-related hypoxia also present.  Auto CPAP prescribed.    Osteoporosis     Spinal stenosis     Type 2 diabetes mellitus with diabetic neuropathy, with long-term current use of insulin      Past Surgical History:   Procedure Laterality Date    CARDIAC CATHETERIZATION Left 5/17/2018    Procedure: Cardiac Catheterization/Vascular Study;  Surgeon: Kaitlin Barney MD;  Location: [facility] CATH INVASIVE LOCATION;  Service: Cardiovascular    CARDIAC CATHETERIZATION N/A 5/17/2018    Procedure: Stent LACEY coronary;  Surgeon: Kaitlin Barney MD;  Location: [facility] CATH INVASIVE LOCATION;  Service: Cardiovascular    CATARACT EXTRACTION      COLONOSCOPY N/A 4/3/2018    Procedure: COLONOSCOPY TO CECUM;  Surgeon: Royal Metcalf MD;  Location: [facility] ENDOSCOPY;  Service: General    EPIDURAL BLOCK      KIDNEY STONE SURGERY      LUMBAR DISCECTOMY Left 2/27/2019    Procedure: L4-5 lumbar decompression;  Surgeon: Darío Chowdhury MD;  Location: [facility] MAIN OR;  Service: Neurosurgery    SHOULDER ARTHROSCOPY Left     TONSILLECTOMY       Family History   Problem Relation Age of Onset    Diabetes Other     Stroke Other     Heart disease Father     Diabetes Maternal Grandmother     Diabetes Maternal Grandfather     No Known Problems Paternal Grandmother     No Known Problems Paternal Grandfather     Malig Hyperthermia Neg Hx      Social History     Tobacco Use    Smoking status: Never    Smokeless tobacco: Never   Substance Use Topics    Alcohol use: No    Drug use: No     Medications Prior to Admission   Medication Sig Dispense Refill Last Dose/Taking    allopurinol (ZYLOPRIM) 100 MG tablet Take 1 tablet by mouth Every Night.   Taking    amLODIPine (NORVASC) 10 MG tablet    Taking    aspirin 81 MG EC tablet Take 1 tablet by mouth Every  "Night. PT HOLDING FOR SURGERY   Taking    atorvastatin (LIPITOR) 10 MG tablet Take 1 tablet by mouth Take As Directed. MON, WED AND FRIDAY   Taking    Dulaglutide (TRULICITY SC) Inject 1 mL under the skin 1 (One) Time Per Week. TAKES EVERY TUESDAY   Taking    fluticasone (FLONASE) 50 MCG/ACT nasal spray Administer 2 sprays into the nostril(s) as directed by provider As Needed. Administer 2 sprays in each nostril for each dose.   Taking As Needed    furosemide (LASIX) 80 MG tablet Take 1 tablet by mouth Daily. Resume on 5/18   Taking    insulin glargine (LANTUS) 100 UNIT/ML injection Inject 80 Units under the skin into the appropriate area as directed Every Night.   Taking    ketoconazole (NIZORAL) 2 % cream Apply 1 Application topically to the appropriate area as directed Daily.   Taking    latanoprost (XALATAN) 0.005 % ophthalmic solution Administer 1 drop to both eyes Every Night.   Taking    losartan (COZAAR) 100 MG tablet Take 1 tablet by mouth Daily.   Taking    metFORMIN (GLUCOPHAGE) 500 MG tablet Take 4 tablets by mouth Daily With Breakfast. PT TOOK ONLY 1000 MG YESTERDAY MORNING    Resume on 5/19   Taking    metoprolol succinate XL (TOPROL-XL) 100 MG 24 hr tablet Take 1 tablet by mouth Daily.   Taking    NIFEdipine XL (PROCARDIA XL) 60 MG 24 hr tablet Take 1 tablet by mouth Every Night.   Taking    pioglitazone (ACTOS) 45 MG tablet Take 1 tablet by mouth Daily.   Taking    tamsulosin (FLOMAX) 0.4 MG capsule 24 hr capsule Take 2 capsules by mouth Every Night.   Taking    Vitamin D, Cholecalciferol, (CHOLECALCIFEROL) 400 units tablet Take 1 tablet by mouth Daily.   Taking    BD INSULIN SYRINGE U/F 31G X 5/16\" 1 ML misc        HYDROcodone-acetaminophen (NORCO) 5-325 MG per tablet Take 1 tablet by mouth Every 6 (Six) Hours As Needed for Moderate Pain for up to 12 doses. 12 tablet 0     ondansetron ODT (ZOFRAN-ODT) 4 MG disintegrating tablet Place 1 tablet on the tongue 4 (Four) Times a Day As Needed for Nausea " or Vomiting. 15 tablet 0      Allergies:  Ceclor [cefaclor]        Objective     Vital Signs  Temp:  [97.5 °F (36.4 °C)-98.3 °F (36.8 °C)] 98.1 °F (36.7 °C)  Heart Rate:  [70-93] 80  Resp:  [18] 18  BP: (116-141)/(52-61) 117/55    Physical Exam:      General Appearance:  Alert, cooperative, in no acute distress, but he is a morbidly obese elderly white male who looks like he does not feel well   Head:  Normocephalic, without obvious abnormality, atraumatic   Eyes:  Lids and lashes normal, conjunctivae and sclerae normal, no icterus, no pallor, corneas clear, PERRLA   Ears:  Ears appear intact with no abnormalities noted   Throat:  No oral lesions, no thrush, oral mucosa moist, but his neck is very short and thick.   Neck:  No adenopathy, supple, trachea midline, no thyromegaly, no carotid bruit, no JVD   Back:  He is not complaining of low back pain this morning.  He is moving easily about in bed without complaints of pain, and generally this does not seem to be an issue at this point.   Lungs:  Clear to auscultation, respirations regular, even and unlabored, no wheezes or rhonchi are noted    Heart:  Regular rhythm and normal rate, normal S1 and S2, no murmur,   Chest Wall:  No abnormalities observed   Abdomen:  His abdomen is large and distended and tense.  This is all from his obesity.  He is clearly tender in his right upper quadrant.  He has no epigastric, no left lower quadrant or right lower quadrant pain.  Is a very difficult exam because of his tense obesity.   Rectal:  Deferred   Extremities:  Moves all extremities well, no edema, no cyanosis, no redness   Pulses:  Pulses palpable and equal bilaterally   Skin:  No bleeding, bruising or rash   Lymph nodes:  No palpable adenopathy   Neurologic:  Cranial nerves 2 - 12 grossly intact, sensation intact, DTR present and equal bilaterally                                                                               Results Review:    I reviewed the patient's new  "clinical results.  I reviewed the patient's new imaging results and agree with the interpretation.  I reviewed the patient's other test results and agree with the interpretation  \"           Results from last 7 days   Lab Units 03/16/25 2058   SODIUM mmol/L 138   POTASSIUM mmol/L 3.6   CHLORIDE mmol/L 98   CO2 mmol/L 28.2   BUN mg/dL 27*   CREATININE mg/dL 1.25   GLUCOSE mg/dL 175*   CALCIUM mg/dL 9.8     Results from last 7 days   Lab Units 03/16/25  2229 03/16/25 2058 03/14/25  0728   HSTROP T ng/L 19 16 13     Results from last 7 days   Lab Units 03/16/25 2058   WBC 10*3/mm3 16.10*   HEMOGLOBIN g/dL 12.0*   HEMATOCRIT % 35.6*   PLATELETS 10*3/mm3 189                        Assessment & Plan       Epigastric pain    Type 2 diabetes mellitus with neurologic complication    Class 3 severe obesity due to excess calories with serious comorbidity and body mass index (BMI) of 40.0 to 44.9 in adult    Acute cholecystitis    Essential hypertension    Benign prostatic hyperplasia with lower urinary tract symptoms    Obstructive sleep apnea treated with auto CPAP    Lumbar disc disease    High cholesterol      #1 Acute Cholecystitis-the repeat CT scan clearly indicates cholecystitis and with an elevated white blood cell count this may be a little more complicated now.  He is on IV Zosyn and we are waiting for the general surgeons to see and evaluate the patient and make a plan for addressing this issue.    2.  Diabetes mellitus type 2-his blood sugars are little elevated but I think that is a reflection of his acute illness.  Generally his blood sugars have been better controlled in the last couple of years.  I will check a hemoglobin A1c.  Currently he is n.p.o. and covered with a sliding scale.    3.  Hypertension-his blood pressure surprisingly well-controlled considering how much pain he indicates that he is in.  He does get good pain relief with the medications.    4.  Sleep apnea-he has is CPAP with some and was " using it this morning when I woke him up.  The nurse indicated that he slept relatively well overnight and did not ask for pain medication until this morning.    5.  Lumbar disc disease-he has a history of lumbar disc disease and arthritis.  Fortunately he is not having much pain at this point and moving about easily in bed.    6.  BPH-he is on medication with no complaints of discomfort occultly urinating.    7.  High cholesterol-on medications and relatively well-controlled as an outpatient    8.  Mild nausea-likely related to the cholecystitis without any emesis    9.  Morbid obesity-longstanding and the patient's been resistant to modifying his diet to lose weight or exercising regularly.  Unfortunately this is likely to complicate his recovery as well as being the cause of his sleep apnea.    10.  Gout-he is on allopurinol.    11.  ASCVD-he had stent placement in the past but at this point is stable from a cardiac standpoint with no symptoms of angina.          I discussed the patient's findings and my recommendations with patient and nursing staff.     Dereck Lemus MD  03/17/25  08:01 EDT    Time:

## 2025-03-17 NOTE — ANESTHESIA POSTPROCEDURE EVALUATION
Patient: Will Garcia    Procedure Summary       Date: 03/17/25 Room / Location: [facility] OR 07 / [facility] MAIN OR; [facility] XRAY    Anesthesia Start: 1123 Anesthesia Stop: 1352    Procedures:       Laparoscopic cholecystectomy with cholangiogram (Abdomen)      FL CHOLANGIOGRAM OPERATIVE Diagnosis:       Acute cholecystitis      (Acute cholecystitis [K81.0])      (IOC)    Scheduled Providers: Glen Pimentel MD Provider: Artur Cast MD    Anesthesia Type: general ASA Status: 3            Anesthesia Type: general    Vitals  Vitals Value Taken Time   /57 03/17/25 15:15   Temp 37.3 °C (99.2 °F) 03/17/25 13:49   Pulse 68 03/17/25 15:21   Resp 21 03/17/25 15:00   SpO2 93 % 03/17/25 15:21   Vitals shown include unfiled device data.        Post Anesthesia Care and Evaluation    Anesthetic complications: No anesthetic complications     no

## 2025-03-17 NOTE — ANESTHESIA PREPROCEDURE EVALUATION
Anesthesia Evaluation     Patient summary reviewed and Nursing notes reviewed   history of anesthetic complications:  difficult airway  NPO Solid Status: > 8 hours  NPO Liquid Status: > 4 hours           Airway   Mallampati: II  TM distance: <3 FB  Neck ROM: full  Difficult intubation highly probable, Large neck circumference and Anterior  Comment: beard  Dental - normal exam     Pulmonary     breath sounds clear to auscultation  (+) ,sleep apnea  Cardiovascular     Rhythm: regular    (+) hypertension, CAD, cardiac stents , hyperlipidemia      Neuro/Psych  GI/Hepatic/Renal/Endo    (+) obesity, morbid obesity, renal disease-, diabetes mellitus    ROS Comment: BMI 42.55    Musculoskeletal     Abdominal   (+) obese   Substance History      OB/GYN          Other   arthritis,   history of cancer                Anesthesia Plan    ASA 3     general     (CMAC)  intravenous induction     Anesthetic plan, risks, benefits, and alternatives have been provided, discussed and informed consent has been obtained with: patient.    CODE STATUS:    Code Status (Patient has no pulse and is not breathing): CPR (Attempt to Resuscitate)  Medical Interventions (Patient has pulse or is breathing): Full Support  Level Of Support Discussed With: Patient       Intermediate / Complex Repair - Final Wound Length In Cm: 5.5

## 2025-03-17 NOTE — PROGRESS NOTES
[facility]: Enoxaparin Consult    Will Garcia has a pharmacy consult to dose prophylactic enoxaparin per Dr. Lemus's request.     Indication: VTE Prophylaxis  Home Anticoagulation: N/A     Relevant clinical data and objective history reviewed:  79 y.o. male       There is no height or weight on file to calculate BMI.   Results from last 7 days   Lab Units 03/16/25  2058   PLATELETS 10*3/mm3 189     Estimated Creatinine Clearance: 63.2 mL/min (by C-G formula based on SCr of 1.25 mg/dL).    Assessment/Plan    Will start patient on 40mg subcutaneous every 12 hours, adjusted for renal function. Consult order will be discontinued but pharmacy will continue to follow.     Claude Osullivan III, [facility]  Clinical Pharmacist

## 2025-03-17 NOTE — OP NOTE
OPERATIVE REPORT    DATE: 03/17/25     SURGEON: Glen Pimentel MD     ASSISTANT: Graham Avelar, who was present for necessary suctioning, retracting, incision closing and camera holding throughout the procedure     OPERATION PERFORMED: Laparoscopic cholecystectomy with intraoperative cholangiogram    PREOPERATIVE DIAGNOSIS: Acute cholecystitis with cholelithiasis    POSTOPERATIVE DIAGNOSIS: Acute gangrenous cholecystitis with cholelithiasis    ANESTHESIA: General    SPECIMEN: Gallbladder     DRAINS: 19 Fr. Erik drain    BLOOD LOSS: 20 cc    INDICATION FOR OPERATION: This is a 79 y.o. gentleman with acute cholecystitis, being symptomatic for the last week.  I recommended laparoscopic cholecystectomy with a low threshold to convert to open operation given the severity of his cholecystitis. The risks of cholecystectomy including bile duct injury, bleeding, infection, and conversion to an open procedure, were discussed along with the benefits and alternatives to surgery.     OPERATIVE COURSE: The patient was taken to the operating room and positioned supine with arms out. General endotracheal anesthesia was induced. The abdomen was prepped and draped in sterile fashion. Preoperative antibiotics were administered. A pre-procedural timeout was performed.  The abdomen was entered with a 12 mm trocar using the Optiview technique to the right of the umbilicus and the abdomen was insufflated. There was no sign of visceral injury. The patient was positioned in reverse Trendelenburg with right side up. Two subcostal 5 mm trocars were inserted in the right upper quadrant under direct vision. A paramedian subxiphoid 10 mm trocar was inserted under direct vision. The gallbladder appeared hydropic and severely inflamed.  There was a thick inflammatory rind.  There was a large amount of intra-abdominal fat, making visualization of the gallbladder difficult.  An additional 5 mm trocar was inserted in the right upper quadrant and a  3 finger retractor was utilized to depress the greater omentum and transverse colon. The dome of the gallbladder could not be grasped given the degree of distention.  The gallbladder was decompressed with an ovarian cyst aspiration needle and 50 cc of green translucent bile was aspirated.  The dome of the gallbladder was retracted cephalad over the liver.  During this process the gangrenous gallbladder wall was torn.  Spilled bile was promptly suctioned away.  Omental adhesions were taken off the gallbladder with blunt dissection and electrocautery. The infundibulum of the gallbladder was retracted laterally. The peritoneum overlying the gallbladder was incised and dissected off the gallbladder infundibulum. A combination of blunt dissection and electrocautery were utilized to identify the cystic duct.  The cystic artery was cauterized and divided during dissection.  A fibrotic scar and fibroadipose tissue was cleared from the hepatic with cystic triangle.  And the lower portion of the gallbladder was dissected off the cystic plate.  Indocyanine green cholangiography was utilized to facilitate identification of the common bile duct and cystic duct.  The gallbladder did not illuminate, suggesting true cystic duct obstruction. A clip was placed across the cystic duct at the infundibulum, the cystic duct was incised, and an intraoperative cholangiogram was performed with a taut cholangiocatheter. The cholangiogram demonstrated opacification of the cystic duct, common bile duct, intra and extrahepatic hepatic ducts, and duodenum without any filling defects. The cholangiocatheter was removed. The cystic duct was doubly clipped and divided. The gallbladder was dissected off the liver with electrocautery.  The posterior wall of the gallbladder was necrotic and it appears as though it was in the process of perforating into the liver. The gallbladder was placed in an Endo Catch bag along with 2 large spilled stones and  removed through the subxiphoid trocar site. The cystic duct stump was inspected, there was no bile leak and clip placement was confirmed. Hemostasis of the gallbladder fossa was achieved with electrocautery. The right upper quadrant was irrigated with warm normal saline.  A 19 English Erik drain was inserted through the right upper quadrant trocar site and left in the gallbladder fossa. The fascia of the two 12 mm trocar sites was closed with 0 Vicryl using the Nhan-Mynor suture passer. The abdomen was desufflated and trocars were removed.  Incisions were reapproximated with 4-0 Vicryl and closed with Exofin. All needle, sponge, and instrument counts were correct at case conclusion. The patient was extubated and transported to the recovery room in stable condition.      Glen Pimentel MD   General Surgery    [address], [address]  [place], [place], [address]  P: [phone]  F: [phone]

## 2025-03-17 NOTE — ED PROVIDER NOTES
ED Course as of 03/16/25 2328   [place] Mar 16, 2025   1932 Patient had recent CT imaging and ultrasound imaging of the gallbladder.  At this time I do not think we need to repeat those studies.  He is afebrile.  Not actively vomiting.  We will check some basic labs including CMP to make sure that his liver function tests remain normal in comparison to the workup from 2 days ago.  Then will contact his PCP for further recommendations regarding disposition planning. [BINU]   2049 Patient with extremely difficult IV access.  Given delay in obtaining labs and IV access, will order dose of Zofran ODT and IM morphine. [MP]   2116 WBC(!): 16.10 [MP]   2116 Hemoglobin(!): 12.0 [MP]   2130 BUN(!): 27 [MP]   2130 Creatinine: 1.25 [MP]   2204 Discussed with Dr. Lemus.  Reviewed patient presentation and ED findings.  He requests stat CT of the abdomen and pelvis.  Request we give liter of normal saline.  He agrees admit patient to a telemetry bed. [MP]      ED Course User Index  [BINU] Jason Castillo MD  [MP] Rosanne Phillip PA-C Pleiss, Melanie M, PA-C  03/16/25 2322

## 2025-03-17 NOTE — CASE MANAGEMENT/SOCIAL WORK
Discharge Planning Assessment  [facility]     Patient Name: Will Garcia  MRN: [id number]  Today's Date: 3/17/2025    Admit Date: 3/16/2025    Plan: Home with wife   Discharge Needs Assessment       Row Name 03/17/25 1218       Living Environment    People in Home spouse    Name(s) of People in Home Melanie    Current Living Arrangements home    Potentially Unsafe Housing Conditions none    Primary Care Provided by self    Family Caregiver if Needed spouse    Quality of Family Relationships involved;helpful    Able to Return to Prior Arrangements yes       Resource/Environmental Concerns    Resource/Environmental Concerns none    Transportation Concerns none       Transition Planning    Patient/Family Anticipates Transition to home with family    Patient/Family Anticipated Services at Transition none    Transportation Anticipated family or friend will provide       Discharge Needs Assessment    Readmission Within the Last 30 Days no previous admission in last 30 days    Equipment Currently Used at Home cpap;bp cuff;pulse ox;glucometer;grab bar    Concerns to be Addressed adjustment to diagnosis/illness    Anticipated Changes Related to Illness none    Equipment Needed After Discharge none                   Discharge Plan       Row Name 03/17/25 1218       Plan    Plan Home with wife    Patient/Family in Agreement with Plan yes    Plan Comments Spoke to pt at bedside, introduced self and explained CCP role, verified face sheet and pharmacy information. Pt lives with wife Melanie in 1 level home with 3 ALICIA, he is IADL's, uses bp cuff, cpap, gb, pulse ox and glucometer. He has no HH history but has been to [facility] in the past. He plans home with son to transport, denies dc needs. CCP will follow - Danielle WILLARDAlirio                    Expected Discharge Date and Time       Expected Discharge Date Expected Discharge Time    Mar 18, 2025            Demographic Summary       Row Name 03/17/25 1217       General Information    Admission  Type observation                   Functional Status       Row Name 03/17/25 1217       Functional Status    Usual Activity Tolerance excellent    Current Activity Tolerance good       Assessment of Health Literacy    Health Literacy Good       Functional Status, IADL    Medications independent    Meal Preparation independent    Housekeeping independent    Laundry independent    Shopping independent       Mental Status    General Appearance WDL WDL       Mental Status Summary    Recent Changes in Mental Status/Cognitive Functioning no changes                   Psychosocial    No documentation.                  Abuse/Neglect    No documentation.                  Legal       Row Name 03/17/25 1218       Financial/Legal    Who Manages Finances if Patient Unable wife Melanie                   Substance Abuse    No documentation.                  Patient Forms    No documentation.                     Danielle Hernandez RN

## 2025-03-17 NOTE — NURSING NOTE
Patient refusing blood cultures/ labs this am. Teaching provided on importance of cultures and labs.

## 2025-03-17 NOTE — ANESTHESIA PROCEDURE NOTES
Airway  Reason: elective    Date/Time: 3/17/2025 11:41 AM  Difficult airway    General Information and Staff    Patient location during procedure: OR    Indications and Patient Condition  Indications for airway management: airway protection    Preoxygenated: yes    Mask difficulty assessment: 1 - vent by mask    Final Airway Details    Final airway type: endotracheal airway      Successful airway: ETT  Cuffed: yes   Successful intubation technique: RSI and video laryngoscopy  Adjuncts used in placement: cricoid pressure and Bougie  Endotracheal tube insertion site: oral  Blade: CMAC  Blade size: D  ETT size (mm): 7.5  Cormack-Lehane Classification: grade IIa - partial view of glottis  Placement verified by: chest auscultation and capnometry   Measured from: lips  ETT/EBT  to lips (cm): 23  Number of attempts at approach: 2  Assessment: lips, teeth, and gum same as pre-op and atraumatic intubation

## 2025-03-17 NOTE — CONSULTS
General Surgery History and Physical      Summary:    Will Garcia is a 79 y.o. gentleman with diabetes, sleep apnea, and coronary artery disease with remote coronary stent presenting with acute cholecystitis.  He has been symptomatic for the last week and appears to have significant cholecystitis.  I recommend laparoscopic cholecystectomy with intraoperative cholangiogram.  We discussed the details of the procedure, associated risks including bile duct injury, conversion to open operation, and infection, as well as the expected postoperative course.  He agrees to this plan and wishes to proceed.      History of Present Illness:    Will Garcia is a 79 y.o. gentleman with history of type 2 diabetes, class III obesity sleep apnea, and coronary artery disease with stent placed in 2018 presenting with postprandial right upper quadrant pain that has been worsening for the last week.  He originally presented to the emergency department on 3/14 with similar yet milder symptoms and was sent home.  He denies fever, vomiting, jaundice, scleral icterus, history of pancreatitis, and liver problems in the past.  He has never had surgery on his abdomen.  He is on aspirin alone, no longer on Plavix.  He uses a CPAP at night.  He is focally tender in the right upper quadrant.  CT scan demonstrates cholelithiasis and a distended gallbladder with significant amount of inflammation and stranding, also involving the duodenum.  Significant amount of inflammation of the gallbladder involving the duodenum.  CT scan on 3/14 similarly showed a distended gallbladder without pericholecystic fluid and stranding.  Labs significant for white blood cell count of 16,000, his bilirubin and liver enzymes are normal.  He had a normal colonoscopy in 2018.    Past Medical History:   Past Medical History:   Diagnosis Date    Arthritis     Cancer     Cataract     Chronic kidney disease     Chronic pain disorder     Coronary artery disease     Enlarged  prostate     Essential hypertension     Hard to intubate     History of kidney stones     Injury of back     Low back pain     Lumbosacral disc disease     Lymph edema     Neuropathy     HELEN (obstructive sleep apnea) treated with auto CPAP 04/02/2010    Osteoporosis     Spinal stenosis     Type 2 diabetes mellitus with diabetic neuropathy, with long-term current use of insulin           Past Surgical History:    Past Surgical History:   Procedure Laterality Date    CARDIAC CATHETERIZATION Left 5/17/2018    Procedure: Cardiac Catheterization/Vascular Study;  Surgeon: Kaitlin Barney MD;  Location:  [facility];  Service: Cardiovascular    CARDIAC CATHETERIZATION N/A 5/17/2018    Procedure: Stent LACEY coronary;  Surgeon: Kaitlin Barney MD;  Location:  [facility];  Service: Cardiovascular    CATARACT EXTRACTION      COLONOSCOPY N/A 4/3/2018    Procedure: COLONOSCOPY TO CECUM;  Surgeon: Royal Metcalf MD;  Location: [facility] ENDOSCOPY;  Service: General    EPIDURAL BLOCK      KIDNEY STONE SURGERY      LUMBAR DISCECTOMY Left 2/27/2019    Procedure: L4-5 lumbar decompression;  Surgeon: Darío Chowdhury MD;  Location: [facility] MAIN OR;  Service: Neurosurgery    SHOULDER ARTHROSCOPY Left     TONSILLECTOMY          Family History:    Family History   Problem Relation Age of Onset    Diabetes Other     Stroke Other     Heart disease Father     Diabetes Maternal Grandmother     Diabetes Maternal Grandfather     No Known Problems Paternal Grandmother     No Known Problems Paternal Grandfather     Malig Hyperthermia Neg Hx         Medications:   Reviewed in the chart      Laboratory Results  WBC 16.10, hemoglobin 12, platelets 189  Lactic acid 0.8  Total bilirubin 0.6, AST 14, ALT 8 alkaline phosphatase 90  Radiology  I have personally reviewed CT scan of the abdomen and pelvis demonstrating cholelithiasis with distended inflamed gallbladder with stranding also involving the adjacent duodenum.   No obvious biliary ductal dilation.    Endoscopy  I have personally reviewed colonoscopy report by Dr. Metcalf in 2018 demonstrating no polyps or other abnormalities.      Physical Exam:   Vitals:    03/17/25 0745   BP: 128/59   Pulse: 87   Resp: 18   Temp: 98.1 °F (36.7 °C)   SpO2: 91%      General: not sick, uncomfortable   HEENT: no scleral icterus, no jaundice  Cardiac: normal rate, no JVD  Respiratory: nonlabored breathing on room air, CPAP and use  Abdomen: soft, focally tender in the right upper quadrant with some involuntary guarding, nondistended yet protuberant       Glen Pimentel M.D.  General Surgery  [facility]    [address], [address]  [place], [place], [address]  P: [phone]  F: [phone]

## 2025-03-17 NOTE — ED NOTES
Patients wife Melanie would like a call when the CT results are in. She does not care how late it is, phone number on file

## 2025-03-17 NOTE — ED NOTES
.Nursing report ED to floor  Will Garcia  79 y.o.  male    HPI :  HPI  Stated Reason for Visit: abdominal pain  History Obtained From: patient    Chief Complaint  Chief Complaint   Patient presents with    Abdominal Pain       Admitting doctor:   Dereck Lemus MD    Admitting diagnosis:   The primary encounter diagnosis was Epigastric pain. A diagnosis of Gallstones was also pertinent to this visit.    Code status:   Current Code Status       Date Active Code Status Order ID Comments User Context       Prior            Allergies:   Ceclor [cefaclor]    Isolation:   No active isolations    Intake and Output  No intake or output data in the 24 hours ending 03/16/25 2217    Weight:   There were no vitals filed for this visit.    Most recent vitals:   Vitals:    03/16/25 1934 03/16/25 2054 03/16/25 2055 03/16/25 2101   BP: 128/60 116/61  117/56   Pulse:  70 75 74   Resp:       Temp:       SpO2:  91% 92% 92%       Active LDAs/IV Access:   Lines, Drains & Airways       Active LDAs       Name Placement date Placement time Site Days    Peripheral IV 03/16/25 2052 Anterior;Proximal;Right Forearm 03/16/25 2052  Forearm  less than 1                    Labs (abnormal labs have a star):   Labs Reviewed   COMPREHENSIVE METABOLIC PANEL - Abnormal; Notable for the following components:       Result Value    Glucose 175 (*)     BUN 27 (*)     eGFR 58.9 (*)     All other components within normal limits    Narrative:     GFR Categories in Chronic Kidney Disease (CKD)      GFR Category          GFR (mL/min/1.73)    Interpretation  G1                     90 or greater         Normal or high (1)  G2                      60-89                Mild decrease (1)  G3a                   45-59                Mild to moderate decrease  G3b                   30-44                Moderate to severe decrease  G4                    15-29                Severe decrease  G5                    14 or less           Kidney failure          (1)In  the absence of evidence of kidney disease, neither GFR category G1 or G2 fulfill the criteria for CKD.    eGFR calculation 2021 CKD-EPI creatinine equation, which does not include race as a factor   CBC WITH AUTO DIFFERENTIAL - Abnormal; Notable for the following components:    WBC 16.10 (*)     RBC 4.02 (*)     Hemoglobin 12.0 (*)     Hematocrit 35.6 (*)     Neutrophil % 83.2 (*)     Lymphocyte % 8.1 (*)     Immature Grans % 1.0 (*)     Neutrophils, Absolute 13.39 (*)     Monocytes, Absolute 1.15 (*)     Immature Grans, Absolute 0.16 (*)     All other components within normal limits   LIPASE - Normal   TROPONIN - Normal    Narrative:     High Sensitive Troponin T Reference Range:  <14.0 ng/L- Negative Female for AMI  <22.0 ng/L- Negative Male for AMI  >=14 - Abnormal Female indicating possible myocardial injury.  >=22 - Abnormal Male indicating possible myocardial injury.   Clinicians would have to utilize clinical acumen, EKG, Troponin, and serial changes to determine if it is an Acute Myocardial Infarction or myocardial injury due to an underlying chronic condition.        BLOOD CULTURE   BLOOD CULTURE   URINALYSIS W/ MICROSCOPIC IF INDICATED (NO CULTURE)   LACTIC ACID, PLASMA   HIGH SENSITIVITIY TROPONIN T 1HR   CBC AND DIFFERENTIAL    Narrative:     The following orders were created for panel order CBC & Differential.  Procedure                               Abnormality         Status                     ---------                               -----------         ------                     CBC Auto Differential[[id number]]        Abnormal            Final result                 Please view results for these tests on the individual orders.       EKG:   No orders to display       Meds given in ED:   Medications   sodium chloride 0.9 % flush 10 mL (has no administration in time range)   ondansetron ODT (ZOFRAN-ODT) disintegrating tablet 4 mg (4 mg Oral Not Given 3/16/25 2108)   morphine injection 6 mg (6 mg  Intramuscular Not Given 3/16/25 2108)   piperacillin-tazobactam (ZOSYN) 4.5 g IVPB in 100 mL NS MBP (CD) (has no administration in time range)   sodium chloride 0.9 % bolus 1,000 mL (has no administration in time range)   ondansetron (ZOFRAN) injection 4 mg (4 mg Intravenous Given 3/16/25 2058)   morphine injection 4 mg (4 mg Intravenous Given 3/16/25 2057)   ondansetron (ZOFRAN) injection 4 mg (4 mg Intravenous Given 3/16/25 2152)   morphine injection 4 mg (4 mg Intravenous Given 3/16/25 2153)       Imaging results:  No radiology results for the last day    Ambulatory status:   - ambulatory    Social issues:   Social History     Socioeconomic History    Marital status:      Spouse name: Melanie    Number of children: 2    Years of education: 21   Tobacco Use    Smoking status: Never    Smokeless tobacco: Never   Substance and Sexual Activity    Alcohol use: No    Drug use: No    Sexual activity: Defer       Peripheral Neurovascular  Peripheral Neurovascular (Adult)  Peripheral Neurovascular WDL: WDL    Neuro Cognitive  Neuro Cognitive (Adult)  Cognitive/Neuro/Behavioral WDL: WDL    Learning  Learning Assessment  Learning Readiness and Ability: no barriers identified    Respiratory  Respiratory  Airway WDL: WDL  Respiratory WDL  Respiratory WDL: WDL    Abdominal Pain       Pain Assessments  Pain (Adult)  (0-10) Pain Rating: Rest: 8  (0-10) Pain Rating: Activity: 8  Pain Location: abdomen    NIH Stroke Scale       Ilana Goel RN  03/16/25 22:17 EDT

## 2025-03-18 ENCOUNTER — APPOINTMENT (OUTPATIENT)
Dept: GENERAL RADIOLOGY | Facility: HOSPITAL | Age: 80
End: 2025-03-18
Payer: COMMERCIAL

## 2025-03-18 PROBLEM — K80.20 CHOLELITHIASIS: Status: ACTIVE | Noted: 2025-03-18

## 2025-03-18 LAB
ALBUMIN SERPL-MCNC: 3.1 G/DL (ref 3.5–5.2)
ALBUMIN/GLOB SERPL: 1 G/DL
ALP SERPL-CCNC: 105 U/L (ref 39–117)
ALT SERPL W P-5'-P-CCNC: 19 U/L (ref 1–41)
ANION GAP SERPL CALCULATED.3IONS-SCNC: 9.7 MMOL/L (ref 5–15)
ANION GAP SERPL CALCULATED.3IONS-SCNC: 9.7 MMOL/L (ref 5–15)
AST SERPL-CCNC: 23 U/L (ref 1–40)
BASOPHILS # BLD AUTO: 0.03 10*3/MM3 (ref 0–0.2)
BASOPHILS NFR BLD AUTO: 0.2 % (ref 0–1.5)
BILIRUB SERPL-MCNC: 0.5 MG/DL (ref 0–1.2)
BUN SERPL-MCNC: 30 MG/DL (ref 8–23)
BUN SERPL-MCNC: 30 MG/DL (ref 8–23)
BUN/CREAT SERPL: 22.1 (ref 7–25)
BUN/CREAT SERPL: 22.1 (ref 7–25)
CALCIUM SPEC-SCNC: 8.7 MG/DL (ref 8.6–10.5)
CALCIUM SPEC-SCNC: 8.7 MG/DL (ref 8.6–10.5)
CHLORIDE SERPL-SCNC: 102 MMOL/L (ref 98–107)
CHLORIDE SERPL-SCNC: 102 MMOL/L (ref 98–107)
CHOLEST SERPL-MCNC: 90 MG/DL (ref 0–200)
CO2 SERPL-SCNC: 24.3 MMOL/L (ref 22–29)
CO2 SERPL-SCNC: 24.3 MMOL/L (ref 22–29)
CREAT SERPL-MCNC: 1.36 MG/DL (ref 0.76–1.27)
CREAT SERPL-MCNC: 1.36 MG/DL (ref 0.76–1.27)
CYTO UR: NORMAL
DEPRECATED RDW RBC AUTO: 46.3 FL (ref 37–54)
EGFRCR SERPLBLD CKD-EPI 2021: 53.3 ML/MIN/1.73
EGFRCR SERPLBLD CKD-EPI 2021: 53.3 ML/MIN/1.73
EOSINOPHIL # BLD AUTO: 0.09 10*3/MM3 (ref 0–0.4)
EOSINOPHIL NFR BLD AUTO: 0.7 % (ref 0.3–6.2)
ERYTHROCYTE [DISTWIDTH] IN BLOOD BY AUTOMATED COUNT: 13.6 % (ref 12.3–15.4)
GLOBULIN UR ELPH-MCNC: 3.2 GM/DL
GLUCOSE BLDC GLUCOMTR-MCNC: 121 MG/DL (ref 70–130)
GLUCOSE BLDC GLUCOMTR-MCNC: 122 MG/DL (ref 70–130)
GLUCOSE BLDC GLUCOMTR-MCNC: 132 MG/DL (ref 70–130)
GLUCOSE BLDC GLUCOMTR-MCNC: 151 MG/DL (ref 70–130)
GLUCOSE SERPL-MCNC: 116 MG/DL (ref 65–99)
GLUCOSE SERPL-MCNC: 116 MG/DL (ref 65–99)
HBA1C MFR BLD: 7.1 % (ref 4.8–5.6)
HCT VFR BLD AUTO: 34.3 % (ref 37.5–51)
HDLC SERPL-MCNC: 39 MG/DL (ref 40–60)
HGB BLD-MCNC: 10.8 G/DL (ref 13–17.7)
HOLD SPECIMEN: NORMAL
IMM GRANULOCYTES # BLD AUTO: 0.03 10*3/MM3 (ref 0–0.05)
IMM GRANULOCYTES NFR BLD AUTO: 0.2 % (ref 0–0.5)
LAB AP CASE REPORT: NORMAL
LDLC SERPL CALC-MCNC: 36 MG/DL (ref 0–100)
LDLC/HDLC SERPL: 0.96 {RATIO}
LYMPHOCYTES # BLD AUTO: 0.94 10*3/MM3 (ref 0.7–3.1)
LYMPHOCYTES NFR BLD AUTO: 7.8 % (ref 19.6–45.3)
MAGNESIUM SERPL-MCNC: 2.1 MG/DL (ref 1.6–2.4)
MCH RBC QN AUTO: 28.8 PG (ref 26.6–33)
MCHC RBC AUTO-ENTMCNC: 31.5 G/DL (ref 31.5–35.7)
MCV RBC AUTO: 91.5 FL (ref 79–97)
MONOCYTES # BLD AUTO: 1.19 10*3/MM3 (ref 0.1–0.9)
MONOCYTES NFR BLD AUTO: 9.9 % (ref 5–12)
NEUTROPHILS NFR BLD AUTO: 81.2 % (ref 42.7–76)
NEUTROPHILS NFR BLD AUTO: 9.73 10*3/MM3 (ref 1.7–7)
NRBC BLD AUTO-RTO: 0 /100 WBC (ref 0–0.2)
PATH REPORT.FINAL DX SPEC: NORMAL
PATH REPORT.GROSS SPEC: NORMAL
PLATELET # BLD AUTO: 231 10*3/MM3 (ref 140–450)
PMV BLD AUTO: 10.9 FL (ref 6–12)
POTASSIUM SERPL-SCNC: 3.6 MMOL/L (ref 3.5–5.2)
POTASSIUM SERPL-SCNC: 3.6 MMOL/L (ref 3.5–5.2)
PROT SERPL-MCNC: 6.3 G/DL (ref 6–8.5)
RBC # BLD AUTO: 3.75 10*6/MM3 (ref 4.14–5.8)
SODIUM SERPL-SCNC: 136 MMOL/L (ref 136–145)
SODIUM SERPL-SCNC: 136 MMOL/L (ref 136–145)
TRIGL SERPL-MCNC: 67 MG/DL (ref 0–150)
VLDLC SERPL-MCNC: 15 MG/DL (ref 5–40)
WBC NRBC COR # BLD AUTO: 12.01 10*3/MM3 (ref 3.4–10.8)
WHOLE BLOOD HOLD COAG: NORMAL

## 2025-03-18 PROCEDURE — 94660 CPAP INITIATION&MGMT: CPT

## 2025-03-18 PROCEDURE — 25010000002 FUROSEMIDE PER 20 MG: Performed by: INTERNAL MEDICINE

## 2025-03-18 PROCEDURE — 80061 LIPID PANEL: CPT | Performed by: STUDENT IN AN ORGANIZED HEALTH CARE EDUCATION/TRAINING PROGRAM

## 2025-03-18 PROCEDURE — 82948 REAGENT STRIP/BLOOD GLUCOSE: CPT

## 2025-03-18 PROCEDURE — 25010000002 HYDROMORPHONE PER 4 MG: Performed by: STUDENT IN AN ORGANIZED HEALTH CARE EDUCATION/TRAINING PROGRAM

## 2025-03-18 PROCEDURE — 80053 COMPREHEN METABOLIC PANEL: CPT | Performed by: STUDENT IN AN ORGANIZED HEALTH CARE EDUCATION/TRAINING PROGRAM

## 2025-03-18 PROCEDURE — 63710000001 INSULIN LISPRO (HUMAN) PER 5 UNITS: Performed by: STUDENT IN AN ORGANIZED HEALTH CARE EDUCATION/TRAINING PROGRAM

## 2025-03-18 PROCEDURE — 25010000002 ENOXAPARIN PER 10 MG: Performed by: STUDENT IN AN ORGANIZED HEALTH CARE EDUCATION/TRAINING PROGRAM

## 2025-03-18 PROCEDURE — 85025 COMPLETE CBC W/AUTO DIFF WBC: CPT | Performed by: INTERNAL MEDICINE

## 2025-03-18 PROCEDURE — 63710000001 INSULIN GLARGINE PER 5 UNITS: Performed by: STUDENT IN AN ORGANIZED HEALTH CARE EDUCATION/TRAINING PROGRAM

## 2025-03-18 PROCEDURE — 83036 HEMOGLOBIN GLYCOSYLATED A1C: CPT | Performed by: STUDENT IN AN ORGANIZED HEALTH CARE EDUCATION/TRAINING PROGRAM

## 2025-03-18 PROCEDURE — 25010000002 HYDROMORPHONE 1 MG/ML SOLUTION: Performed by: INTERNAL MEDICINE

## 2025-03-18 PROCEDURE — 94799 UNLISTED PULMONARY SVC/PX: CPT

## 2025-03-18 PROCEDURE — 25810000003 SODIUM CHLORIDE 0.9 % SOLUTION: Performed by: STUDENT IN AN ORGANIZED HEALTH CARE EDUCATION/TRAINING PROGRAM

## 2025-03-18 PROCEDURE — 83735 ASSAY OF MAGNESIUM: CPT | Performed by: STUDENT IN AN ORGANIZED HEALTH CARE EDUCATION/TRAINING PROGRAM

## 2025-03-18 PROCEDURE — 71045 X-RAY EXAM CHEST 1 VIEW: CPT

## 2025-03-18 PROCEDURE — 25010000002 ONDANSETRON PER 1 MG: Performed by: STUDENT IN AN ORGANIZED HEALTH CARE EDUCATION/TRAINING PROGRAM

## 2025-03-18 PROCEDURE — 99024 POSTOP FOLLOW-UP VISIT: CPT | Performed by: STUDENT IN AN ORGANIZED HEALTH CARE EDUCATION/TRAINING PROGRAM

## 2025-03-18 RX ORDER — OXYCODONE HYDROCHLORIDE 5 MG/1
5 TABLET ORAL EVERY 6 HOURS PRN
Refills: 0 | Status: DISCONTINUED | OUTPATIENT
Start: 2025-03-18 | End: 2025-03-20 | Stop reason: HOSPADM

## 2025-03-18 RX ORDER — ACETAMINOPHEN 500 MG
1000 TABLET ORAL EVERY 8 HOURS
Status: DISCONTINUED | OUTPATIENT
Start: 2025-03-18 | End: 2025-03-20 | Stop reason: HOSPADM

## 2025-03-18 RX ORDER — LIDOCAINE 4 G/G
2 PATCH TOPICAL
Status: DISCONTINUED | OUTPATIENT
Start: 2025-03-18 | End: 2025-03-20 | Stop reason: HOSPADM

## 2025-03-18 RX ORDER — BISACODYL 10 MG
10 SUPPOSITORY, RECTAL RECTAL ONCE
Status: DISCONTINUED | OUTPATIENT
Start: 2025-03-19 | End: 2025-03-20 | Stop reason: HOSPADM

## 2025-03-18 RX ORDER — POLYETHYLENE GLYCOL 3350 17 G/17G
17 POWDER, FOR SOLUTION ORAL DAILY
Status: DISCONTINUED | OUTPATIENT
Start: 2025-03-18 | End: 2025-03-20 | Stop reason: HOSPADM

## 2025-03-18 RX ORDER — AMOXICILLIN 250 MG
2 CAPSULE ORAL NIGHTLY
Status: DISCONTINUED | OUTPATIENT
Start: 2025-03-18 | End: 2025-03-19

## 2025-03-18 RX ORDER — FUROSEMIDE 10 MG/ML
60 INJECTION INTRAMUSCULAR; INTRAVENOUS ONCE
Status: COMPLETED | OUTPATIENT
Start: 2025-03-18 | End: 2025-03-18

## 2025-03-18 RX ADMIN — OXYCODONE HYDROCHLORIDE 5 MG: 5 TABLET ORAL at 14:17

## 2025-03-18 RX ADMIN — HYDROCODONE BITARTRATE AND ACETAMINOPHEN 1 TABLET: 7.5; 325 TABLET ORAL at 08:49

## 2025-03-18 RX ADMIN — ONDANSETRON 4 MG: 2 INJECTION, SOLUTION INTRAMUSCULAR; INTRAVENOUS at 23:04

## 2025-03-18 RX ADMIN — ENOXAPARIN SODIUM 40 MG: 100 INJECTION SUBCUTANEOUS at 08:51

## 2025-03-18 RX ADMIN — HYDROMORPHONE HYDROCHLORIDE 1 MG: 1 INJECTION, SOLUTION INTRAMUSCULAR; INTRAVENOUS; SUBCUTANEOUS at 18:46

## 2025-03-18 RX ADMIN — ACETAMINOPHEN 1000 MG: 500 TABLET, FILM COATED ORAL at 11:14

## 2025-03-18 RX ADMIN — ONDANSETRON 4 MG: 2 INJECTION, SOLUTION INTRAMUSCULAR; INTRAVENOUS at 12:21

## 2025-03-18 RX ADMIN — KETOCONAZOLE 1 APPLICATION: 20 CREAM TOPICAL at 08:50

## 2025-03-18 RX ADMIN — SENNOSIDES AND DOCUSATE SODIUM 2 TABLET: 50; 8.6 TABLET ORAL at 20:21

## 2025-03-18 RX ADMIN — ATORVASTATIN CALCIUM 10 MG: 10 TABLET ORAL at 08:50

## 2025-03-18 RX ADMIN — SODIUM CHLORIDE 100 ML/HR: 9 INJECTION, SOLUTION INTRAVENOUS at 06:27

## 2025-03-18 RX ADMIN — PIOGLITAZONE HYDROCHLORIDE 45 MG: 30 TABLET ORAL at 08:49

## 2025-03-18 RX ADMIN — TAMSULOSIN HYDROCHLORIDE 0.8 MG: 0.4 CAPSULE ORAL at 20:21

## 2025-03-18 RX ADMIN — LATANOPROST 1 DROP: 50 SOLUTION/ DROPS OPHTHALMIC at 20:22

## 2025-03-18 RX ADMIN — FUROSEMIDE 60 MG: 10 INJECTION, SOLUTION INTRAMUSCULAR; INTRAVENOUS at 18:34

## 2025-03-18 RX ADMIN — HYDROMORPHONE HYDROCHLORIDE 0.5 MG: 1 INJECTION, SOLUTION INTRAMUSCULAR; INTRAVENOUS; SUBCUTANEOUS at 06:26

## 2025-03-18 RX ADMIN — INSULIN GLARGINE 40 UNITS: 100 INJECTION, SOLUTION SUBCUTANEOUS at 20:50

## 2025-03-18 RX ADMIN — Medication 10 ML: at 08:50

## 2025-03-18 RX ADMIN — POLYETHYLENE GLYCOL 3350 17 G: 17 POWDER, FOR SOLUTION ORAL at 14:17

## 2025-03-18 RX ADMIN — ACETAMINOPHEN 1000 MG: 500 TABLET, FILM COATED ORAL at 17:34

## 2025-03-18 RX ADMIN — METFORMIN HYDROCHLORIDE 2000 MG: 1000 TABLET, FILM COATED ORAL at 08:50

## 2025-03-18 RX ADMIN — ONDANSETRON 4 MG: 2 INJECTION, SOLUTION INTRAMUSCULAR; INTRAVENOUS at 06:25

## 2025-03-18 RX ADMIN — ENOXAPARIN SODIUM 40 MG: 100 INJECTION SUBCUTANEOUS at 20:21

## 2025-03-18 RX ADMIN — ASPIRIN 81 MG: 81 TABLET, COATED ORAL at 20:21

## 2025-03-18 RX ADMIN — ALLOPURINOL 100 MG: 100 TABLET ORAL at 20:21

## 2025-03-18 RX ADMIN — FUROSEMIDE 80 MG: 80 TABLET ORAL at 08:50

## 2025-03-18 RX ADMIN — ONDANSETRON 4 MG: 2 INJECTION, SOLUTION INTRAMUSCULAR; INTRAVENOUS at 18:46

## 2025-03-18 RX ADMIN — HYDROMORPHONE HYDROCHLORIDE 1 MG: 1 INJECTION, SOLUTION INTRAMUSCULAR; INTRAVENOUS; SUBCUTANEOUS at 23:04

## 2025-03-18 RX ADMIN — INSULIN LISPRO 2 UNITS: 100 INJECTION, SOLUTION INTRAVENOUS; SUBCUTANEOUS at 17:35

## 2025-03-18 RX ADMIN — Medication 10 ML: at 20:22

## 2025-03-18 RX ADMIN — HYDROMORPHONE HYDROCHLORIDE 1 MG: 1 INJECTION, SOLUTION INTRAMUSCULAR; INTRAVENOUS; SUBCUTANEOUS at 12:19

## 2025-03-18 RX ADMIN — HYDROMORPHONE HYDROCHLORIDE 0.5 MG: 1 INJECTION, SOLUTION INTRAMUSCULAR; INTRAVENOUS; SUBCUTANEOUS at 01:48

## 2025-03-18 RX ADMIN — IPRATROPIUM BROMIDE AND ALBUTEROL SULFATE 3 ML: .5; 3 SOLUTION RESPIRATORY (INHALATION) at 01:15

## 2025-03-18 RX ADMIN — LIDOCAINE 2 PATCH: 4 PATCH TOPICAL at 11:14

## 2025-03-18 NOTE — PROGRESS NOTES
LOS: 3 days   Patient Care Team:  Dereck Lemus MD as PCP - General  Dereck Lemus MD as PCP - Family Medicine    Chief Complaint:   Chief Complaint   Patient presents with    Abdominal Pain         Subjective    Today the patient is awake, alert, but he is somewhat agitated and very uncomfortable in bed.  He is fretful and fidgety and looks very uncomfortable.  He is also very anxious and concerned about how much pain he is having.  Fortunately he is not complaining of nausea or vomiting.  His pain medication does not seem to be holding his pain under as good of control this morning as it did previously.  I discussed this case with the general surgeon Dr. Pimentel who indicated this was a difficult surgery with a very inflamed gallbladder in general.  The nurse also notes that he requires oxygen or he desaturates easily.  Clearly his postoperative course is not as smooth and easy as it could have been because he delayed so long before coming into the hospital to start with.  Hopefully we can get him settled down today.    Interval History:     Patient Complaints: He is complaining of a lot of abdominal pain and discomfort.  He is also aware that he gets hypoxic without oxygen.  Patient Denies: He denies chest pain or cardiac symptoms, no nausea or vomiting, he is not sure about bowel movements  History taken from: patient chart RN I also discussed his case directly with the general surgeon who was also on the floor with same time I was.    Review of Systems:    All systems were reviewed and negative except for:  Constitution:  positive for fatigue, malaise, he is very agitated and fidgety in bed and seems very anxious., and See HPI  Gastrointestinal: positive for  bloating / distention, pain, and is particularly tender in his epigastric and right upper quadrant with minimal palpation.  Nonetheless he has active but high-pitched bowel sounds, and See HPI  Behavioral/Psych: positive for  anxiety,  excessive irritability, and he is very fretful and mildly agitated because of his abdominal discomfort.    Objective      Vital Signs  Temp:  [98 °F (36.7 °C)-99.2 °F (37.3 °C)] 98.9 °F (37.2 °C)  Pulse:  [] 109  Resp:  [18] 18  BP: (111-154)/(55-97) 154/97    I/O'S  Intake & Output (last 7 days)         03/11 0701 03/12 0700 03/12 0701 03/13 0700 03/13 0701 03/14 0700 03/14 0701  03/15 0700 03/15 0701 03/16 0700 03/16 0701 03/17 0700 03/17 0701 03/18 0700 03/18 0701 03/19 0700    P.O.       490     I.V.      1000 1206.7     Total Intake      1000 1696.7     Urine       440     Drains       112     Total Output       552     Net      +1000 +1144.7                 Urine Unmeasured Occurrence       1 x             Physical Exam:   General Appearance alert, appears stated age, interactive, fatigued, cooperative, pale, and anxious and fretful.  He is clearly concerned about how much pain he has in his abdomen he is very fidgety in bed as well.  Lungs clear to auscultation, respirations regular, respirations even, and respirations unlabored  Heart regular rhythm & normal rate  Abdomen normal bowel sounds and and they are somewhat high-pitched.  He is clearly acutely tender in his epigastric into his right upper quadrant and even into his right middle quadrant area.  He is much more tender and uncomfortable than I would have expected except for the fact that apparently this was a difficult surgery based on the report from the general surgeon.  Extremities no edema, no cyanosis, and no redness     Results Review:     I reviewed the patient's new clinical results.  I reviewed the patient's other test results and agree with the interpretation                     Results from last 7 days   Lab Units 03/16/25 2058 03/14/25  0611   SODIUM mmol/L 138 139   POTASSIUM mmol/L 3.6 3.8   CHLORIDE mmol/L 98 100   CO2 mmol/L 28.2 26.8   BUN mg/dL 27* 25*   CREATININE mg/dL 1.25 0.98   GLUCOSE mg/dL 175* 230*   CALCIUM  "mg/dL 9.8 10.4         Results from last 7 days   Lab Units 03/16/25 2058 03/14/25  0611   WBC 10*3/mm3 16.10* 9.22   HEMOGLOBIN g/dL 12.0* 13.8   HEMATOCRIT % 35.6* 42.5   PLATELETS 10*3/mm3 189 224                           No results found for: \"HGBA1C\"  Glucose   Date/Time Value Ref Range Status   03/18/2025 0556 132 (H) 70 - 130 mg/dL Final   03/17/2025 2022 163 (H) 70 - 130 mg/dL Final   03/17/2025 1549 191 (H) 70 - 130 mg/dL Final   03/17/2025 1045 153 (H) 70 - 130 mg/dL Final   03/17/2025 0545 156 (H) 70 - 130 mg/dL Final       eGFR   Date Value Ref Range Status   03/16/2025 58.9 (L) >60.0 mL/min/1.73 Final   03/14/2025 78.9 >60.0 mL/min/1.73 Final           Medication Review:   Scheduled Meds:allopurinol, 100 mg, Oral, Nightly  amLODIPine, 10 mg, Oral, Q24H  aspirin, 81 mg, Oral, Nightly  atorvastatin, 10 mg, Oral, Daily  enoxaparin sodium, 40 mg, Subcutaneous, BID  furosemide, 80 mg, Oral, Daily  insulin glargine, 80 Units, Subcutaneous, Nightly  insulin lispro, 2-9 Units, Subcutaneous, 4x Daily AC & at Bedtime  ketoconazole, 1 Application, Topical, Daily  latanoprost, 1 drop, Both Eyes, Nightly  losartan, 100 mg, Oral, Daily  metFORMIN, 2,000 mg, Oral, Daily With Breakfast  metoprolol succinate XL, 100 mg, Oral, Daily  pioglitazone, 45 mg, Oral, Daily  sodium chloride, 10 mL, Intravenous, Q12H  tamsulosin, 0.8 mg, Oral, Nightly      Continuous Infusions:sodium chloride, 100 mL/hr, Last Rate: 100 mL/hr (03/18/25 0627)      PRN Meds:.  acetaminophen **OR** acetaminophen **OR** acetaminophen    senna-docusate sodium **AND** polyethylene glycol **AND** bisacodyl **AND** bisacodyl    dextrose    dextrose    glucagon (human recombinant)    HYDROcodone-acetaminophen    HYDROcodone-acetaminophen    HYDROmorphone    HYDROmorphone    naloxone    ondansetron    ondansetron ODT    [COMPLETED] Insert Peripheral IV **AND** sodium chloride    sodium chloride    sodium chloride                Epigastric pain    Type 2 " diabetes mellitus with neurologic complication    Class 3 severe obesity due to excess calories with serious comorbidity and body mass index (BMI) of 40.0 to 44.9 in adult    Acute gangrenous cholecystitis    Essential hypertension    Benign prostatic hyperplasia with lower urinary tract symptoms    Obstructive sleep apnea treated with auto CPAP    Lumbar disc disease    High cholesterol    #1 Acute Cholecystitis-the patient's postoperative laparoscopic cholecystectomy but apparently was difficult because he had waited so long before he came into the hospital.  He remains on IV Zosyn.  He is having a lot of pain so his Dilaudid needs to be increased.  His level of tenderness is a bit of a concern.  I discussed the case with Dr. Pimentel who is aware of the situation.  Unfortunately his labs are not ready for this morning so I ordered them stat.    2.  Diabetes mellitus type 2-his blood sugars are reasonably well-controlled at this point.  I had the nurse give him half his dose of insulin last evening and his blood sugars are well-controlled today.  He has not eaten much so his sugars are likely to go up significantly when he does.    3.  Hypertension-his blood pressure continues to be surprisingly well-controlled despite his discomfort level    4.  Sleep apnea-he uses CPAP at home and he has CPAP is here in the hospital with them as well.  He clearly needs oxygen supplementation to keep his O2 sats up.  He is likely hypoventilating because of his abdominal discomfort and obesity.    5.  Lumbar disc disease-is not complaining of any back pain and is a noted he is fidgety and moving about in the bed frequently.    6.  BPH-no complaints of urination difficulties    7.  High cholesterol-amazingly enough some of his admission laboratory test have not been done yet.    8.  Mild nausea-he had some nausea coming in but this morning he describes only pain and no nausea at all.    9.  Morbid obesity-longstanding and  unfortunately is likely a complication to this whole process both preoperatively and postoperatively.    10.  Gout-he is on allopurinol    11.  ASCVD-fortunately is not complaining of any chest pain or cardiac like symptoms          Plan for disposition:Where: home and When:  When his pain is controlled and he is more stable in general.    Dereck Lemus MD  03/18/25  07:57 EDT          Time:

## 2025-03-18 NOTE — PLAN OF CARE
Problem: Adult Inpatient Plan of Care  Goal: Absence of Hospital-Acquired Illness or Injury  3/18/2025 0648 by Jaswinder Lentz RN  Outcome: Progressing  3/18/2025 0642 by Jaswinder Lentz RN  Outcome: Not Progressing  Intervention: Identify and Manage Fall Risk  Recent Flowsheet Documentation  Taken 3/18/2025 0626 by Jaswinder Lentz, RN  Safety Promotion/Fall Prevention:   safety round/check completed   room organization consistent   nonskid shoes/slippers when out of bed   lighting adjusted   fall prevention program maintained   clutter free environment maintained   assistive device/personal items within reach  Taken 3/18/2025 0420 by Jaswinder Lentz, RN  Safety Promotion/Fall Prevention:   safety round/check completed   room organization consistent   nonskid shoes/slippers when out of bed   lighting adjusted   fall prevention program maintained   clutter free environment maintained   assistive device/personal items within reach  Taken 3/18/2025 0218 by Jaswinder Lentz RN  Safety Promotion/Fall Prevention:   safety round/check completed   room organization consistent   nonskid shoes/slippers when out of bed   lighting adjusted   fall prevention program maintained   clutter free environment maintained   assistive device/personal items within reach  Taken 3/18/2025 0011 by Jaswinder Lentz, RN  Safety Promotion/Fall Prevention:   safety round/check completed   room organization consistent   nonskid shoes/slippers when out of bed   lighting adjusted   fall prevention program maintained   clutter free environment maintained   assistive device/personal items within reach  Taken 3/17/2025 2202 by Jaswinder Lentz, RN  Safety Promotion/Fall Prevention:   safety round/check completed   room organization consistent   nonskid shoes/slippers when out of bed   lighting adjusted   fall prevention program maintained   clutter free environment maintained   assistive  device/personal items within reach  Taken 3/17/2025 2025 by Jaswinder Lentz RN  Safety Promotion/Fall Prevention:   safety round/check completed   room organization consistent   nonskid shoes/slippers when out of bed   lighting adjusted   fall prevention program maintained   clutter free environment maintained   assistive device/personal items within reach  Intervention: Prevent Skin Injury  Recent Flowsheet Documentation  Taken 3/18/2025 0626 by Jaswinder Lentz RN  Body Position: position changed independently  Taken 3/18/2025 0420 by Jaswinder Lentz RN  Body Position: dangle, side of bed  Taken 3/18/2025 0218 by Jaswinder Lentz RN  Body Position: position changed independently  Taken 3/18/2025 0011 by Jaswinder Lentz RN  Body Position:   left   side-lying  Taken 3/17/2025 2202 by Jaswinder Lentz RN  Body Position: position changed independently  Taken 3/17/2025 2025 by Jaswinder Lentz RN  Body Position:   left   side-lying   position changed independently  Skin Protection:   incontinence pads utilized   transparent dressing maintained  Intervention: Prevent and Manage VTE (Venous Thromboembolism) Risk  Recent Flowsheet Documentation  Taken 3/17/2025 2025 by Jaswinder Lentz RN  VTE Prevention/Management: (Lovenox) other (see comments)  Intervention: Prevent Infection  Recent Flowsheet Documentation  Taken 3/18/2025 0626 by Jaswinder Lentz RN  Infection Prevention:   rest/sleep promoted   personal protective equipment utilized   hand hygiene promoted   environmental surveillance performed  Taken 3/18/2025 0420 by Jaswinder Lentz RN  Infection Prevention:   rest/sleep promoted   personal protective equipment utilized   hand hygiene promoted   environmental surveillance performed  Taken 3/18/2025 0218 by Jaswinder Lentz RN  Infection Prevention:   rest/sleep promoted   personal protective equipment utilized   hand  hygiene promoted   environmental surveillance performed  Taken 3/18/2025 0011 by Jaswinder Lentz, RN  Infection Prevention:   rest/sleep promoted   hand hygiene promoted   personal protective equipment utilized   environmental surveillance performed  Taken 3/17/2025 2202 by Jaswinder Lentz, ANASTACIA  Infection Prevention:   rest/sleep promoted   personal protective equipment utilized   hand hygiene promoted   environmental surveillance performed  Taken 3/17/2025 2025 by Jaswinder Lentz, ANASTACIA  Infection Prevention:   rest/sleep promoted   personal protective equipment utilized   hand hygiene promoted   environmental surveillance performed   Goal Outcome Evaluation:  Plan of Care Reviewed With: patient           Outcome Evaluation: AOx4, VSS, SR on telemetry monitor, O2 at 6 lpm via HFNC, home CPAP at 5 lpm not tolerated, c/o pain on right upper quadrant and nausea -PRN pain medications given, assist x1 to the chair, PORSHA drain output recorded, bed alarm on, call light within reach.

## 2025-03-18 NOTE — PLAN OF CARE
Problem: Adult Inpatient Plan of Care  Goal: Plan of Care Review  Outcome: Not Progressing  Flowsheets (Taken 3/18/2025 1512)  Progress: no change  Outcome Evaluation: Patient is alert and oriented x4, SR on tele. Patient has been on 6 L High flow nasal cannula or CPAP at 9 lpm (per RT). Reached out to Dr. Lemus to see if pulmonology needs to be consulted. Patient continues to have pain to right abdomen- see MAR for medication given. Bed alarm on, bed in lowest position, and call light within reach.  Plan of Care Reviewed With:   patient   spouse  Goal: Patient-Specific Goal (Individualized)  Outcome: Not Progressing  Goal: Absence of Hospital-Acquired Illness or Injury  Outcome: Not Progressing  Intervention: Identify and Manage Fall Risk  Recent Flowsheet Documentation  Taken 3/18/2025 1417 by Mony Chowdary RN  Safety Promotion/Fall Prevention:   activity supervised   assistive device/personal items within reach   clutter free environment maintained   fall prevention program maintained   lighting adjusted   nonskid shoes/slippers when out of bed   room organization consistent   safety round/check completed   toileting scheduled  Taken 3/18/2025 1219 by Mony Chowdary RN  Safety Promotion/Fall Prevention:   activity supervised   assistive device/personal items within reach   clutter free environment maintained   fall prevention program maintained   lighting adjusted   nonskid shoes/slippers when out of bed   room organization consistent   safety round/check completed   toileting scheduled  Taken 3/18/2025 1005 by Mony Chowdary RN  Safety Promotion/Fall Prevention:   activity supervised   assistive device/personal items within reach   clutter free environment maintained   fall prevention program maintained   lighting adjusted   nonskid shoes/slippers when out of bed   room organization consistent   safety round/check completed   toileting scheduled  Taken 3/18/2025 0849 by Mony Chowdary RN  Safety  Promotion/Fall Prevention:   activity supervised   assistive device/personal items within reach   clutter free environment maintained   fall prevention program maintained   lighting adjusted   nonskid shoes/slippers when out of bed   room organization consistent   safety round/check completed   toileting scheduled  Intervention: Prevent Skin Injury  Recent Flowsheet Documentation  Taken 3/18/2025 1417 by Mony Chowdary RN  Body Position:   dangle, side of bed   position changed independently   weight shifting  Taken 3/18/2025 1219 by Mony Chowdary RN  Body Position:   patient/family refused   position maintained   position changed independently   weight shifting   sitting up in bed  Taken 3/18/2025 1005 by Mony Chowdary RN  Body Position:   position changed independently   weight shifting   sitting up in bed  Taken 3/18/2025 0849 by Mony Chowdary RN  Body Position:   position changed independently   weight shifting   supine   heels elevated   legs elevated  Skin Protection:   incontinence pads utilized   transparent dressing maintained  Intervention: Prevent and Manage VTE (Venous Thromboembolism) Risk  Recent Flowsheet Documentation  Taken 3/18/2025 0849 by Mony Chowdary RN  VTE Prevention/Management:   bilateral   SCDs (sequential compression devices) on  Intervention: Prevent Infection  Recent Flowsheet Documentation  Taken 3/18/2025 1417 by Mony Chowdary RN  Infection Prevention:   environmental surveillance performed   equipment surfaces disinfected   hand hygiene promoted   personal protective equipment utilized   rest/sleep promoted   single patient room provided  Taken 3/18/2025 1219 by Mony Chowdary RN  Infection Prevention:   environmental surveillance performed   equipment surfaces disinfected   hand hygiene promoted   personal protective equipment utilized   rest/sleep promoted   single patient room provided  Taken 3/18/2025 1005 by Mony Chowdary RN  Infection Prevention:   environmental  surveillance performed   equipment surfaces disinfected   hand hygiene promoted   personal protective equipment utilized   rest/sleep promoted   single patient room provided  Taken 3/18/2025 0849 by Mony Chowdary RN  Infection Prevention:   environmental surveillance performed   equipment surfaces disinfected   personal protective equipment utilized   hand hygiene promoted   rest/sleep promoted   single patient room provided  Goal: Optimal Comfort and Wellbeing  Outcome: Not Progressing  Intervention: Monitor Pain and Promote Comfort  Recent Flowsheet Documentation  Taken 3/18/2025 1417 by Mony Chowdary RN  Pain Management Interventions: pain medication given  Taken 3/18/2025 1219 by Mony Chowdary RN  Pain Management Interventions: pain medication given  Taken 3/18/2025 1005 by Mony Chowdary RN  Pain Management Interventions:   pillow support provided   position adjusted  Taken 3/18/2025 0849 by Mony Chowdary RN  Pain Management Interventions: pain medication given  Intervention: Provide Person-Centered Care  Recent Flowsheet Documentation  Taken 3/18/2025 1417 by Mony Chowdary RN  Trust Relationship/Rapport:   care explained   choices provided   emotional support provided   empathic listening provided   questions answered   questions encouraged   reassurance provided   thoughts/feelings acknowledged  Taken 3/18/2025 0849 by Mony Chowdary RN  Trust Relationship/Rapport:   care explained   choices provided   emotional support provided   empathic listening provided   questions answered   questions encouraged   reassurance provided   thoughts/feelings acknowledged  Goal: Readiness for Transition of Care  Outcome: Not Progressing     Problem: Skin Injury Risk Increased  Goal: Skin Health and Integrity  Outcome: Not Progressing  Intervention: Optimize Skin Protection  Recent Flowsheet Documentation  Taken 3/18/2025 1417 by Mony Chowdary RN  Activity Management:   activity minimized   sitting, edge of bed  Head of  Bed (HOB) Positioning: HOB at 30-45 degrees  Taken 3/18/2025 1219 by Mony Chowdary RN  Activity Management: activity minimized  Head of Bed (HOB) Positioning: HOB at 30-45 degrees  Taken 3/18/2025 1005 by Mony Chowdary RN  Activity Management: activity minimized  Head of Bed (HOB) Positioning: HOB at 30-45 degrees  Taken 3/18/2025 0849 by Mony Chowdary RN  Activity Management: activity minimized  Pressure Reduction Techniques:   frequent weight shift encouraged   weight shift assistance provided  Head of Bed (HOB) Positioning: HOB at 20-30 degrees  Pressure Reduction Devices: positioning supports utilized  Skin Protection:   incontinence pads utilized   transparent dressing maintained     Problem: Comorbidity Management  Goal: Blood Glucose Level Within Target Range  Outcome: Not Progressing  Intervention: Monitor and Manage Glycemia  Recent Flowsheet Documentation  Taken 3/18/2025 0849 by Mony Chowdary RN  Medication Review/Management: medications reviewed     Problem: Fall Injury Risk  Goal: Absence of Fall and Fall-Related Injury  Outcome: Not Progressing  Intervention: Identify and Manage Contributors  Recent Flowsheet Documentation  Taken 3/18/2025 0849 by Mony Chowdary RN  Medication Review/Management: medications reviewed  Intervention: Promote Injury-Free Environment  Recent Flowsheet Documentation  Taken 3/18/2025 1417 by Mony Chowdary RN  Safety Promotion/Fall Prevention:   activity supervised   assistive device/personal items within reach   clutter free environment maintained   fall prevention program maintained   lighting adjusted   nonskid shoes/slippers when out of bed   room organization consistent   safety round/check completed   toileting scheduled  Taken 3/18/2025 1219 by Mony Chowdary RN  Safety Promotion/Fall Prevention:   activity supervised   assistive device/personal items within reach   clutter free environment maintained   fall prevention program maintained   lighting adjusted    nonskid shoes/slippers when out of bed   room organization consistent   safety round/check completed   toileting scheduled  Taken 3/18/2025 1005 by Mony Chowdary, RN  Safety Promotion/Fall Prevention:   activity supervised   assistive device/personal items within reach   clutter free environment maintained   fall prevention program maintained   lighting adjusted   nonskid shoes/slippers when out of bed   room organization consistent   safety round/check completed   toileting scheduled  Taken 3/18/2025 0849 by Mony Chowdary, RN  Safety Promotion/Fall Prevention:   activity supervised   assistive device/personal items within reach   clutter free environment maintained   fall prevention program maintained   lighting adjusted   nonskid shoes/slippers when out of bed   room organization consistent   safety round/check completed   toileting scheduled   Goal Outcome Evaluation:  Plan of Care Reviewed With: patient, spouse        Progress: no change  Outcome Evaluation: Patient is alert and oriented x4, SR on tele. Patient has been on 6 L High flow nasal cannula or CPAP at 9 lpm (per RT). Reached out to Dr. Lemus to see if pulmonology needs to be consulted. Patient continues to have pain to right abdomen- see MAR for medication given. Bed alarm on, bed in lowest position, and call light within reach.

## 2025-03-18 NOTE — PROGRESS NOTES
Was called to help add an extension cord to give pt o2 through his cpap. Pt had one on the cpap already. Placed pt on his cpap. Had to increase o2 to 9L through cpap.

## 2025-03-18 NOTE — PROGRESS NOTES
General Surgery Note    Summary:  79-year-old gentleman postoperative day 1 status post laparoscopic cholecystectomy with intraoperative cholangiogram for necrotizing cholecystitis    Interval Status:  Slightly confused this morning.  Has not been out of bed yet.  Tolerating clear liquids without nausea or vomiting.  Complaining of right upper quadrant pain.  Drain is serous.  Increased oxygen requirement, did not use CPAP overnight.    Physical Exam:    Not sick  Afebrile, heart rate 79, /53  Nonlabored breathing on 7 L nasal cannula  Abdomen soft, appropriately tender and right upper quadrant, mildly distended, trocar incisions clean and dry, drain: 10 cc serous    Labs:  Results from last 7 days   Lab Units 03/16/25 2058 03/14/25  0611   WBC 10*3/mm3 16.10* 9.22   HEMOGLOBIN g/dL 12.0* 13.8   PLATELETS 10*3/mm3 189 224     Results from last 7 days   Lab Units 03/18/25  1137 03/16/25 2058 03/14/25  0611   SODIUM mmol/L 136  136 138 139   POTASSIUM mmol/L 3.6  3.6 3.6 3.8   CHLORIDE mmol/L 102  102 98 100   CO2 mmol/L 24.3  24.3 28.2 26.8   BUN mg/dL 30*  30* 27* 25*   CREATININE mg/dL 1.36*  1.36* 1.25 0.98   CALCIUM mg/dL 8.7  8.7 9.8 10.4   BILIRUBIN mg/dL 0.5 0.6 0.4   ALK PHOS U/L 105 90 91   ALT (SGPT) U/L 19 8 10   AST (SGOT) U/L 23 14 14   GLUCOSE mg/dL 116*  116* 175* 230*       Plan:  -Advance to regular diet   -Multimodal pain control  -Bowel regimen  -Stop antibiotics  -Rest pulmonary toilet, incentive spirometer  -Delirium precautions  -Out of bed and ambulate, we will see what he can do his physical therapy, however I suspect he may need to go to rehab at the time of discharge  -Lovenox DVT prophylaxis            Glen Pimentel MD  [facility], [place], operated by [facility]  [address] [place] [address], [address]  [place], [place], [address]  P: [phone]  F: [phone]

## 2025-03-18 NOTE — SIGNIFICANT NOTE
03/18/25 1619   OTHER   Discipline physical therapist   Rehab Time/Intention   Session Not Performed other (see comments);patient/family declined evaluation  (Pt sleeping this AM when PT on unit - RN reports he did not sleep at all overnight. Pt adamantly refuses PT on check-back this PM despite education/encouragement from PT/RN/wife. PT will f/u tomorrow.)   Therapy Assessment/Plan (PT)   Criteria for Skilled Interventions Met (PT) yes   Recommendation   PT - Next Appointment 03/19/25

## 2025-03-19 ENCOUNTER — APPOINTMENT (OUTPATIENT)
Dept: CARDIOLOGY | Facility: HOSPITAL | Age: 80
End: 2025-03-19
Payer: MEDICARE

## 2025-03-19 LAB
ANION GAP SERPL CALCULATED.3IONS-SCNC: 11 MMOL/L (ref 5–15)
AORTIC DIMENSIONLESS INDEX: 0.39 (DI)
AV MEAN PRESS GRAD SYS DOP V1V2: 15.4 MMHG
AV VMAX SYS DOP: 266.6 CM/SEC
BH CV ECHO MEAS - ACS: 1.01 CM
BH CV ECHO MEAS - AO MAX PG: 28.4 MMHG
BH CV ECHO MEAS - AO ROOT AREA (BSA CORRECTED): 1.4 CM2
BH CV ECHO MEAS - AO ROOT DIAM: 3.2 CM
BH CV ECHO MEAS - AO V2 VTI: 56.3 CM
BH CV ECHO MEAS - AVA(I,D): 1.54 CM2
BH CV ECHO MEAS - EDV(CUBED): 187.9 ML
BH CV ECHO MEAS - EDV(MOD-SP2): 126 ML
BH CV ECHO MEAS - EDV(MOD-SP4): 128 ML
BH CV ECHO MEAS - EF(MOD-SP2): 65.1 %
BH CV ECHO MEAS - EF(MOD-SP4): 67.2 %
BH CV ECHO MEAS - ESV(CUBED): 44.9 ML
BH CV ECHO MEAS - ESV(MOD-SP2): 44 ML
BH CV ECHO MEAS - ESV(MOD-SP4): 42 ML
BH CV ECHO MEAS - FS: 38 %
BH CV ECHO MEAS - IVS/LVPW: 1.08 CM
BH CV ECHO MEAS - IVSD: 1.2 CM
BH CV ECHO MEAS - LAT PEAK E' VEL: 9.2 CM/SEC
BH CV ECHO MEAS - LV MASS(C)D: 276.2 GRAMS
BH CV ECHO MEAS - LV MAX PG: 6.4 MMHG
BH CV ECHO MEAS - LV MEAN PG: 2.8 MMHG
BH CV ECHO MEAS - LV V1 MAX: 126.1 CM/SEC
BH CV ECHO MEAS - LV V1 VTI: 22.2 CM
BH CV ECHO MEAS - LVIDD: 5.7 CM
BH CV ECHO MEAS - LVIDS: 3.6 CM
BH CV ECHO MEAS - LVOT AREA: 3.9 CM2
BH CV ECHO MEAS - LVOT DIAM: 2.23 CM
BH CV ECHO MEAS - LVPWD: 1.11 CM
BH CV ECHO MEAS - MED PEAK E' VEL: 8.4 CM/SEC
BH CV ECHO MEAS - MV A DUR: 0.1 SEC
BH CV ECHO MEAS - MV A MAX VEL: 137.1 CM/SEC
BH CV ECHO MEAS - MV DEC SLOPE: 493.4 CM/SEC2
BH CV ECHO MEAS - MV DEC TIME: 0.22 SEC
BH CV ECHO MEAS - MV E MAX VEL: 119 CM/SEC
BH CV ECHO MEAS - MV E/A: 0.87
BH CV ECHO MEAS - MV MAX PG: 7.3 MMHG
BH CV ECHO MEAS - MV MEAN PG: 3.5 MMHG
BH CV ECHO MEAS - MV P1/2T: 74.3 MSEC
BH CV ECHO MEAS - MV V2 VTI: 34.8 CM
BH CV ECHO MEAS - MVA(P1/2T): 3 CM2
BH CV ECHO MEAS - MVA(VTI): 2.49 CM2
BH CV ECHO MEAS - RAP SYSTOLE: 3 MMHG
BH CV ECHO MEAS - RVSP: 26 MMHG
BH CV ECHO MEAS - SV(LVOT): 86.9 ML
BH CV ECHO MEAS - SV(MOD-SP2): 82 ML
BH CV ECHO MEAS - SV(MOD-SP4): 86 ML
BH CV ECHO MEAS - TAPSE (>1.6): 2.6 CM
BH CV ECHO MEAS - TR MAX PG: 22.6 MMHG
BH CV ECHO MEAS - TR MAX VEL: 237.5 CM/SEC
BH CV ECHO MEASUREMENTS AVERAGE E/E' RATIO: 13.52
BH CV XLRA - TDI S': 16.9 CM/SEC
BUN SERPL-MCNC: 30 MG/DL (ref 8–23)
BUN/CREAT SERPL: 24 (ref 7–25)
CALCIUM SPEC-SCNC: 8.5 MG/DL (ref 8.6–10.5)
CHLORIDE SERPL-SCNC: 103 MMOL/L (ref 98–107)
CO2 SERPL-SCNC: 22 MMOL/L (ref 22–29)
CREAT SERPL-MCNC: 1.25 MG/DL (ref 0.76–1.27)
EGFRCR SERPLBLD CKD-EPI 2021: 58.9 ML/MIN/1.73
GLUCOSE BLDC GLUCOMTR-MCNC: 145 MG/DL (ref 70–130)
GLUCOSE BLDC GLUCOMTR-MCNC: 161 MG/DL (ref 70–130)
GLUCOSE BLDC GLUCOMTR-MCNC: 174 MG/DL (ref 70–130)
GLUCOSE BLDC GLUCOMTR-MCNC: 179 MG/DL (ref 70–130)
GLUCOSE BLDC GLUCOMTR-MCNC: 73 MG/DL (ref 70–130)
GLUCOSE BLDC GLUCOMTR-MCNC: 88 MG/DL (ref 70–130)
GLUCOSE SERPL-MCNC: 66 MG/DL (ref 65–99)
LEFT ATRIUM VOLUME INDEX: 20.8 ML/M2
LV EF BIPLANE MOD: 66.3 %
NT-PROBNP SERPL-MCNC: 3133 PG/ML (ref 0–1800)
POTASSIUM SERPL-SCNC: 3.4 MMOL/L (ref 3.5–5.2)
POTASSIUM SERPL-SCNC: 3.9 MMOL/L (ref 3.5–5.2)
QT INTERVAL: 366 MS
QTC INTERVAL: 459 MS
SINUS: 3.1 CM
SODIUM SERPL-SCNC: 136 MMOL/L (ref 136–145)

## 2025-03-19 PROCEDURE — 93005 ELECTROCARDIOGRAM TRACING: CPT | Performed by: INTERNAL MEDICINE

## 2025-03-19 PROCEDURE — 25010000002 ENOXAPARIN PER 10 MG: Performed by: STUDENT IN AN ORGANIZED HEALTH CARE EDUCATION/TRAINING PROGRAM

## 2025-03-19 PROCEDURE — 99222 1ST HOSP IP/OBS MODERATE 55: CPT | Performed by: INTERNAL MEDICINE

## 2025-03-19 PROCEDURE — 94799 UNLISTED PULMONARY SVC/PX: CPT

## 2025-03-19 PROCEDURE — 63710000001 INSULIN LISPRO (HUMAN) PER 5 UNITS: Performed by: STUDENT IN AN ORGANIZED HEALTH CARE EDUCATION/TRAINING PROGRAM

## 2025-03-19 PROCEDURE — 25010000002 FUROSEMIDE PER 20 MG: Performed by: INTERNAL MEDICINE

## 2025-03-19 PROCEDURE — 82948 REAGENT STRIP/BLOOD GLUCOSE: CPT

## 2025-03-19 PROCEDURE — 93306 TTE W/DOPPLER COMPLETE: CPT

## 2025-03-19 PROCEDURE — 83880 ASSAY OF NATRIURETIC PEPTIDE: CPT | Performed by: INTERNAL MEDICINE

## 2025-03-19 PROCEDURE — 25510000001 PERFLUTREN 6.52 MG/ML SUSPENSION 2 ML VIAL: Performed by: INTERNAL MEDICINE

## 2025-03-19 PROCEDURE — 97162 PT EVAL MOD COMPLEX 30 MIN: CPT

## 2025-03-19 PROCEDURE — 63710000001 INSULIN GLARGINE PER 5 UNITS: Performed by: STUDENT IN AN ORGANIZED HEALTH CARE EDUCATION/TRAINING PROGRAM

## 2025-03-19 PROCEDURE — 80048 BASIC METABOLIC PNL TOTAL CA: CPT | Performed by: STUDENT IN AN ORGANIZED HEALTH CARE EDUCATION/TRAINING PROGRAM

## 2025-03-19 PROCEDURE — 99024 POSTOP FOLLOW-UP VISIT: CPT | Performed by: STUDENT IN AN ORGANIZED HEALTH CARE EDUCATION/TRAINING PROGRAM

## 2025-03-19 PROCEDURE — 84132 ASSAY OF SERUM POTASSIUM: CPT | Performed by: INTERNAL MEDICINE

## 2025-03-19 PROCEDURE — 93306 TTE W/DOPPLER COMPLETE: CPT | Performed by: INTERNAL MEDICINE

## 2025-03-19 PROCEDURE — 97530 THERAPEUTIC ACTIVITIES: CPT

## 2025-03-19 PROCEDURE — 93010 ELECTROCARDIOGRAM REPORT: CPT | Performed by: INTERNAL MEDICINE

## 2025-03-19 RX ORDER — FUROSEMIDE 10 MG/ML
80 INJECTION INTRAMUSCULAR; INTRAVENOUS ONCE
Status: COMPLETED | OUTPATIENT
Start: 2025-03-19 | End: 2025-03-19

## 2025-03-19 RX ORDER — POTASSIUM CHLORIDE 1500 MG/1
40 TABLET, EXTENDED RELEASE ORAL EVERY 4 HOURS
Status: COMPLETED | OUTPATIENT
Start: 2025-03-19 | End: 2025-03-19

## 2025-03-19 RX ADMIN — PERFLUTREN 3 ML: 6.52 INJECTION, SUSPENSION INTRAVENOUS at 15:14

## 2025-03-19 RX ADMIN — POLYETHYLENE GLYCOL 3350 17 G: 17 POWDER, FOR SOLUTION ORAL at 09:14

## 2025-03-19 RX ADMIN — ASPIRIN 81 MG: 81 TABLET, COATED ORAL at 21:46

## 2025-03-19 RX ADMIN — ACETAMINOPHEN 1000 MG: 500 TABLET, FILM COATED ORAL at 17:46

## 2025-03-19 RX ADMIN — ENOXAPARIN SODIUM 40 MG: 100 INJECTION SUBCUTANEOUS at 21:46

## 2025-03-19 RX ADMIN — INSULIN LISPRO 2 UNITS: 100 INJECTION, SOLUTION INTRAVENOUS; SUBCUTANEOUS at 21:46

## 2025-03-19 RX ADMIN — OXYCODONE HYDROCHLORIDE 5 MG: 5 TABLET ORAL at 00:32

## 2025-03-19 RX ADMIN — PIOGLITAZONE HYDROCHLORIDE 45 MG: 30 TABLET ORAL at 09:14

## 2025-03-19 RX ADMIN — POTASSIUM CHLORIDE 40 MEQ: 1500 TABLET, EXTENDED RELEASE ORAL at 09:14

## 2025-03-19 RX ADMIN — ACETAMINOPHEN 1000 MG: 500 TABLET, FILM COATED ORAL at 01:23

## 2025-03-19 RX ADMIN — LIDOCAINE 2 PATCH: 4 PATCH TOPICAL at 09:11

## 2025-03-19 RX ADMIN — FUROSEMIDE 80 MG: 10 INJECTION, SOLUTION INTRAMUSCULAR; INTRAVENOUS at 12:05

## 2025-03-19 RX ADMIN — FUROSEMIDE 80 MG: 80 TABLET ORAL at 09:13

## 2025-03-19 RX ADMIN — Medication 10 ML: at 09:16

## 2025-03-19 RX ADMIN — ACETAMINOPHEN 1000 MG: 500 TABLET, FILM COATED ORAL at 09:15

## 2025-03-19 RX ADMIN — Medication 10 ML: at 21:47

## 2025-03-19 RX ADMIN — METFORMIN HYDROCHLORIDE 2000 MG: 1000 TABLET, FILM COATED ORAL at 09:14

## 2025-03-19 RX ADMIN — OXYCODONE HYDROCHLORIDE 5 MG: 5 TABLET ORAL at 21:46

## 2025-03-19 RX ADMIN — ALLOPURINOL 100 MG: 100 TABLET ORAL at 21:46

## 2025-03-19 RX ADMIN — INSULIN GLARGINE 80 UNITS: 100 INJECTION, SOLUTION SUBCUTANEOUS at 21:46

## 2025-03-19 RX ADMIN — POTASSIUM CHLORIDE 40 MEQ: 1500 TABLET, EXTENDED RELEASE ORAL at 12:04

## 2025-03-19 RX ADMIN — KETOCONAZOLE 1 APPLICATION: 20 CREAM TOPICAL at 09:17

## 2025-03-19 RX ADMIN — ATORVASTATIN CALCIUM 10 MG: 10 TABLET ORAL at 09:12

## 2025-03-19 RX ADMIN — ENOXAPARIN SODIUM 40 MG: 100 INJECTION SUBCUTANEOUS at 09:13

## 2025-03-19 RX ADMIN — INSULIN LISPRO 2 UNITS: 100 INJECTION, SOLUTION INTRAVENOUS; SUBCUTANEOUS at 17:46

## 2025-03-19 RX ADMIN — METOPROLOL SUCCINATE 100 MG: 100 TABLET, EXTENDED RELEASE ORAL at 09:14

## 2025-03-19 RX ADMIN — AMLODIPINE BESYLATE 10 MG: 10 TABLET ORAL at 09:14

## 2025-03-19 RX ADMIN — TAMSULOSIN HYDROCHLORIDE 0.8 MG: 0.4 CAPSULE ORAL at 21:46

## 2025-03-19 RX ADMIN — LOSARTAN POTASSIUM 100 MG: 100 TABLET, FILM COATED ORAL at 09:14

## 2025-03-19 NOTE — PLAN OF CARE
"Goal Outcome Evaluation:  Plan of Care Reviewed With: patient           Outcome Evaluation: 80yo obese white male admitted 3/16/25 due to epigastric pain x 3 days, found ot have gallstones; now s/p lap neelima 3/17/25. PMH Includes diabetes, hbp, L DDD, L LE weakness, gout, back surgery. PLOF: Pt lives at home with spouse with 4-5 ALICIA and optional inside stairs. He reports he amb without AD and was independent with ADLs and IADLs including cooking and driving. He is limited now by generalized weakness and decreased activity tolerance as well as expected post-op discomfort. Today, he was found resting in bed, he adamantly refused PT services again. Pt req sig encouragement from this PT and pt's nurse to partipate. He stated he was 80yo and had hbp and diabetes as well as O2 need for reqasons he could not participate in therapy. He eventually and reluctantly agreed to participate. He req min A to sit EOB with vc for sequencing. O2 sats decreased to 80% but increased to 90 with sitting EOB x 1-2 min. He impulsively stood from EOB (prior to gt belt being applied) and refused r wx since he doesnt use one at home. Pt took several small shuffled sidesteps toward HOB with 10L O2; unsteady but no overt LOB, impulsive. O2 88% after sidesteps but increased to 90% quckly after sitting EOB. He returned L sidelying with min A to raise legs back into bed. He wanted to let staff know that he was \"not comfortable\" yet but was \"getting there\" after returning to bed. He may benefit from skilled PT services to address functional deficits and prepare for d/c home. Pt's noncompliance and uncooperativeness will limit his rehab potential.    Anticipated Discharge Disposition (PT): home with assist, home with home health                        "

## 2025-03-19 NOTE — CONSULTS
Cardiology History & Physical / Consultation      Patient Name: Will Garcia  Age/Sex: 79 y.o. male  : 1945  MRN: [id number]    Date of Admission: 3/16/2025  Date of Encounter Visit: 25  Encounter Provider: Zay Anderson MD  Referring Provider: No ref. provider found  Place of Service: [facility] CARDIOLOGY  Patient Care Team:  Dereck Lemus MD as PCP - General (Internal Medicine)  Zay Anderson MD as Consulting Physician (Cardiology)          Subjective:     Chief Complaint: CHF    Reason for consultation:    History of Present Illness:  Will Garcia is a 79 y.o. male seen by me in office in 2018    In early  he initially presented for preop cardiac clearance.  Given his multiple risk factors including hypertension, hyperlipidemia and diabetes, as well as an abnormal EKG showing a left anterior fascicular block with an incomplete right bundle branch block,  he was sent for further evaluation with an echocardiogram and nuclear perfusion study.  His stress test came back abnormal, and underwent cardiac cath.  He was found to have significant LAD disease, and a drug-eluting stent was placed.  In his last follow-up in 2018 he was once again seeking preoperative clearance for his lumbar surgery.  At that time he had no complaints of chest pain, pressure, or tightness.  He reported compliance with medication, diet and exercise, and was cleared for surgery.    He presented to our ER on 3/16 with complaints of abdominal pain X3 days, for which he had been previously seen at a [facility] and diagnosed with gallstones.  He underwent laparoscopic cholecystectomy with intraoperative cholangiogram.  He has been experiencing increased oxygen demand since last night, so a chest x-ray was ordered, and was suggestive of CHF.  He was given IV Lasix with a poor response.  We are being asked to see in consult for the evaluation of CHF.  Troponins were drawn  upon admission,  initial HS Trop 16, repeat 19  BNP 3133    EKG:  Sinus rhythm HR 97  Multiform ventricular premature complexes  Nonspecific IVCD with LAD  Nonspecific T abnormalities, lateral leads    Echocardiogram 5/15/2018  Left ventricular systolic function is normal. Estimated EF = 61%.  Left ventricular wall thickness is consistent with mild-to-moderate concentric hypertrophy.    Stress test 5/15/2018  Left ventricular ejection fraction is normal (Calculated EF = 56%).  Myocardial perfusion imaging indicates a medium-sized, moderately severe area of ischemia located in the anterior wall and apex.  Impressions are consistent with an intermediate risk study.  LVEF with stress is 69%.    Cardiac Cath 5/17/2019  1. Severe single vessel coronary artery disease status post successful PCI of the mid LAD     Past Medical History:  Past Medical History:   Diagnosis Date    Arthritis     Cancer     Cataract     Chronic kidney disease     Chronic pain disorder     Coronary artery disease     Enlarged prostate     Essential hypertension     Hard to intubate     History of kidney stones     Injury of back     Low back pain     Lumbosacral disc disease     Lymph edema     BLE    Neuropathy     BLE    HELEN (obstructive sleep apnea) treated with auto CPAP 04/02/2010    Overnight polysomnogram.  Weight 254 pounds.  Severe HELEN with AHI 41.8 events per hour.  Sleep-related hypoxia also present.  Auto CPAP prescribed.    Osteoporosis     Spinal stenosis     Type 2 diabetes mellitus with diabetic neuropathy, with long-term current use of insulin        Past Surgical History:   Procedure Laterality Date    CARDIAC CATHETERIZATION Left 5/17/2018    Procedure: Cardiac Catheterization/Vascular Study;  Surgeon: Kaitlin Barney MD;  Location: [facility] CATH INVASIVE LOCATION;  Service: Cardiovascular    CARDIAC CATHETERIZATION N/A 5/17/2018    Procedure: Stent LACEY coronary;  Surgeon: Kaitlin Barney MD;  Location: [facility] CATH INVASIVE  LOCATION;  Service: Cardiovascular    CATARACT EXTRACTION      CHOLECYSTECTOMY WITH INTRAOPERATIVE CHOLANGIOGRAM N/A 3/17/2025    Procedure: Laparoscopic cholecystectomy with cholangiogram;  Surgeon: Glen Pimentel MD;  Location:  [facility] OR;  Service: General;  Laterality: N/A;    COLONOSCOPY N/A 4/3/2018    Procedure: COLONOSCOPY TO CECUM;  Surgeon: Royal Metcalf MD;  Location: [facility] ENDOSCOPY;  Service: General    EPIDURAL BLOCK      KIDNEY STONE SURGERY      LUMBAR DISCECTOMY Left 2/27/2019    Procedure: L4-5 lumbar decompression;  Surgeon: Darío Chowdhury MD;  Location: [facility] MAIN OR;  Service: Neurosurgery    SHOULDER ARTHROSCOPY Left     TONSILLECTOMY         Home Medications:   Medications Prior to Admission   Medication Sig Dispense Refill Last Dose/Taking    allopurinol (ZYLOPRIM) 100 MG tablet Take 1 tablet by mouth Every Night.   Taking    amLODIPine (NORVASC) 10 MG tablet    Taking    aspirin 81 MG EC tablet Take 1 tablet by mouth Every Night. PT HOLDING FOR SURGERY   Taking    atorvastatin (LIPITOR) 10 MG tablet Take 1 tablet by mouth Take As Directed. MON, WED AND FRIDAY   Taking    Dulaglutide (TRULICITY SC) Inject 1 mL under the skin 1 (One) Time Per Week. TAKES EVERY TUESDAY   Taking    fluticasone (FLONASE) 50 MCG/ACT nasal spray Administer 2 sprays into the nostril(s) as directed by provider As Needed. Administer 2 sprays in each nostril for each dose.   Taking As Needed    furosemide (LASIX) 80 MG tablet Take 1 tablet by mouth Daily. Resume on 5/18 (Patient taking differently: Take 1 tablet by mouth Daily.)   Taking Differently    insulin glargine (LANTUS) 100 UNIT/ML injection Inject 80 Units under the skin into the appropriate area as directed Every Night.   Taking    ketoconazole (NIZORAL) 2 % cream Apply 1 Application topically to the appropriate area as directed Daily.   Taking    latanoprost (XALATAN) 0.005 % ophthalmic solution Administer 1 drop to both eyes Every Night.    "Taking    losartan (COZAAR) 100 MG tablet Take 1 tablet by mouth Daily.   Taking    metFORMIN (GLUCOPHAGE) 500 MG tablet Take 4 tablets by mouth Daily With Breakfast. PT TOOK ONLY 1000 MG YESTERDAY MORNING    Resume on 5/19   Taking    metoprolol succinate XL (TOPROL-XL) 100 MG 24 hr tablet Take 1 tablet by mouth Daily.   Taking    NIFEdipine XL (PROCARDIA XL) 60 MG 24 hr tablet Take 1 tablet by mouth Every Night.   Taking    pioglitazone (ACTOS) 45 MG tablet Take 1 tablet by mouth Daily.   Taking    tamsulosin (FLOMAX) 0.4 MG capsule 24 hr capsule Take 2 capsules by mouth Every Night.   Taking    Vitamin D, Cholecalciferol, (CHOLECALCIFEROL) 400 units tablet Take 1 tablet by mouth Daily.   Taking    BD INSULIN SYRINGE U/F 31G X 5/16\" 1 ML misc        HYDROcodone-acetaminophen (NORCO) 5-325 MG per tablet Take 1 tablet by mouth Every 6 (Six) Hours As Needed for Moderate Pain for up to 12 doses. 12 tablet 0     ondansetron ODT (ZOFRAN-ODT) 4 MG disintegrating tablet Place 1 tablet on the tongue 4 (Four) Times a Day As Needed for Nausea or Vomiting. 15 tablet 0        Allergies:  Allergies   Allergen Reactions    Ceclor [Cefaclor] Swelling       Past Social History:  Social History     Socioeconomic History    Marital status:      Spouse name: Melanie    Number of children: 2    Years of education: 21   Tobacco Use    Smoking status: Never    Smokeless tobacco: Never   Vaping Use    Vaping status: Never Used   Substance and Sexual Activity    Alcohol use: No    Drug use: No    Sexual activity: Defer       Past Family History: History reviewed. No pertinent family history.   Family History   Problem Relation Age of Onset    Diabetes Other     Stroke Other     Heart disease Father     Diabetes Maternal Grandmother     Diabetes Maternal Grandfather     No Known Problems Paternal Grandmother     No Known Problems Paternal Grandfather     Malig Hyperthermia Neg Hx        Review of Systems        Objective: "     Objective:  Temp:  [97.7 °F (36.5 °C)-98.2 °F (36.8 °C)] 97.9 °F (36.6 °C)  Heart Rate:  [79-96] 87  Resp:  [20] 20  BP: ()/(48-76) 146/74    Intake/Output Summary (Last 24 hours) at 3/19/2025 1111  Last data filed at 3/19/2025 0730  Gross per 24 hour   Intake 560 ml   Output 1390 ml   Net -830 ml     Body mass index is 42.57 kg/m².      03/19/25  0730   Weight: 127 kg (280 lb)           Physical Exam:   Vitals reviewed.   Constitutional:       Appearance: Not in distress.   Pulmonary:      Breath sounds: Normal breath sounds.   Cardiovascular:      Normal rate. Regular rhythm. Normal S1. Normal S2.       Murmurs: There is no murmur.      No gallop.  No click. No rub.   Pulses:     Intact distal pulses.   Edema:     Peripheral edema absent.   Abdominal:      General: There is distension.          Labs:   Lab Review:     Results from last 7 days   Lab Units 03/19/25  0637 03/18/25  1137 03/16/25 2058 03/14/25  0611   SODIUM mmol/L 136 136  136 138 139   POTASSIUM mmol/L 3.4* 3.6  3.6 3.6 3.8   CHLORIDE mmol/L 103 102  102 98 100   CO2 mmol/L 22.0 24.3  24.3 28.2 26.8   BUN mg/dL 30* 30*  30* 27* 25*   CREATININE mg/dL 1.25 1.36*  1.36* 1.25 0.98   GLUCOSE mg/dL 66 116*  116* 175* 230*   CALCIUM mg/dL 8.5* 8.7  8.7 9.8 10.4   AST (SGOT) U/L  --  23 14 14   ALT (SGPT) U/L  --  19 8 10     Results from last 7 days   Lab Units 03/16/25  2229 03/16/25 2058 03/14/25  0728   HSTROP T ng/L 19 16 13     Results from last 7 days   Lab Units 03/18/25  1148   WBC 10*3/mm3 12.01*   HEMOGLOBIN g/dL 10.8*   HEMATOCRIT % 34.3*   PLATELETS 10*3/mm3 231         Results from last 7 days   Lab Units 03/18/25  1137   CHOLESTEROL mg/dL 90     Results from last 7 days   Lab Units 03/18/25  1137   MAGNESIUM mg/dL 2.1     Results from last 7 days   Lab Units 03/18/25  1137   CHOLESTEROL mg/dL 90   TRIGLYCERIDES mg/dL 67   HDL CHOL mg/dL 39*   LDL CHOL mg/dL 36     Results from last 7 days   Lab Units 03/19/25  0637    PROBNP pg/mL 3,133.0*                 PREVIOUS EKG:          EKG:             Assessment:       Epigastric pain    Type 2 diabetes mellitus with neurologic complication    Essential hypertension    Benign prostatic hyperplasia with lower urinary tract symptoms    Class 3 severe obesity due to excess calories with serious comorbidity and body mass index (BMI) of 40.0 to 44.9 in adult    Obstructive sleep apnea treated with auto CPAP    Acute gangrenous cholecystitis    Lumbar disc disease    High cholesterol    Cholelithiasis        Plan:     1.  Increasing shortness of breath concern of fluid overload.  I would do an echocardiogram to reassess since we have a look since 2018.  Will also give another dose of Lasix his creatinine has improved.  2.  Status postcholecystectomy.  3.  His abdomen is quite distended today and is nontender.  Will defer to surgery.  4.  Will follow and see what evolves.    Thank you for allowing me to participate in the care of Will JING Garcia. Feel free to contact me directly with any further questions or concerns.    Zay Anderson MD  [facility]  03/19/25  11:11 EDT

## 2025-03-19 NOTE — THERAPY EVALUATION
Patient Name: Will Garcia  : 1945    MRN: [id number]                              Today's Date: 3/19/2025       Admit Date: 3/16/2025    Visit Dx:     ICD-10-CM ICD-9-CM   1. Acute cholecystitis  K81.0 575.0   2. Epigastric pain  R10.13 789.06   3. Gallstones  K80.20 574.20   4. Cholecystitis  K81.9 575.10     Patient Active Problem List   Diagnosis    Type 2 diabetes mellitus with neurologic complication    Essential hypertension    Benign prostatic hyperplasia with lower urinary tract symptoms    Class 3 severe obesity due to excess calories with serious comorbidity and body mass index (BMI) of 40.0 to 44.9 in adult    Obstructive sleep apnea treated with auto CPAP    Abnormal nuclear stress test    Left leg weakness    Epigastric pain    Acute gangrenous cholecystitis    Lumbar disc disease    High cholesterol    Cholelithiasis     Past Medical History:   Diagnosis Date    Arthritis     Cancer     Cataract     Chronic kidney disease     Chronic pain disorder     Coronary artery disease     Enlarged prostate     Essential hypertension     Hard to intubate     History of kidney stones     Injury of back     Low back pain     Lumbosacral disc disease     Lymph edema     BLE    Neuropathy     BLE    HELEN (obstructive sleep apnea) treated with auto CPAP 2010    Overnight polysomnogram.  Weight 254 pounds.  Severe HELEN with AHI 41.8 events per hour.  Sleep-related hypoxia also present.  Auto CPAP prescribed.    Osteoporosis     Spinal stenosis     Type 2 diabetes mellitus with diabetic neuropathy, with long-term current use of insulin      Past Surgical History:   Procedure Laterality Date    CARDIAC CATHETERIZATION Left 2018    Procedure: Cardiac Catheterization/Vascular Study;  Surgeon: Kaitlin Barney MD;  Location:  [facility] INVASIVE LOCATION;  Service: Cardiovascular    CARDIAC CATHETERIZATION N/A 2018    Procedure: Stent LACEY coronary;  Surgeon: Kaitlin Barney MD;  [facility];  Service: Cardiovascular    CATARACT EXTRACTION      CHOLECYSTECTOMY WITH INTRAOPERATIVE CHOLANGIOGRAM N/A 3/17/2025    Procedure: Laparoscopic cholecystectomy with cholangiogram;  Surgeon: Glen Pimentel MD;  Location: [facility] MAIN OR;  Service: General;  Laterality: N/A;    COLONOSCOPY N/A 4/3/2018    Procedure: COLONOSCOPY TO CECUM;  Surgeon: Royal Metcalf MD;  Location: [facility] ENDOSCOPY;  Service: General    EPIDURAL BLOCK      KIDNEY STONE SURGERY      LUMBAR DISCECTOMY Left 2/27/2019    Procedure: L4-5 lumbar decompression;  Surgeon: Darío Chowdhury MD;  Location: [facility] MAIN OR;  Service: Neurosurgery    SHOULDER ARTHROSCOPY Left     TONSILLECTOMY        General Information       Row Name 03/19/25 1034          Physical Therapy Time and Intention    Document Type evaluation  -DJ     Mode of Treatment individual therapy;physical therapy  -DJ       Row Name 03/19/25 1034          General Information    Patient Profile Reviewed yes  -DJ     Prior Level of Function independent:;driving;cooking;ADL's;community mobility  no AD  -DJ     Existing Precautions/Restrictions fall;oxygen therapy device and L/min  10-11L hiflow  -DJ     Barriers to Rehab cognitive status;uncooperative  -DJ       Row Name 03/19/25 1034          Living Environment    Current Living Arrangements home  -DJ     People in Home spouse  -DJ       Row Name 03/19/25 1034          Home Main Entrance    Number of Stairs, Main Entrance four;five  -DJ       Row Name 03/19/25 1034          Stairs Within Home, Primary    Stairs, Within Home, Primary optional stairs inside  -DJ       Row Name 03/19/25 1034          Cognition    Orientation Status (Cognition) oriented x 4  -DJ       Row Name 03/19/25 1034          Safety Issues/Impairments Affecting Functional Mobility    Safety Issues Affecting Function (Mobility) judgment;safety precaution awareness;impulsivity;safety precautions follow-through/compliance  -DJ     Impairments  Affecting Function (Mobility) balance;cognition;endurance/activity tolerance;strength;shortness of breath  -DJ     Cognitive Impairments, Mobility Safety/Performance judgment;safety precaution awareness;impulsivity;safety precaution follow-through  -DJ     Comment, Safety Issues/Impairments (Mobility) nonskid socks; pt stood impulsively prior to gt belt being applied  -DJ               User Key  (r) = Recorded By, (t) = Taken By, (c) = Cosigned By      Initials Name Provider Type    DJ Belle Felix, PT Physical Therapist                   Mobility       Row Name 03/19/25 1037          Bed Mobility    Bed Mobility supine-sit;sit-supine  -DJ     Supine-Sit [place] (Bed Mobility) minimum assist (75% patient effort);verbal cues  -DJ     Sit-Supine [place] (Bed Mobility) minimum assist (75% patient effort);verbal cues  -DJ     Assistive Device (Bed Mobility) bed rails;leg   -DJ     Comment, (Bed Mobility) vc to puch up on bedrail, min A to raise les back into bed  -DJ       Row Name 03/19/25 1037          Transfers    Comment, (Transfers) sit/stand from EOB  -DJ       Row Name 03/19/25 1037          Bed-Chair Transfer    Bed-Chair Simpson (Transfers) not tested  -DJ       Row Name 03/19/25 1037          Sit-Stand Transfer    Sit-Stand [place] (Transfers) contact guard;verbal cues  -DJ     Assistive Device (Sit-Stand Transfers) walker, front-wheeled  -DJ     Comment, (Sit-Stand Transfer) stood impulsively prior to gt belt being applied  -DJ       Row Name 03/19/25 1037          Gait/Stairs (Locomotion)    [place] Level (Gait) contact guard;minimum assist (75% patient effort);verbal cues  -DJ     Assistive Device (Gait) other (see comments)  pt refused AD  -DJ     Distance in Feet (Gait) 3  -DJ     Deviations/Abnormal Patterns (Gait) festinating/shuffling;stride length decreased  -DJ     Bilateral Gait Deviations forward flexed posture  -DJ     [place] Level (Stairs) not tested  -DJ      Comment, (Gait/Stairs) Pt took several small shuffled sidesteps toward HOB with 10L O2; unsteady but no overt LOB, impulsive. O2 88% after sidesteps but increased to 90% quckly after sitting EOB  -DJ               User Key  (r) = Recorded By, (t) = Taken By, (c) = Cosigned By      Initials Name Provider Type    Belle Corrigan, PT Physical Therapist                   Obj/Interventions       Row Name 03/19/25 1040          Range of Motion Comprehensive    Comment, General Range of Motion grossly WFL  -DJ       Row Name 03/19/25 1040          Strength Comprehensive (MMT)    Comment, General Manual Muscle Testing (MMT) Assessment moves all 4s, generally weak  -DJ       Row Name 03/19/25 1040          Motor Skills    Motor Skills functional endurance  -DJ     Functional Endurance poor  -DJ       Row Name 03/19/25 1040          Advanced Stepping/Walking Interventions    Stepping/Walking Interventions side stepping  -DJ       Row Name 03/19/25 1040          Balance    Balance Assessment standing static balance;standing dynamic balance  -DJ     Static Standing Balance contact guard  -DJ     Dynamic Standing Balance contact guard;minimal assist  -DJ     Position/Device Used, Standing Balance supported;other (see comments)  no AD  -DJ     Balance Interventions sitting;standing;sit to stand;supported;weight shifting activity  -DJ     Comment, Balance no LOB  -DJ       Row Name 03/19/25 1040          Sensory Assessment (Somatosensory)    Sensory Assessment (Somatosensory) not tested  -DJ               User Key  (r) = Recorded By, (t) = Taken By, (c) = Cosigned By      Initials Name Provider Type    Belle Corrigan, PT Physical Therapist                   Goals/Plan       Row Name 03/19/25 1052          Bed Mobility Goal 1 (PT)    Activity/Assistive Device (Bed Mobility Goal 1, PT) sit to supine;supine to sit  -DJ     [place] Level/Cues Needed (Bed Mobility Goal 1, PT) modified independence;verbal cues required  -DJ      "Time Frame (Bed Mobility Goal 1, PT) 10 days  -DJ       Row Name 03/19/25 1052          Transfer Goal 1 (PT)    Activity/Assistive Device (Transfer Goal 1, PT) sit-to-stand/stand-to-sit  -DJ     [place] Level/Cues Needed (Transfer Goal 1, PT) modified independence  -DJ     Time Frame (Transfer Goal 1, PT) 10 days  -DJ       Row Name 03/19/25 1052          Gait Training Goal 1 (PT)    Activity/Assistive Device (Gait Training Goal 1, PT) gait (walking locomotion);improve balance and speed;increase endurance/gait distance;increase energy conservation  -DJ     [place] Level (Gait Training Goal 1, PT) standby assist  -DJ     Distance (Gait Training Goal 1, PT) 150'  -DJ     Time Frame (Gait Training Goal 1, PT) 10 days  -DJ       Row Name 03/19/25 1052          Stairs Goal 1 (PT)    Activity/Assistive Device (Stairs Goal 1, PT) ascending stairs;descending stairs  -DJ     [place] Level/Cues Needed (Stairs Goal 1, PT) contact guard required;verbal cues required  -DJ     Number of Stairs (Stairs Goal 1, PT) 4-5  -DJ       Row Name 03/19/25 1052          Therapy Assessment/Plan (PT)    Planned Therapy Interventions (PT) bed mobility training;gait training;balance training;home exercise program;strengthening;stair training;postural re-education;patient/family education;transfer training  -DJ               User Key  (r) = Recorded By, (t) = Taken By, (c) = Cosigned By      Initials Name Provider Type    Belle Corrigan, PT Physical Therapist                   Clinical Impression       Row Name 03/19/25 1041          Pain    Pre/Posttreatment Pain Comment No specific c/o pain but refused therapy initially because he just got \"comfortable\"  -DJ       Row Name 03/19/25 1041          Plan of Care Review    Plan of Care Reviewed With patient  -DJ     Outcome Evaluation 78yo obese white male admitted 3/16/25 due to epigastric pain x 3 days, found ot have gallstones; now s/p lap neelima 3/17/25. PMH Includes diabetes, " "hbp, L DDD, L LE weakness, gout, back surgery. PLOF: Pt lives at home with spouse with 4-5 ALICIA and optional inside stairs. He reports he amb without AD and was independent with ADLs and IADLs including cooking and driving. He is limited now by generalized weakness and decreased activity tolerance as well as expected post-op discomfort. Today, he was found resting in bed, he adamantly refused PT services again. Pt req sig encouragement from this PT and pt's nurse to partipate. He stated he was 80yo and had hbp and diabetes as well as O2 need for reqasons he could not participate in therapy. He eventually and reluctantly agreed to participate. He req min A to sit EOB with vc for sequencing. O2 sats decreased to 80% but increased to 90 with sitting EOB x 1-2 min. He impulsively stood from EOB (prior to gt belt being applied) and refused r wx since he doesnt use one at home. Pt took several small shuffled sidesteps toward HOB with 10L O2; unsteady but no overt LOB, impulsive. O2 88% after sidesteps but increased to 90% quckly after sitting EOB. He returned L sidelying with min A to raise legs back into bed. He wanted to let staff know that he was \"not comfortable\" yet but was \"getting there\" after returning to bed. He may benefit from skilled PT services to address functional deficits and prepare for d/c home. Pt's noncompliance and uncooperativeness will limit his rehab potential.  -DJ       Row Name 03/19/25 1041          Therapy Assessment/Plan (PT)    Patient/Family Therapy Goals Statement (PT) home  -DJ     Rehab Potential (PT) fair  -DJ     Criteria for Skilled Interventions Met (PT) yes;meets criteria;skilled treatment is necessary  -DJ     Therapy Frequency (PT) 5 times/wk  -DJ       Row Name 03/19/25 1041          Vital Signs    O2 Delivery Pre Treatment hi-flow  10L  -DJ     O2 Delivery Intra Treatment hi-flow  10L  -DJ     O2 Delivery Post Treatment hi-flow  10L  -DJ     Pre Patient Position Supine  -DJ     " Intra Patient Position Standing  -DJ     Post Patient Position Side Lying  -DJ       Row Name 03/19/25 1041          Positioning and Restraints    Pre-Treatment Position in bed  -DJ     Post Treatment Position bed  -DJ     In Bed notified nsg;side lying left;call light within reach;encouraged to call for assist;exit alarm on  -DJ               User Key  (r) = Recorded By, (t) = Taken By, (c) = Cosigned By      Initials Name Provider Type    Belle Corrigan, PT Physical Therapist                   Outcome Measures       Row Name 03/19/25 1053          How much help from another person do you currently need...    Turning from your back to your side while in flat bed without using bedrails? 3  -DJ     Moving from lying on back to sitting on the side of a flat bed without bedrails? 3  -DJ     Moving to and from a bed to a chair (including a wheelchair)? 2  -DJ     Standing up from a chair using your arms (e.g., wheelchair, bedside chair)? 3  -DJ     Climbing 3-5 steps with a railing? 2  -DJ     To walk in hospital room? 2  -DJ     AM-PAC 6 Clicks Score (PT) 15  -DJ     Highest Level of Mobility Goal 4 --> Transfer to chair/commode  -DJ       Row Name 03/19/25 1053          Functional Assessment    Outcome Measure Options AM-PAC 6 Clicks Basic Mobility (PT)  -DJ               User Key  (r) = Recorded By, (t) = Taken By, (c) = Cosigned By      Initials Name Provider Type    Belle Corrigan, PT Physical Therapist                                 Physical Therapy Education       Title: PT OT SLP Therapies (In Progress)       Topic: Physical Therapy (In Progress)       Point: Mobility training (Not Started)       Learner Progress:  Not documented in this visit.              Point: Home exercise program (Not Started)       Learner Progress:  Not documented in this visit.              Point: Body mechanics (In Progress)       Learning Progress Summary            Patient Nonacceptance, E, RT,NR by LYN at 3/19/2025 2383          "             Point: Precautions (In Progress)       Learning Progress Summary            Patient Nonacceptance, E, RT,NR by LYN at 3/19/2025 1054                                      User Key       Initials Effective Dates Name Provider Type Discipline    LYN 10/25/19 -  Belle Felix, PT Physical Therapist PT                  PT Recommendation and Plan  Planned Therapy Interventions (PT): bed mobility training, gait training, balance training, home exercise program, strengthening, stair training, postural re-education, patient/family education, transfer training  Outcome Evaluation: 78yo obese white male admitted 3/16/25 due to epigastric pain x 3 days, found ot have gallstones; now s/p lap neelima 3/17/25. PMH Includes diabetes, hbp, L DDD, L LE weakness, gout, back surgery. PLOF: Pt lives at home with spouse with 4-5 ALICIA and optional inside stairs. He reports he amb without AD and was independent with ADLs and IADLs including cooking and driving. He is limited now by generalized weakness and decreased activity tolerance as well as expected post-op discomfort. Today, he was found resting in bed, he adamantly refused PT services again. Pt req sig encouragement from this PT and pt's nurse to partipate. He stated he was 78yo and had hbp and diabetes as well as O2 need for reqasons he could not participate in therapy. He eventually and reluctantly agreed to participate. He req min A to sit EOB with vc for sequencing. O2 sats decreased to 80% but increased to 90 with sitting EOB x 1-2 min. He impulsively stood from EOB (prior to gt belt being applied) and refused r wx since he doesnt use one at home. Pt took several small shuffled sidesteps toward HOB with 10L O2; unsteady but no overt LOB, impulsive. O2 88% after sidesteps but increased to 90% quckly after sitting EOB. He returned L sidelying with min A to raise legs back into bed. He wanted to let staff know that he was \"not comfortable\" yet but was \"getting there\" after " returning to bed. He may benefit from skilled PT services to address functional deficits and prepare for d/c home. Pt's noncompliance and uncooperativeness will limit his rehab potential.     Time Calculation:   PT Evaluation Complexity  History, PT Evaluation Complexity: 3 or more personal factors and/or comorbidities  Examination of Body Systems (PT Eval Complexity): total of 4 or more elements  Clinical Presentation (PT Evaluation Complexity): evolving  Clinical Decision Making (PT Evaluation Complexity): moderate complexity  Overall Complexity (PT Evaluation Complexity): moderate complexity     PT Charges       Row Name 03/19/25 1057             Time Calculation    Start Time 1009  -DJ      Stop Time 1032  -DJ      Time Calculation (min) 23 min  -DJ      PT Non-Billable Time (min) 10 min  -DJ      PT Received On 03/19/25  -DJ      PT - Next Appointment 03/20/25  -DJ      PT Goal Re-Cert Due Date 03/29/25  -DJ                User Key  (r) = Recorded By, (t) = Taken By, (c) = Cosigned By      Initials Name Provider Type    DJ Belle Felix, PT Physical Therapist                  Therapy Charges for Today       Code Description Service Date Service Provider Modifiers Qty    98884181519 HC PT EVAL MOD COMPLEXITY 3 3/19/2025 Belle Felix, PT GP 1    33621608106 HC PT THERAPEUTIC ACT EA 15 MIN 3/19/2025 Belle Felix, PT GP 2            PT G-Codes  Outcome Measure Options: AM-PAC 6 Clicks Basic Mobility (PT)  AM-PAC 6 Clicks Score (PT): 15  PT Discharge Summary  Anticipated Discharge Disposition (PT): home with assist, home with home health    Belle Felix PT  3/19/2025

## 2025-03-19 NOTE — PROGRESS NOTES
LOS: 3 days   Patient Care Team:  Dereck Lemus MD as PCP - General  Dereck Lemus MD as PCP - Family Medicine    Chief Complaint:   Chief Complaint   Patient presents with    Abdominal Pain         Subjective    Today the patient is awake, alert, and oriented.  He is much less agitated and fidgety and says his abdomen hurts much less than it did yesterday.  He continues to require O2 by nasal cannula currently at 9 L and is on his CPAP as well.  Typically at home he does not use oxygen with his CPAP and he does not use oxygen during the day by not nasal cannula.  His chest x-ray yesterday afternoon suggested signs for CHF.  He was given IV Lasix but had a poor response with less than 400 cc out.  He has a history of cardiovascular disease but is not having any complaints of chest pain or other cardiac symptoms.  I have asked cardiology to see the patient and evaluate him for CHF.    Interval History:     Patient Complaints: Most significant for abdominal pain postoperatively for very inflamed gallbladder, and and low O2 sats when off of oxygen  Patient Denies: He denies chest pain or cardiac symptoms, no nausea or vomiting and no new complaint  History taken from: patient chart RN    Review of Systems:    All systems were reviewed and negative except for:  Constitution:  positive for fatigue, he is morbidly obese and on his CPAP with oxygen at 9 L to maintain his O2 saturations, and See HPI  Respiratory: positive for  shortness of air, his O2 sats dropped quickly when off of oxygen supplementation, and See HPI  Gastrointestinal: positive for  bloating / distention, pain, he still has a great deal of epigastric and right upper quadrant pain even though when questioned he says he feels much better and has much less pain.  It does not seem much different today than yesterday., and See HPI    Objective      Vital Signs  Temp:  [98 °F (36.7 °C)-99.2 °F (37.3 °C)] 98.9 °F (37.2 °C)  Pulse:  []  109  Resp:  [18] 18  BP: (111-154)/(55-97) 154/97    I/O'S  Intake & Output (last 7 days)         03/12 0701  03/13 0700 03/13 0701  03/14 0700 03/14 0701  03/15 0700 03/15 0701  03/16 0700 03/16 0701  03/17 0700 03/17 0701  03/18 0700 03/18 0701  03/19 0700 03/19 0701  03/20 0700    P.O.      490 560     I.V.     1000 1206.7      Total Intake     1000 1696.7 560     Urine      440 925     Drains      112 65     Total Output      552 990     Net     +1000 +1144.7 -430                 Urine Unmeasured Occurrence      1 x              Physical Exam:   General Appearance alert, appears stated age, interactive, fatigued, cooperative, pale, and and looks chronically ill  Lungs clear to auscultation, respirations regular, respirations even, respirations unlabored, and he does not have wheezes or rhonchi and no discernible crackles but is on his CPAP with 9 L of oxygen to maintain his sats  Heart regular rhythm & normal rate  Abdomen normal bowel sounds and and his bowel sounds are somewhat diminished, his abdomen is obese and distended and very tender in his epigastric and right upper quadrant to minimal palpation.  His drain has a small amount of s serous fluid  Extremities no edema, no cyanosis, and no redness     Results Review:     I reviewed the patient's new clinical results.  I reviewed the patient's other test results and agree with the interpretation                     Results from last 7 days   Lab Units 03/19/25  0637 03/18/25  1137 03/16/25  2058 03/14/25  0611   SODIUM mmol/L 136 136  136 138 139   POTASSIUM mmol/L 3.4* 3.6  3.6 3.6 3.8   CHLORIDE mmol/L 103 102  102 98 100   CO2 mmol/L 22.0 24.3  24.3 28.2 26.8   BUN mg/dL 30* 30*  30* 27* 25*   CREATININE mg/dL 1.25 1.36*  1.36* 1.25 0.98   GLUCOSE mg/dL 66 116*  116* 175* 230*   CALCIUM mg/dL 8.5* 8.7  8.7 9.8 10.4         Results from last 7 days   Lab Units 03/18/25  1148 03/16/25  2058 03/14/25  0611   WBC 10*3/mm3 12.01* 16.10* 9.22    HEMOGLOBIN g/dL 10.8* 12.0* 13.8   HEMATOCRIT % 34.3* 35.6* 42.5   PLATELETS 10*3/mm3 231 189 224         Results from last 7 days   Lab Units 03/18/25  1137   MAGNESIUM mg/dL 2.1     Results from last 7 days   Lab Units 03/18/25  1137   CHOLESTEROL mg/dL 90   TRIGLYCERIDES mg/dL 67   HDL CHOL mg/dL 39*   LDL CHOL mg/dL 36               Lab Results   Component Value Date    HGBA1C 7.10 (H) 03/18/2025     Glucose   Date/Time Value Ref Range Status   03/19/2025 0727 88 70 - 130 mg/dL Final   03/19/2025 0652 73 70 - 130 mg/dL Final   03/18/2025 2014 122 70 - 130 mg/dL Final   03/18/2025 1628 151 (H) 70 - 130 mg/dL Final   03/18/2025 1108 121 70 - 130 mg/dL Final   03/18/2025 0556 132 (H) 70 - 130 mg/dL Final   03/17/2025 2022 163 (H) 70 - 130 mg/dL Final   03/17/2025 1549 191 (H) 70 - 130 mg/dL Final       eGFR   Date Value Ref Range Status   03/19/2025 58.9 (L) >60.0 mL/min/1.73 Final   03/18/2025 53.3 (L) >60.0 mL/min/1.73 Final   03/18/2025 53.3 (L) >60.0 mL/min/1.73 Final   03/16/2025 58.9 (L) >60.0 mL/min/1.73 Final   03/14/2025 78.9 >60.0 mL/min/1.73 Final           Medication Review:   Scheduled Meds:acetaminophen, 1,000 mg, Oral, Q8H  allopurinol, 100 mg, Oral, Nightly  amLODIPine, 10 mg, Oral, Q24H  aspirin, 81 mg, Oral, Nightly  atorvastatin, 10 mg, Oral, Daily  bisacodyl, 10 mg, Rectal, Once  enoxaparin sodium, 40 mg, Subcutaneous, BID  furosemide, 80 mg, Oral, Daily  insulin glargine, 80 Units, Subcutaneous, Nightly  insulin lispro, 2-9 Units, Subcutaneous, 4x Daily AC & at Bedtime  ketoconazole, 1 Application, Topical, Daily  latanoprost, 1 drop, Both Eyes, Nightly  Lidocaine, 2 patch, Transdermal, Q24H  losartan, 100 mg, Oral, Daily  metFORMIN, 2,000 mg, Oral, Daily With Breakfast  metoprolol succinate XL, 100 mg, Oral, Daily  pioglitazone, 45 mg, Oral, Daily  polyethylene glycol, 17 g, Oral, Daily  senna-docusate sodium, 2 tablet, Oral, Nightly  sodium chloride, 10 mL, Intravenous, Q12H  tamsulosin,  0.8 mg, Oral, Nightly      Continuous Infusions:   PRN Meds:.  dextrose    dextrose    glucagon (human recombinant)    HYDROmorphone    naloxone    ondansetron    ondansetron ODT    oxyCODONE    [COMPLETED] Insert Peripheral IV **AND** sodium chloride    sodium chloride    sodium chloride                Epigastric pain    Type 2 diabetes mellitus with neurologic complication    Class 3 severe obesity due to excess calories with serious comorbidity and body mass index (BMI) of 40.0 to 44.9 in adult    Acute gangrenous cholecystitis    Essential hypertension    Benign prostatic hyperplasia with lower urinary tract symptoms    Obstructive sleep apnea treated with auto CPAP    Lumbar disc disease    High cholesterol    Cholelithiasis      #1 Acute Cholecystitis-apparently the laparoscopic procedure to remove his gallbladder was difficult and complicated.  Postoperatively his abdomen is distended and significantly tender in his right upper quadrant and epigastric area.  Apparently he was able to take a small amount of food last evening.  At this point he is also afebrile and his white blood cell count is declining.    2.  Shortness of breath/CHF-throughout the day the patient needed increasing degrees of oxygen to keep his O2 sats in the normal range.  A chest x-ray was ordered which showed cardiomegaly and increased lung markings and other changes that suggested CHF.  He was given IV Lasix but had a poor response.  Have asked cardiology to see the patient    3.  ASCVD-the patient has a history of ASCVD and previous stent placement but he is denying chest pain or other cardiac symptoms.    4.  Diabetes mellitus type 2-his blood sugars have been somewhat variable because his intake has been much more limited than what he gets at home.  For the most part he does not seem to follow a diabetic diet very closely.    5.  Sleep apnea-the patient uses CPAP at home and has required supplemental oxygen at night and he is wearing  his CPAP when awake to maintain his O2 sats.    6.  Hypertension-his blood pressure seems to be somewhat low this morning after being stable in general postoperatively.    7.  High cholesterol-his cholesterol is well-controlled with an LDL of 36    8.  Lumbar disc disease-is not complaining of back pain or discomfort at this point    9.  BPH-no complaints with difficulty urinating    10.  Hypokalemia I will replace his potassium on protocol        Plan for disposition:Where: home    Dereck Lemus MD  03/19/25  07:48 EDT          Time:

## 2025-03-20 ENCOUNTER — TRANSCRIBE ORDERS (OUTPATIENT)
Dept: HOME HEALTH SERVICES | Facility: HOME HEALTHCARE | Age: 80
End: 2025-03-20
Payer: MEDICARE

## 2025-03-20 ENCOUNTER — DOCUMENTATION (OUTPATIENT)
Dept: HOME HEALTH SERVICES | Facility: HOME HEALTHCARE | Age: 80
End: 2025-03-20
Payer: MEDICARE

## 2025-03-20 ENCOUNTER — TELEPHONE (OUTPATIENT)
Dept: CARDIOLOGY | Age: 80
End: 2025-03-20

## 2025-03-20 ENCOUNTER — READMISSION MANAGEMENT (OUTPATIENT)
Dept: CALL CENTER | Facility: HOSPITAL | Age: 80
End: 2025-03-20
Payer: COMMERCIAL

## 2025-03-20 VITALS
OXYGEN SATURATION: 93 % | HEIGHT: 68 IN | WEIGHT: 280 LBS | TEMPERATURE: 98.4 F | HEART RATE: 90 BPM | DIASTOLIC BLOOD PRESSURE: 66 MMHG | BODY MASS INDEX: 42.44 KG/M2 | SYSTOLIC BLOOD PRESSURE: 146 MMHG | RESPIRATION RATE: 18 BRPM

## 2025-03-20 DIAGNOSIS — K81.0 ACUTE CHOLECYSTITIS: ICD-10-CM

## 2025-03-20 DIAGNOSIS — I50.9 CONGESTIVE HEART FAILURE, UNSPECIFIED HF CHRONICITY, UNSPECIFIED HEART FAILURE TYPE: Primary | ICD-10-CM

## 2025-03-20 PROBLEM — E87.79 CARDIAC VOLUME OVERLOAD: Status: ACTIVE | Noted: 2025-03-20

## 2025-03-20 LAB
ANION GAP SERPL CALCULATED.3IONS-SCNC: 11 MMOL/L (ref 5–15)
BASOPHILS # BLD AUTO: 0.02 10*3/MM3 (ref 0–0.2)
BASOPHILS NFR BLD AUTO: 0.2 % (ref 0–1.5)
BUN SERPL-MCNC: 23 MG/DL (ref 8–23)
BUN/CREAT SERPL: 23.2 (ref 7–25)
CALCIUM SPEC-SCNC: 8.9 MG/DL (ref 8.6–10.5)
CHLORIDE SERPL-SCNC: 103 MMOL/L (ref 98–107)
CO2 SERPL-SCNC: 24 MMOL/L (ref 22–29)
CREAT SERPL-MCNC: 0.99 MG/DL (ref 0.76–1.27)
DEPRECATED RDW RBC AUTO: 42.1 FL (ref 37–54)
EGFRCR SERPLBLD CKD-EPI 2021: 78 ML/MIN/1.73
EOSINOPHIL # BLD AUTO: 0.25 10*3/MM3 (ref 0–0.4)
EOSINOPHIL NFR BLD AUTO: 3 % (ref 0.3–6.2)
ERYTHROCYTE [DISTWIDTH] IN BLOOD BY AUTOMATED COUNT: 13.2 % (ref 12.3–15.4)
GLUCOSE BLDC GLUCOMTR-MCNC: 131 MG/DL (ref 70–130)
GLUCOSE BLDC GLUCOMTR-MCNC: 154 MG/DL (ref 70–130)
GLUCOSE SERPL-MCNC: 166 MG/DL (ref 65–99)
HCT VFR BLD AUTO: 34.4 % (ref 37.5–51)
HGB BLD-MCNC: 11.4 G/DL (ref 13–17.7)
IMM GRANULOCYTES # BLD AUTO: 0.17 10*3/MM3 (ref 0–0.05)
IMM GRANULOCYTES NFR BLD AUTO: 2 % (ref 0–0.5)
LYMPHOCYTES # BLD AUTO: 1.05 10*3/MM3 (ref 0.7–3.1)
LYMPHOCYTES NFR BLD AUTO: 12.6 % (ref 19.6–45.3)
MCH RBC QN AUTO: 29.2 PG (ref 26.6–33)
MCHC RBC AUTO-ENTMCNC: 33.1 G/DL (ref 31.5–35.7)
MCV RBC AUTO: 88 FL (ref 79–97)
MONOCYTES # BLD AUTO: 0.78 10*3/MM3 (ref 0.1–0.9)
MONOCYTES NFR BLD AUTO: 9.4 % (ref 5–12)
NEUTROPHILS NFR BLD AUTO: 6.06 10*3/MM3 (ref 1.7–7)
NEUTROPHILS NFR BLD AUTO: 72.8 % (ref 42.7–76)
NRBC BLD AUTO-RTO: 0 /100 WBC (ref 0–0.2)
PLATELET # BLD AUTO: 298 10*3/MM3 (ref 140–450)
PMV BLD AUTO: 9.7 FL (ref 6–12)
POTASSIUM SERPL-SCNC: 3.4 MMOL/L (ref 3.5–5.2)
RBC # BLD AUTO: 3.91 10*6/MM3 (ref 4.14–5.8)
SODIUM SERPL-SCNC: 138 MMOL/L (ref 136–145)
TROPONIN T SERPL HS-MCNC: 17 NG/L
WBC NRBC COR # BLD AUTO: 8.33 10*3/MM3 (ref 3.4–10.8)

## 2025-03-20 PROCEDURE — 63710000001 INSULIN LISPRO (HUMAN) PER 5 UNITS: Performed by: STUDENT IN AN ORGANIZED HEALTH CARE EDUCATION/TRAINING PROGRAM

## 2025-03-20 PROCEDURE — 99232 SBSQ HOSP IP/OBS MODERATE 35: CPT | Performed by: NURSE PRACTITIONER

## 2025-03-20 PROCEDURE — 84484 ASSAY OF TROPONIN QUANT: CPT | Performed by: INTERNAL MEDICINE

## 2025-03-20 PROCEDURE — 82948 REAGENT STRIP/BLOOD GLUCOSE: CPT

## 2025-03-20 PROCEDURE — 80048 BASIC METABOLIC PNL TOTAL CA: CPT | Performed by: STUDENT IN AN ORGANIZED HEALTH CARE EDUCATION/TRAINING PROGRAM

## 2025-03-20 PROCEDURE — 85025 COMPLETE CBC W/AUTO DIFF WBC: CPT | Performed by: INTERNAL MEDICINE

## 2025-03-20 PROCEDURE — 25010000002 ENOXAPARIN PER 10 MG: Performed by: STUDENT IN AN ORGANIZED HEALTH CARE EDUCATION/TRAINING PROGRAM

## 2025-03-20 RX ORDER — POTASSIUM CHLORIDE 1500 MG/1
20 TABLET, EXTENDED RELEASE ORAL DAILY
Status: DISCONTINUED | OUTPATIENT
Start: 2025-03-20 | End: 2025-03-20 | Stop reason: HOSPADM

## 2025-03-20 RX ORDER — POTASSIUM CHLORIDE 750 MG/1
10 CAPSULE, EXTENDED RELEASE ORAL DAILY
Qty: 90 CAPSULE | Refills: 3 | Status: SHIPPED | OUTPATIENT
Start: 2025-03-20

## 2025-03-20 RX ORDER — POTASSIUM CHLORIDE 1500 MG/1
40 TABLET, EXTENDED RELEASE ORAL EVERY 4 HOURS
Status: DISCONTINUED | OUTPATIENT
Start: 2025-03-20 | End: 2025-03-20 | Stop reason: HOSPADM

## 2025-03-20 RX ORDER — POTASSIUM CHLORIDE 750 MG/1
10 CAPSULE, EXTENDED RELEASE ORAL DAILY
Qty: 90 CAPSULE | Refills: 3 | Status: SHIPPED | OUTPATIENT
Start: 2025-03-20 | End: 2025-03-20

## 2025-03-20 RX ADMIN — INSULIN LISPRO 2 UNITS: 100 INJECTION, SOLUTION INTRAVENOUS; SUBCUTANEOUS at 12:33

## 2025-03-20 RX ADMIN — ENOXAPARIN SODIUM 40 MG: 100 INJECTION SUBCUTANEOUS at 09:33

## 2025-03-20 RX ADMIN — PIOGLITAZONE HYDROCHLORIDE 45 MG: 30 TABLET ORAL at 09:33

## 2025-03-20 RX ADMIN — AMLODIPINE BESYLATE 10 MG: 10 TABLET ORAL at 09:33

## 2025-03-20 RX ADMIN — ACETAMINOPHEN 1000 MG: 500 TABLET, FILM COATED ORAL at 09:34

## 2025-03-20 RX ADMIN — LATANOPROST 1 DROP: 50 SOLUTION/ DROPS OPHTHALMIC at 00:21

## 2025-03-20 RX ADMIN — POTASSIUM CHLORIDE 20 MEQ: 1500 TABLET, EXTENDED RELEASE ORAL at 12:33

## 2025-03-20 RX ADMIN — KETOCONAZOLE 1 APPLICATION: 20 CREAM TOPICAL at 09:34

## 2025-03-20 RX ADMIN — ACETAMINOPHEN 1000 MG: 500 TABLET, FILM COATED ORAL at 00:20

## 2025-03-20 RX ADMIN — POTASSIUM CHLORIDE 40 MEQ: 1500 TABLET, EXTENDED RELEASE ORAL at 09:33

## 2025-03-20 RX ADMIN — LOSARTAN POTASSIUM 100 MG: 100 TABLET, FILM COATED ORAL at 09:35

## 2025-03-20 RX ADMIN — METFORMIN HYDROCHLORIDE 2000 MG: 1000 TABLET, FILM COATED ORAL at 09:34

## 2025-03-20 RX ADMIN — ATORVASTATIN CALCIUM 10 MG: 10 TABLET ORAL at 09:34

## 2025-03-20 RX ADMIN — FUROSEMIDE 80 MG: 80 TABLET ORAL at 09:34

## 2025-03-20 RX ADMIN — METOPROLOL SUCCINATE 100 MG: 100 TABLET, EXTENDED RELEASE ORAL at 09:33

## 2025-03-20 NOTE — PROGRESS NOTES
LOS: 3 days   Patient Care Team:  Dereck Lemus MD as PCP - General  Dereck Lemus MD as PCP - Family Medicine    Chief Complaint:   Chief Complaint   Patient presents with    Abdominal Pain         Subjective    Today the patient is awake, alert, and seems oriented although he seems to have an extremely limited insight into the severity of his respiratory issues.  At rest he is on 5 L of oxygen to maintain his O2 saturations.  He needed 8 L in his CPAP to keep his O2 sats up last night, and his O2 sats dropped to the low 80s with physical therapy and required 10 L to bring it back up.  Despite explaining to him that he essentially has respiratory failure he is insisting on being discharged home today which seems unreasonable to me.  His abdominal pain is better and he is eating some now as well.  He is becoming progressively more uncooperative and agitated with the staff.  He was refusing having his blood drawn this morning until I came into the room and yesterday he was very resistant to doing physical therapy.  His performance with PT was poor.    Interval History:     Patient Complaints: He continues to have abdominal pain although less.  He continues to require oxygen although that does seem to be improving slowly.  Patient Denies: He denies chest pain or cardiac symptoms, he denies nausea and vomiting  History taken from: patient chart RN    Review of Systems:    All systems were reviewed and negative except for:  Constitution:  positive for fatigue and See HPI  Respiratory: positive for  shortness of air, he easily becomes dyspneic and dropped his O2 sats with activity., and See HPI  Gastrointestinal: positive for  bloating / distention, pain, he still has right upper quadrant and epigastric tenderness and pain but it is much less than it has been, and See HPI  Neurological: positive for  he has no focal neurologic signs but his mood and attitude is inconsistent with his physical status.  He is  becoming a little more agitated and uncooperative with the staff.  He seems to lack insight into his illness.  and See HPI    Objective      Vital Signs  Temp:  [98 °F (36.7 °C)-99.2 °F (37.3 °C)] 98.9 °F (37.2 °C)  Pulse:  [] 109  Resp:  [18] 18  BP: (111-154)/(55-97) 154/97    I/O'S  Intake & Output (last 7 days)         03/13 0701  03/14 0700 03/14 0701  03/15 0700 03/15 0701  03/16 0700 03/16 0701  03/17 0700 03/17 0701  03/18 0700 03/18 0701  03/19 0700 03/19 0701  03/20 0700 03/20 0701  03/21 0700    P.O.     490 560 240     I.V. (mL/kg)    1000 1206.7       Total Intake(mL/kg)    1000 1696.7 560 240 (1.9)     Urine (mL/kg/hr)      (0.4)     Drains     112 65      Stool       0     Total Output          Net    +1000 +1144.7 -430 -980                 Urine Unmeasured Occurrence     1 x  1 x     Stool Unmeasured Occurrence       1 x             Physical Exam:   General Appearance alert, appears stated age, fatigued, cooperative, uncooperative, pale, morbidly obese, and he has insight into the nature of his respiratory issues is extremely limited  Lungs respirations regular, respirations even, respirations unlabored, and he is on oxygen by nasal cannula at 5 L  Heart regular rhythm & normal rate and his heart sounds are very distant due to his obesity but the monitor indicates that he is somewhat tachycardic today with a heart rate around 100  Abdomen normal bowel sounds, no guarding, no rebound tenderness, and he still has pain although less in his right upper quadrant.  His exam still is limited based on his obesity but he clearly has less pain than yesterday.  Extremities no edema, no cyanosis, and no redness     Results Review:     I reviewed the patient's new clinical results.  I reviewed the patient's new imaging results and agree with the interpretation.  I reviewed the patient's other test results and agree with the interpretation                     Results from last 7 days    Lab Units 03/19/25  1700 03/19/25  0637 03/18/25  1137 03/16/25 2058 03/14/25  0611   SODIUM mmol/L  --  136 136  136 138 139   POTASSIUM mmol/L 3.9 3.4* 3.6  3.6 3.6 3.8   CHLORIDE mmol/L  --  103 102  102 98 100   CO2 mmol/L  --  22.0 24.3  24.3 28.2 26.8   BUN mg/dL  --  30* 30*  30* 27* 25*   CREATININE mg/dL  --  1.25 1.36*  1.36* 1.25 0.98   GLUCOSE mg/dL  --  66 116*  116* 175* 230*   CALCIUM mg/dL  --  8.5* 8.7  8.7 9.8 10.4         Results from last 7 days   Lab Units 03/18/25  1148 03/16/25 2058 03/14/25  0611   WBC 10*3/mm3 12.01* 16.10* 9.22   HEMOGLOBIN g/dL 10.8* 12.0* 13.8   HEMATOCRIT % 34.3* 35.6* 42.5   PLATELETS 10*3/mm3 231 189 224         Results from last 7 days   Lab Units 03/18/25  1137   MAGNESIUM mg/dL 2.1     Results from last 7 days   Lab Units 03/18/25  1137   CHOLESTEROL mg/dL 90   TRIGLYCERIDES mg/dL 67   HDL CHOL mg/dL 39*   LDL CHOL mg/dL 36     Results from last 7 days   Lab Units 03/19/25  0637   PROBNP pg/mL 3,133.0*           Lab Results   Component Value Date    HGBA1C 7.10 (H) 03/18/2025     Glucose   Date/Time Value Ref Range Status   03/20/2025 0550 131 (H) 70 - 130 mg/dL Final   03/19/2025 2113 179 (H) 70 - 130 mg/dL Final   03/19/2025 1606 161 (H) 70 - 130 mg/dL Final   03/19/2025 1126 145 (H) 70 - 130 mg/dL Final   03/19/2025 0727 88 70 - 130 mg/dL Final   03/19/2025 0652 73 70 - 130 mg/dL Final   03/18/2025 2014 122 70 - 130 mg/dL Final   03/18/2025 1628 151 (H) 70 - 130 mg/dL Final       eGFR   Date Value Ref Range Status   03/19/2025 58.9 (L) >60.0 mL/min/1.73 Final   03/18/2025 53.3 (L) >60.0 mL/min/1.73 Final   03/18/2025 53.3 (L) >60.0 mL/min/1.73 Final   03/16/2025 58.9 (L) >60.0 mL/min/1.73 Final   03/14/2025 78.9 >60.0 mL/min/1.73 Final           Medication Review:   Scheduled Meds:acetaminophen, 1,000 mg, Oral, Q8H  allopurinol, 100 mg, Oral, Nightly  amLODIPine, 10 mg, Oral, Q24H  aspirin, 81 mg, Oral, Nightly  atorvastatin, 10 mg, Oral,  Daily  bisacodyl, 10 mg, Rectal, Once  enoxaparin sodium, 40 mg, Subcutaneous, BID  furosemide, 80 mg, Oral, Daily  insulin glargine, 80 Units, Subcutaneous, Nightly  insulin lispro, 2-9 Units, Subcutaneous, 4x Daily AC & at Bedtime  ketoconazole, 1 Application, Topical, Daily  latanoprost, 1 drop, Both Eyes, Nightly  Lidocaine, 2 patch, Transdermal, Q24H  losartan, 100 mg, Oral, Daily  metFORMIN, 2,000 mg, Oral, Daily With Breakfast  metoprolol succinate XL, 100 mg, Oral, Daily  pioglitazone, 45 mg, Oral, Daily  polyethylene glycol, 17 g, Oral, Daily  sodium chloride, 10 mL, Intravenous, Q12H  tamsulosin, 0.8 mg, Oral, Nightly      Continuous Infusions:   PRN Meds:.  dextrose    dextrose    glucagon (human recombinant)    HYDROmorphone    naloxone    ondansetron    ondansetron ODT    oxyCODONE    Potassium Replacement - Follow Nurse / BPA Driven Protocol    [COMPLETED] Insert Peripheral IV **AND** sodium chloride    sodium chloride    sodium chloride                Epigastric pain    Type 2 diabetes mellitus with neurologic complication    Class 3 severe obesity due to excess calories with serious comorbidity and body mass index (BMI) of 40.0 to 44.9 in adult    Acute gangrenous cholecystitis    Essential hypertension    Benign prostatic hyperplasia with lower urinary tract symptoms    Obstructive sleep apnea treated with auto CPAP    Lumbar disc disease    High cholesterol    Cholelithiasis    #1 Acute Cholecystitis-he underwent successful although very difficult laparoscopic cholecystectomy and had significant pain afterwards.  His pain continues to improve and he is now eating some.  Surgery feels like he is making slow progress but does not see any evidence for complications.  Fortunately has no fever but his last white blood cell count was elevated slightly    2.  CHF-the patient's O2 requirements clearly were increasing and chest x-ray suggested CHF.  He has diuresed about 1500 cc with IV Lasix in the last 24  hours.  His cardiac situation seems to be stabilizing some.  He had a 2D echocardiogram which revealed an EF of 66%, mild LVH, calcification at his aortic valve with only mild aortic valve stenosis.    3.  Acute respiratory failure-related to the CHF and he still has high flow oxygen needs with 5 L at rest and 10 L after minimal activity with physical therapy.  He needed 8 L with his CPAP last night.  He is insisting on being discharged home today.  I explained to him that that seem wildly impractical.  He said he would take it up with the cardiologist to see if he thought he was ready to be discharged home.  (If cardiology agrees to discharge I will make the arrangements.)    4.  ASCVD-no complaints of chest pain and no evidence for active cardiac disease    5.  Diabetes mellitus type 2-his blood sugars have been under reasonably good control and his hemoglobin A1c was acceptable at 7.1    6.  Sleep apnea-the patient has sleep apnea and has been using his CPAP in the hospital.  Initially as he was going into his heart failure and respiratory failure he was requiring CPAP during the day to maintain his saturations.    7.  Hypertension-his blood pressure is well-controlled on his current medications.    8.  Lumbar disc disease-he has no complaints of back pain or discomfort at this time    9.  BPH no complaints in reference still urinating or complaints of retention    10.  Hypokalemia-potassium has been adequately replaced.      At this point he does seem to be improving but his oxygen needs are still significant since he does not use oxygen on a regular basis at home.  I will wait to see if cardiology feels he is stable enough to be discharged today.        Plan for disposition:Where: home and When:  When stable.    Dereck Lemus MD  03/20/25  08:23 EDT          Time:

## 2025-03-20 NOTE — PROGRESS NOTES
Start PACC Note    Home Health Referral    Evaluated patient and wife on Home Care and services available. Patient offered choice of available HHC and agreeable to SN,PT,OT services with [facility].    Care Types:   Isolation Precautions: [unfilled]    Social Determinants of Health:  Tobacco Use: Low Risk  (3/20/2025)    Patient History     Smoking Tobacco Use: Never     Smokeless Tobacco Use: Never     Passive Exposure: Not on file     Social History     Substance and Sexual Activity   Alcohol Use No     Social History     Substance and Sexual Activity   Drug Use No       Does the patient have any financial resource strain?   Does the patient have any food insecurities?   Does the patient have any housing instabilities?     If any of the above is noted as yes - consider a MSW evaluation once the patient returns home.    START PATIENT REGISTRATION INFORMATION  Order Information  Order Signing Physician: No att. providers found  Service Ordered RN?: Yes  Service Ordered PT?: Yes  Service Ordered OT?: Yes  Service Ordered ST?: No  Service Ordered MSW?: No  Service Ordered HHA?: No  Following Physician: Dereck Lemus MD  Following Physician Phone: [phone]  Overseeing Physician: Dereck Lemus MD  (Required for Residents Only)  Agreeable to Follow? Yes  Date/Time of Call 03/20/25 14:35 EDT, Spoke with: per epic order from PCP Dr Lemus    Care Coordination  Same Day SOC?: No  Primary Care Physician: Dereck Lemus MD  Primary Care Physician Phone: [phone]  Primary Care Physician Address: [address] [place] [address]  Visit Instructions: N/A  Service Discharge Location Type: Home  Service Facility Name: N/A  Service Floor Facility: N/A  Service Room No: N/A    Demographics  Patient Last Name: Jose  Patient First Name: Will  Language/Communication Barrier: none  Service Address: [address]  Service City: [place]  Service State: [place]  Service Zip: [address]  Service Home  Phone: [phone]  Other Phone Numbers:   Telephone Information:   Mobile [phone]     Emergency Contact:   Extended Emergency Contact Information  Primary Emergency Contact: HardeepMelanie montes JING  Address: [address]  Home Phone: [phone]  Mobile Phone: [phone]  Relation: Spouse  Secondary Emergency Contact: ORIKARAL  Mobile Phone: [phone]  Relation: Son    Admission Information  Admit Date: 03/16/2025  Patient Status at Discharge:   Admitting Diagnosis: CHF, acute cholecystitis, S/P lap neelima. hypoxia    Caregiver Information  Caregiver First Name:   Caregiver Last Name:   Caregiver Relationship to Patient:   Caregiver Phone Number:   Caregiver Notes:     [employer]  No      END PATIENT REGISTRATION INFORMATION    Start PACC Summary    Additional Comments: Call wife's cell Melanie szymanski please [phone]    END PACC Summary    Discharge Date: Pending    Referral Source: [facility]    Signed By: Samantha Elias RN, 3/20/2025, 14:35 EDT     Date/Time: 03/20/25 14:35 EDT    End PACC Note

## 2025-03-20 NOTE — PROGRESS NOTES
General Surgery Note    Summary:  79-year-old gentleman postoperative day #2 status post laparoscopic cholecystectomy with IOC for necrotizing cholecystitis    Interval Status:  Pain is improving.  Tolerating regular diet without any nausea or vomiting.  Had a bowel movement.  His drain is serous.  He is up in the chair and took some steps with physical therapy.     Physical Exam:    Not sick, no jaundice or scleral icterus  Afebrile, heart rate 84, /95  Nonlabored breathing on CPAP  Abdomen soft, appropriately mildly tender, protuberant but no more distended than yesterday, incisions clean and dry, drain serous 65 cc      Labs:  Results from last 7 days   Lab Units 03/18/25  1148 03/16/25 2058 03/14/25  0611   WBC 10*3/mm3 12.01* 16.10* 9.22   HEMOGLOBIN g/dL 10.8* 12.0* 13.8   PLATELETS 10*3/mm3 231 189 224     Results from last 7 days   Lab Units 03/19/25  1700 03/19/25  0637 03/18/25  1137 03/16/25 2058 03/14/25  0611   SODIUM mmol/L  --  136 136  136 138 139   POTASSIUM mmol/L 3.9 3.4* 3.6  3.6 3.6 3.8   CHLORIDE mmol/L  --  103 102  102 98 100   CO2 mmol/L  --  22.0 24.3  24.3 28.2 26.8   BUN mg/dL  --  30* 30*  30* 27* 25*   CREATININE mg/dL  --  1.25 1.36*  1.36* 1.25 0.98   CALCIUM mg/dL  --  8.5* 8.7  8.7 9.8 10.4   BILIRUBIN mg/dL  --   --  0.5 0.6 0.4   ALK PHOS U/L  --   --  105 90 91   ALT (SGPT) U/L  --   --  19 8 10   AST (SGOT) U/L  --   --  23 14 14   GLUCOSE mg/dL  --  66 116*  116* 175* 230*       Plan:  -Continue diet as tolerated  -Remove drain  -Encourage ambulation and working with physical therapy  -May be ready to go from a surgical standpoint in the next couple of days depending on his respiratory status and cardiology recommendations        Glen Pimentel MD  [facility]  [address], [address]  [place], [place], [address]  P: [phone]  F: [phone]

## 2025-03-20 NOTE — PROGRESS NOTES
"[facility]    Patient Name: Will Garcia  :1945  79 y.o.  LOS: 2  Encounter Provider: TOBIAS Gagnon      Patient Care Team:  Dereck Lemus MD as PCP - General (Internal Medicine)  Zay Anderson MD as Consulting Physician (Cardiology)    Chief Complaint: Follow-up volume overload    Interval History: Dyspnea is resolved.  Patient is anxious for discharge       Objective   Vital Signs  Temp:  [98.1 °F (36.7 °C)-98.4 °F (36.9 °C)] 98.4 °F (36.9 °C)  Heart Rate:  [79-90] 90  Resp:  [18-20] 18  BP: (113-146)/(59-95) 146/66    Intake/Output Summary (Last 24 hours) at 3/20/2025 0851  Last data filed at 3/20/2025 0010  Gross per 24 hour   Intake 240 ml   Output 820 ml   Net -580 ml     Flowsheet Rows      Flowsheet Row First Filed Value   Admission Height 172.7 cm (68\") Documented at 2025 0730   Admission Weight 127 kg (280 lb) Documented at 2025 0730              Constitutional:       Appearance: Normal appearance. Well-developed.   Eyes:      Conjunctiva/sclera: Conjunctivae normal.   Neck:      Vascular: No carotid bruit.   Pulmonary:      Effort: Pulmonary effort is normal.      Breath sounds: Normal breath sounds.   Cardiovascular:      Normal rate. Regular rhythm. Normal S1. Normal S2.       Murmurs: There is no murmur.      No gallop.  No click. No rub.   Edema:     Peripheral edema absent.   Musculoskeletal: Normal range of motion. Skin:     General: Skin is warm and dry.   Neurological:      Mental Status: Alert and oriented to person, place, and time.      GCS: GCS eye subscore is 4. GCS verbal subscore is 5. GCS motor subscore is 6.   Psychiatric:         Speech: Speech normal.         Behavior: Behavior normal.         Thought Content: Thought content normal.         Judgment: Judgment normal.           Pertinent Test Results:  Results from last 7 days   Lab Units 25  1700 25  0637 25  1137 25  2058 25  0611   SODIUM " mmol/L  --  136 136  136 138 139   POTASSIUM mmol/L 3.9 3.4* 3.6  3.6 3.6 3.8   CHLORIDE mmol/L  --  103 102  102 98 100   CO2 mmol/L  --  22.0 24.3  24.3 28.2 26.8   BUN mg/dL  --  30* 30*  30* 27* 25*   CREATININE mg/dL  --  1.25 1.36*  1.36* 1.25 0.98   GLUCOSE mg/dL  --  66 116*  116* 175* 230*   CALCIUM mg/dL  --  8.5* 8.7  8.7 9.8 10.4   AST (SGOT) U/L  --   --  23 14 14   ALT (SGPT) U/L  --   --  19 8 10     Results from last 7 days   Lab Units 03/16/25  2229 03/16/25 2058 03/14/25  0728 03/14/25  0611   HSTROP T ng/L 19 16 13 14     Results from last 7 days   Lab Units 03/18/25  1148 03/16/25 2058 03/14/25  0611   WBC 10*3/mm3 12.01* 16.10* 9.22   HEMOGLOBIN g/dL 10.8* 12.0* 13.8   HEMATOCRIT % 34.3* 35.6* 42.5   PLATELETS 10*3/mm3 231 189 224         Results from last 7 days   Lab Units 03/18/25  1137   MAGNESIUM mg/dL 2.1     Results from last 7 days   Lab Units 03/18/25  1137   CHOLESTEROL mg/dL 90   TRIGLYCERIDES mg/dL 67   HDL CHOL mg/dL 39*     Results from last 7 days   Lab Units 03/19/25  0637   PROBNP pg/mL 3,133.0*               Medication Review:   acetaminophen, 1,000 mg, Oral, Q8H  allopurinol, 100 mg, Oral, Nightly  amLODIPine, 10 mg, Oral, Q24H  aspirin, 81 mg, Oral, Nightly  atorvastatin, 10 mg, Oral, Daily  bisacodyl, 10 mg, Rectal, Once  enoxaparin sodium, 40 mg, Subcutaneous, BID  furosemide, 80 mg, Oral, Daily  insulin glargine, 80 Units, Subcutaneous, Nightly  insulin lispro, 2-9 Units, Subcutaneous, 4x Daily AC & at Bedtime  ketoconazole, 1 Application, Topical, Daily  latanoprost, 1 drop, Both Eyes, Nightly  Lidocaine, 2 patch, Transdermal, Q24H  losartan, 100 mg, Oral, Daily  metFORMIN, 2,000 mg, Oral, Daily With Breakfast  metoprolol succinate XL, 100 mg, Oral, Daily  pioglitazone, 45 mg, Oral, Daily  polyethylene glycol, 17 g, Oral, Daily  tamsulosin, 0.8 mg, Oral, Nightly              Assessment & Plan     Active Hospital Problems    Diagnosis  POA    **Epigastric pain  [R10.13]  Yes    Cholelithiasis [K80.20]  Yes    Acute gangrenous cholecystitis [K81.0]  Yes    Lumbar disc disease [M51.9]  Unknown    High cholesterol [E78.00]  Unknown    Benign prostatic hyperplasia with lower urinary tract symptoms [N40.1]  Yes    Class 3 severe obesity due to excess calories with serious comorbidity and body mass index (BMI) of 40.0 to 44.9 in adult [E66.813, E66.01, Z68.41]  Not Applicable    Essential hypertension [I10]  Yes    Obstructive sleep apnea treated with auto CPAP [G47.33]  Yes    Type 2 diabetes mellitus with neurologic complication [E11.49]  Yes      Resolved Hospital Problems   No resolved problems to display.        Volume overload  Patient had some dyspnea and volume overload status post laparoscopic cholecystectomy  Echocardiogram this admission revealed LVEF 66% with mild LVH and calcification of the aortic valve with only mild aortic valve stenosis  Dyspnea has improved and is now resolved with IV diuretics  Acute cholecystitis  Status post laparoscopic cholecystectomy  Diabetes mellitus  HELEN requiring CPAP  HTN  Lumbar disc disease      Okay for discharge from cardiac standpoint.  Patient will follow-up with me in approximately 1 month in clinic for reassessment.  He does have home furosemide to continue.  He will call for any worsening dyspnea.           TOBIAS Gagnon  [facility]   [facility]  [address] [place] [address] - [address] [place] [id number]  Office: [phone]    03/20/25  08:51 EDT

## 2025-03-20 NOTE — PLAN OF CARE
Goal Outcome Evaluation:  Plan of Care Reviewed With: patient        Progress: no change   Patient sat in chair for long interval x 2, tolerated well. O2 remains 8L per home C-pap. Oxygen levels drop into 80's with activity or upon removal of Cpap. Voids per urinal. PORSHA drain removed as ordered. Medicated once for pain with good relief. VSS. Bed alarm on, safety maintained.

## 2025-03-20 NOTE — DISCHARGE PLACEMENT REQUEST
"Will Rehman (79 y.o. Male)       Date of Birth   1945    Social Security Number       Address   [address] [place] [address]    Home Phone   [phone]    MRN   [id number]       Christianity   Non-[employer]    Marital Status                               Admission Date   3/16/2025    Admission Type   Emergency    Admitting Provider   Dereck Lemus MD    Attending Provider   Dereck Lemus MD    Department, Room/Bed   [address], [address]       Discharge Date       Discharge Disposition   Home or Self Care    Discharge Destination                                 Attending Provider: Dereck Lemus MD    Allergies: Ceclor [Cefaclor]    Isolation: None   Infection: None   Code Status: CPR    Ht: 172.7 cm (68\")   Wt: 127 kg (280 lb)    Admission Cmt: None   Principal Problem: Epigastric pain [R10.13]                   Active Insurance as of 3/16/2025       Primary Coverage       Payor Plan Insurance Group Employer/Plan Group    MEDICARE MEDICARE A ONLY        Payor Plan Address Payor Plan Phone Number Payor Plan Fax Number Effective Dates    [address] [phone]  9/1/2011 - None Entered    [facility] [id number]         Subscriber Name Subscriber Birth Date Member ID       WILL REHMAN 1945 [id number]               Secondary Coverage       Payor Plan Insurance Group Employer/Plan Group    [id number]       Payor Plan Address Payor Plan Phone Number Payor Plan Fax Number Effective Dates    [address]   1/1/2016 - None Entered    [facility] [id number]         Subscriber Name Subscriber Birth Date Member ID       WILL REHMAN 1945 [id number]                     Emergency Contacts        (Rel.) Home Phone Work Phone Mobile Phone    Melanie Rehman (Spouse) [phone] -- [phone]    RUFINOAKRLA CH (Son) -- -- [phone]                "

## 2025-03-20 NOTE — CASE MANAGEMENT/SOCIAL WORK
Continued Stay Note  [facility]     Patient Name: Will Garcia  MRN: [id number]  Today's Date: 3/20/2025    Admit Date: 3/16/2025    Plan: Home with [facility]   Discharge Plan       Row Name 03/20/25 1420       Plan    Plan Home with [facility]    Patient/Family in Agreement with Plan yes    Plan Comments Noted plans for dc today, pt will need HH and oxygen at home. Pt wife has oxygen through [facility], she is not using it and would like to use equipment she is still renting from them. Referral sent and accepted by [facility], spoke with Melva and they will work out details. Pt has portable tank to get home, referral sent to [facility] pending, spoke with Samantha/[facility], she has spoken to pt wife and can accept. No further dc needs, family transport. -Danielle COFFEY    Final Discharge Disposition Code 06 - home with home health care    Final Note home with [facility] and oxygen from [facility]                   Discharge Codes    No documentation.                 Expected Discharge Date and Time       Expected Discharge Date Expected Discharge Time    Mar 20, 2025               Dainelle Hernandez RN

## 2025-03-20 NOTE — DISCHARGE INSTRUCTIONS
Please call my office at [phone] to make an appointment in 2 weeks.  There are no dietary restrictions postoperatively.  No heavy lifting greater than 25 pounds for 2 weeks.  May shower and wash with soap and water, let skin glue fall off on its own  Do not drive while taking pain medication.  Take MiraLAX or stool softener of your choice daily while taking pain medication.  Please call my office if you experience any the following: Fever greater than 101.5, persistent nausea and vomiting, severe uncontrolled pain, yellow skin or eyes, and signs of infection including redness and drainage from the incision.

## 2025-03-20 NOTE — TELEPHONE ENCOUNTER
Caller: Will Garcia    Relationship: Self    Best call back number:[phone]    What is the best time to reach you: ANY    Who are you requesting to speak with (clinical staff, provider,  specific staff member): CLINICAL        What was the call regarding: PATIENT IS AT [facility] AND NEEDING A DISCHARGE STATEMENT SO THAT HE CAN GO HOME. PATIENT STATED THAT HE IS GOING HOME WITH OR WITHOUT A DISCHARGE STATEMENT. PATIENT STATED THAT HE WOULD SEE HAMLET COLLADO AT HIS HOSPITAL F/U ON 04.22.25 @11 AM. PATIENT ASKS THAT HE GET A CALL BACK TO ADVISE. THANK YOU.     Is it okay if the provider responds through MyChart:

## 2025-03-20 NOTE — DISCHARGE SUMMARY
Date of Discharge:  3/20/2025    Discharge Diagnosis:   #1 acute cholecystitis-the patient had acute cholecystitis with a gangrenous gallbladder and an extended surgery resulting in a prolonged hospital course  2.  CHF/volume overload-postoperatively the patient had evidence on chest x-ray of CHF and his O2 sat needs increased dramatically requiring oxygen supplementation  3.  Hypoxia-the patient required nasal cannula oxygen at 5 L at rest, 8 L with his CPAP, and up to 10 L after minimal activity with physical therapy.  He will need home oxygen therapy initiated since he has not needed O2 previously.  4.  ASCVD-no complaints of chest pain and seen and evaluated by cardiology who feels he is stable for discharge.  5.  Diabetes mellitus type 2-his blood sugars are under relatively good control with his most recent hemoglobin A1c of 7.1  6.  Sleep apnea-the patient uses CPAP nightly consistently  7.  Hypertension-his blood pressure is well-controlled  8.  Lumbar disc disease-stable and with no complaints of back pain throughout his hospitalization.  9.  BPH-no complaints of difficulties with urinating  10.  Hypokalemia-mild and started on potassium at discharge and likely related to the high doses of Lasix used for diuresis.  11.  Morbid obesity-this obviously complicated his surgery, contributes to his sleep apnea and diabetes, and will clearly slow down his recovery in general.  12.  Anemia-mild and likely related to the surgery and the CHF.          DETAILS OF HOSPITAL STAY     Presenting Problem/History of Present Illness  Epigastric pain [R10.13]  Gallstones [K80.20]  Cholelithiasis [K80.20]    Epigastric pain    Type 2 diabetes mellitus with neurologic complication    Class 3 severe obesity due to excess calories with serious comorbidity and body mass index (BMI) of 40.0 to 44.9 in adult    Acute gangrenous cholecystitis    Cardiac volume overload    Essential hypertension    Benign prostatic hyperplasia  with lower urinary tract symptoms    Obstructive sleep apnea treated with auto CPAP    Lumbar disc disease    High cholesterol    Cholelithiasis    Past Medical History:   Diagnosis Date    Arthritis     Cancer     Cataract     Chronic kidney disease     Chronic pain disorder     Coronary artery disease     Enlarged prostate     Essential hypertension     Hard to intubate     History of kidney stones     Injury of back     Low back pain     Lumbosacral disc disease     Lymph edema     BLE    Neuropathy     BLE    HELEN (obstructive sleep apnea) treated with auto CPAP 04/02/2010    Overnight polysomnogram.  Weight 254 pounds.  Severe HELEN with AHI 41.8 events per hour.  Sleep-related hypoxia also present.  Auto CPAP prescribed.    Osteoporosis     Spinal stenosis     Type 2 diabetes mellitus with diabetic neuropathy, with long-term current use of insulin      Past Surgical History:   Procedure Laterality Date    CARDIAC CATHETERIZATION Left 5/17/2018    Procedure: Cardiac Catheterization/Vascular Study;  Surgeon: Kaitlin Barney MD;  Location: [facility] CATH INVASIVE LOCATION;  Service: Cardiovascular    CARDIAC CATHETERIZATION N/A 5/17/2018    Procedure: Stent LACEY coronary;  Surgeon: Kaitlin Barney MD;  Location: [facility] CATH INVASIVE LOCATION;  Service: Cardiovascular    CATARACT EXTRACTION      CHOLECYSTECTOMY WITH INTRAOPERATIVE CHOLANGIOGRAM N/A 3/17/2025    Procedure: Laparoscopic cholecystectomy with cholangiogram;  Surgeon: Glen Pimentel MD;  Location: [facility] MAIN OR;  Service: General;  Laterality: N/A;    COLONOSCOPY N/A 4/3/2018    Procedure: COLONOSCOPY TO CECUM;  Surgeon: Royal Metcalf MD;  Location: [facility] ENDOSCOPY;  Service: General    EPIDURAL BLOCK      KIDNEY STONE SURGERY      LUMBAR DISCECTOMY Left 2/27/2019    Procedure: L4-5 lumbar decompression;  Surgeon: Darío Chowdhury MD;  Location: [facility] MAIN OR;  Service: Neurosurgery    SHOULDER ARTHROSCOPY Left     TONSILLECTOMY    "      Allergies  Ceclor [cefaclor]    Discharge Medications     Discharge Medications        New Medications        Instructions Start Date   potassium chloride 10 MEQ CR capsule  Commonly known as: MICRO-K   10 mEq, Oral, Daily             Changes to Medications        Instructions Start Date   furosemide 80 MG tablet  Commonly known as: LASIX  What changed: additional instructions   80 mg, Oral, Daily, Resume on 5/18             Continue These Medications        Instructions Start Date   allopurinol 100 MG tablet  Commonly known as: ZYLOPRIM   100 mg, Nightly      amLODIPine 10 MG tablet  Commonly known as: NORVASC   No dose, route, or frequency recorded.      aspirin 81 MG EC tablet   81 mg, Nightly      atorvastatin 10 MG tablet  Commonly known as: LIPITOR   10 mg, Take As Directed      BD Insulin Syringe U/F 31G X 5/16\" 1 ML misc  Generic drug: Insulin Syringe-Needle U-100   No dose, route, or frequency recorded.      fluticasone 50 MCG/ACT nasal spray  Commonly known as: FLONASE   2 sprays, As Needed      HYDROcodone-acetaminophen 5-325 MG per tablet  Commonly known as: NORCO   1 tablet, Oral, Every 6 Hours PRN      insulin glargine 100 UNIT/ML injection  Commonly known as: LANTUS, SEMGLEE   80 Units, Nightly      ketoconazole 2 % cream  Commonly known as: NIZORAL   1 Application, Daily      latanoprost 0.005 % ophthalmic solution  Commonly known as: XALATAN   1 drop, Nightly      losartan 100 MG tablet  Commonly known as: COZAAR   100 mg, Daily      metFORMIN 500 MG tablet  Commonly known as: GLUCOPHAGE   2,000 mg, Oral, Daily With Breakfast, PT TOOK ONLY 1000 MG YESTERDAY MORNING  Resume on 5/19      metoprolol succinate  MG 24 hr tablet  Commonly known as: TOPROL-XL   100 mg, Daily      NIFEdipine XL 60 MG 24 hr tablet  Commonly known as: PROCARDIA XL   60 mg, Nightly      ondansetron ODT 4 MG disintegrating tablet  Commonly known as: ZOFRAN-ODT   4 mg, Translingual, 4 Times Daily PRN    "   pioglitazone 45 MG tablet  Commonly known as: ACTOS   45 mg, Daily      tamsulosin 0.4 MG capsule 24 hr capsule  Commonly known as: FLOMAX   2 capsules, Nightly      TRULICITY SC   1 mL, Weekly      Vitamin D (Cholecalciferol) 10 MCG (400 UNIT) tablet  Commonly known as: CHOLECALCIFEROL   400 Units, Daily               Hospital Course  Patient is a 79 y.o. male presented with worsening abdominal pain.  Apparently had had pain going on for 6 days prior to being admitted for his abdominal pain and what turned out to be acute cholecystitis.  He waited 5 days before being seen in the emergency room.  Unfortunately he was discharged home from there despite the fact that he had pain and clear signs on the CT scan of cholecystitis.  2 days after being discharged she contacted me saying that he was having pain again and wanted to make me aware of the fact that he had been in the emergency room since I had been called the evening that he was there.  Because of his symptoms and the length of time that he been having trouble I recommended he go to the emergency room.  He refused to do that because he had a bad experience the last time he was there.  Unfortunately his pain progressed throughout the day to the point where he presented to the emergency room later that evening.  In the emergency room he was the opinion that he likely had acute cholecystitis but for some reason they had not repeated his CT scan to see if things were worse or more complicated.  I insisted that he go ahead and get another CT scan and it clearly showed significant changes but fortunately no evidence for rupture.  The patient was admitted, placed on IV antibiotics, IV fluids, he was made n.p.o., and a general surgery consult was placed.    The patient was seen the next day by general surgery who agreed that he had acute cholecystitis and needed to be taken to surgery.  He was promptly taken down to surgery where they had a difficult time extracting  his gangrenous gallbladder.  Postoperatively the patient had a great deal of pain and discomfort.  He also began to be come progressively more short of breath with increasing need for oxygen supplementation.  He was also becoming somewhat agitated and a little confused.  He was sent down for a chest x-ray which showed evidence for congestive heart failure.  His BNP was also elevated.  He was given some IV Lasix but unfortunately had a poor response.  Fortunately was not having any cardiac symptoms and had no evidence for acute cardiac changes.  Because of his history of Vaseretic cardiovascular disease cardiology was consulted to assist with his diuresis and to evaluate whether he had any other cardiac issues.  They agreed with the IV Lasix and ordered a 2D echocardiogram which showed a surprisingly well-maintained ejection fraction of 66%.  He had no significant valvular disease.    Over the next day or so the patient diuresed adequately although not as much as would have been expected.  At this point he still requiring 5 L at rest, 8 L and his CPAP at night, and when he worked with physical therapy he dropped so low that he required 10 L to bring him back up to 90%.  Surgery feels that he is relatively stable at this point.  They have got him taking a regular diet and his pain certainly is improving.  The the patient is anxious to be discharged home.  Cardiology agrees that despite his oxygen needs that he is diuresing adequately and can be discharged home on nasal cannula oxygen.  He is a little hypokalemic today so I will start him on some potassium.  I have given the patient clear instructions on the use of oxygen, maintaining his O2 saturations, using oxygen with his CPAP, and calling if he has worsening symptoms.  He needs to be very strict with a low-salt diet as well.    Because he is so insistent on discharge and cardiology agrees with that plan we will discharge him today with follow-up in my office in a  week and a follow-up with general surgery and cardiology as they arrange.  Hopefully will not have any further complications or decompensation.    Procedures Performed  Procedure(s):  Laparoscopic cholecystectomy with cholangiogram       Consults:   Consults       Date and Time Order Name Status Description    3/18/2025  8:45 PM Inpatient Cardiology Consult Completed     3/17/2025 12:13 AM Inpatient General Surgery Consult Completed     3/16/2025  9:39 PM IP General Consult (Use specialty-specific consult if known)              Pertinent Test Results:   Blood sugar 166, BUN 23, creatinine 0.99, sodium 138, potassium 3.4, EGFR 78  Troponin 17  WBC 8.3, hemoglobin 11.4, hematocrit 34.4, platelets 298  proBNP 3133  Hemoglobin A1c 7.1  Cholesterol 90, triglycerides 67, HDL 39, LDL 36  Magnesium 2.1    Pathology-gallbladder, acute necrotizing cholecystitis and cholelithiasis  Lactate 0.8  Blood cultures no growth in 3 days          Condition on Discharge:    Today the patient is awake, alert, and oriented.  He is demanding and assisting on being discharged today.  I discussed the fact that he still has relatively high oxygen requirements and that we do not usually discharge people like this.  Nonetheless he insist on being discharged home under any circumstances.  I discussed with him that we should asked the cardiologist that they are comfortable with discharge.  They indicated that they are comfortable and feel like he is stable and should do well as long as he maintains his oxygen saturations.  Will making arrangements for home oxygen therapy.  We are also making arrangements for home health for physical therapy as well.  He indicates that he is eating well, his pain is significantly reduced, he has no new complaints that he reports, and again he is being very demanding and is insistent that he be discharged to soon as possible.    Temperature 98.4, pulse 90, respiratory rate 18, blood pressure 146/66, O2 sat 93% on 5  "L  HEENT-no JVD or thyromegaly but his neck is very short and in fat and difficult to evaluate  Lungs bilateral breath sounds and clear with no evident crackles but he does not take a very good deep breath in general because of his morbid obesity  Heart-regular rate and rhythm  Abdomen-obese, tense, much less tender in the right upper quadrant than it was, bowel sounds are noted but diminished.  No evidence for rebound tenderness.  Extremities-no clubbing, cyanosis, or edema  Neurologic-the patient is awake and alert as noted but he is also somewhat irritable and agitated, rude to the nursing staff, and demanding that at all cost to be discharged today.    At this point the patient certainly is improving steadily and would likely benefit from another 24 hours in the hospital.  Nonetheless he demands on being discharged today, cardiology agrees, this will make arrangements for him to be discharged annual follow-up with me in a week and follow-up with cardiology as they arrange.  Hopefully will not have any further complications or problems.    His other medical conditions remained under relatively good control throughout his hospitalizations.  His blood sugars are reasonably well-controlled, his blood pressure looks good, he is not complaining of any back pain, and he continue to use his CPAP throughout his hospitalization although we did need to have supplemental oxygen up to 8 L in order to maintain his saturations overnight.  He has been instructed to call me if he has any questions or concerns.  He should see me in the office in 1 week for close follow-up.      Vital Signs  Temp:  [98.1 °F (36.7 °C)-98.4 °F (36.9 °C)] 98.4 °F (36.9 °C)  Heart Rate:  [79-90] 90  Resp:  [18-20] 18  BP: (113-146)/(59-95) 146/66    Flowsheet Rows      Flowsheet Row First Filed Value   Admission Height 172.7 cm (68\") Documented at 03/19/2025 0730   Admission Weight 127 kg (280 lb) Documented at 03/19/2025 0730                Discharge " Disposition  Home or Self Care        Discharge Diet:   Diet Instructions       Diet: Regular/House Diet, Cardiac Diets, Gastrointestinal Diets; Low Sodium (2g); Thin (IDDSI 0); Fat-Restricted      Discharge Diet:  Regular/House Diet  Cardiac Diets  Gastrointestinal Diets       Cardiac Diet: Low Sodium (2g)    Fluid Consistency: Thin (IDDSI 0)    Gastrointestinal Diet: Fat-Restricted            Activity at Discharge:   Activity Instructions       Activity as Tolerated      Gradually Increase Activity Until at Pre-Hospitalization Level      Up WIth Assist              Follow-up Appointments  Future Appointments   Date Time Provider Department Center   4/22/2025 11:00 AM Elsa Granado APRN [facility]   4/23/2025 11:00 AM Claude Yeager MD [facility] None     Additional Instructions for the Follow-ups that You Need to Schedule       Ambulatory Referral to Home Health   As directed      Face to Face Visit Date: 3/20/2025   Follow-up provider for Plan of Care?: I will be treating the patient on an ongoing basis.  Please send me the Plan of Care for signature.   Follow-up provider: CHEN ADAMS [[id number]]   Reason/Clinical Findings: Very weak and debilitated as well as hypoxic postoperatively   Describe mobility limitations that make leaving home difficult: Recent surgery, hypoxemia, morbid obesity   Nursing/Therapeutic Services Requested: Physical Therapy Occupational Therapy Home Monitoring (Select INITIATE PROTOCOL order from panel below)   PT orders: Strengthening Therapeutic exercise   Occupational orders: Activities of daily living   Frequency: 1 Week 1        Discharge Follow-up with PCP   As directed       Currently Documented PCP:    Chen Adams MD    PCP Phone Number:    [phone]     Follow Up Details: See me in the office in 7 days                Test Results Pending at Discharge  Pending Labs       Order Current Status    Blood Culture - Blood, Arm, Right Preliminary result              Dereck Lemus MD  03/20/25  12:22 EDT

## 2025-03-20 NOTE — CASE MANAGEMENT/SOCIAL WORK
Case Management Discharge Note      Final Note: home with H and oxygen from [facility]         Selected Continued Care - Discharged on 3/20/2025 Admission date: 3/16/2025 - Discharge disposition: Home or Self Care      Destination    No services have been selected for the patient.                Durable Medical Equipment    No services have been selected for the patient.                Dialysis/Infusion    No services have been selected for the patient.                Home Medical Care       Service Provider Services Address Phone Fax Patient Preferred    [facility] [address] [place] [address] [address] [address] [phone] --              Therapy    No services have been selected for the patient.                Community Resources    No services have been selected for the patient.                Community & DME    No services have been selected for the patient.                    Transportation Services  Private: Car    Final Discharge Disposition Code: 06 - home with home health care

## 2025-03-21 LAB — BACTERIA SPEC AEROBE CULT: NORMAL

## 2025-03-21 NOTE — OUTREACH NOTE
Prep Survey      Flowsheet Row Responses   [facility] patient discharged from? [place]   Is LACE score < 7 ? No   Eligibility Readm Mgmt   Discharge diagnosis CHOLECYSTECTOMY WITH INTRAOPERATIVE CHOLANGIOGRAM   Does the patient have one of the following disease processes/diagnoses(primary or secondary)? General Surgery   Does the patient have Home health ordered? Yes   What is the Home health agency?  [facility]   Is there a DME ordered? Yes   What DME was ordered? [facility]   Prep survey completed? Yes            DESHAUN DIAZ - Registered Nurse

## 2025-03-24 ENCOUNTER — READMISSION MANAGEMENT (OUTPATIENT)
Dept: CALL CENTER | Facility: HOSPITAL | Age: 80
End: 2025-03-24
Payer: COMMERCIAL

## 2025-03-24 NOTE — OUTREACH NOTE
General Surgery Week 1 Survey      Flowsheet Row Responses   [facility] patient discharged from? [place]   Does the patient have one of the following disease processes/diagnoses(primary or secondary)? General Surgery   Week 1 attempt successful? Yes   Call start time 1412   Call end time 1422   Discharge diagnosis CHOLECYSTECTOMY WITH INTRAOPERATIVE CHOLANGIOGRAM   Person spoke with today (if not patient) and relationship pt   Meds reviewed with patient/caregiver? Yes   Is the patient having any side effects they believe may be caused by any medication additions or changes? No   Does the patient have all medications related to this admission filled (includes all antibiotics, pain medications, etc.) Yes   Is the patient taking all medications as directed (includes completed medication regime)? Yes   Does the patient have a follow up appointment scheduled with their surgeon? Yes   Has the patient kept scheduled appointments due by today? N/A   What is the Home health agency?  [facility]   Has home health visited the patient within 72 hours of discharge? Yes   What DME was ordered? [facility]   Psychosocial issues? No   Did the patient receive a copy of their discharge instructions? Yes   Nursing interventions Reviewed instructions with patient   What is the patient's perception of their health status since discharge? Improving   Nursing interventions Nurse provided patient education   Is the patient /caregiver able to teach back basic post-op care? Continue use of incentive spirometry at least 1 week post discharge, Lifting as instructed by MD in discharge instructions, Keep incision areas clean,dry and protected   Is the patient/caregiver able to teach back signs and symptoms of incisional infection? Pus or odor from incision, Incisional warmth, Fever, Increased drainage or bleeding, Increased redness, swelling or pain at the incisonal site   Is the patient/caregiver able to teach back steps to  recovery at home? Make a list of questions for surgeon's appointment, Set small, achievable goals for return to baseline health, Rest and rebuild strength, gradually increase activity   If the patient is a current smoker, are they able to teach back resources for cessation? Not a smoker   Is the patient/caregiver able to teach back the hierarchy of who to call/visit for symptoms/problems? PCP, Specialist, Home health nurse, Urgent Care, ED, 911 Yes   Week 1 call completed? Yes   Is the patient interested in additional calls from an ambulatory ? No   Would this patient benefit from a Referral to [facility] Social Work? No   Wrap up additional comments pt doing ok. Stated HH came today and was rude. She stormed out without changing his dsg. He has an RN friend that came to change it. encouraged pt to call  agency.   Call end time 1422            ABEL PATRICK - Registered Nurse

## 2025-03-24 NOTE — TELEPHONE ENCOUNTER
PT was discharge on 3/20/25.    I spoke to the pt. He really wanted to thank you for the care you gave him.

## 2025-03-31 ENCOUNTER — TRANSCRIBE ORDERS (OUTPATIENT)
Dept: ADMINISTRATIVE | Facility: HOSPITAL | Age: 80
End: 2025-03-31
Payer: OTHER GOVERNMENT

## 2025-03-31 DIAGNOSIS — R10.11 RIGHT UPPER QUADRANT ABDOMINAL PAIN: Primary | ICD-10-CM

## 2025-04-01 ENCOUNTER — HOSPITAL ENCOUNTER (OUTPATIENT)
Dept: ULTRASOUND IMAGING | Facility: HOSPITAL | Age: 80
Discharge: HOME | End: 2025-04-01
Admitting: INTERNAL MEDICINE
Payer: COMMERCIAL

## 2025-04-01 DIAGNOSIS — R10.11 RIGHT UPPER QUADRANT ABDOMINAL PAIN: ICD-10-CM

## 2025-04-01 PROCEDURE — 76705 ECHO EXAM OF ABDOMEN: CPT

## 2025-04-06 NOTE — PROGRESS NOTES
Enter Query Response Below      Query Response: He had generalized volume overload only             If applicable, please update the problem list.

## 2025-04-14 ENCOUNTER — OFFICE VISIT (OUTPATIENT)
Dept: SURGERY | Facility: CLINIC | Age: 80
End: 2025-04-14
Payer: OTHER GOVERNMENT

## 2025-04-14 VITALS
WEIGHT: 263.6 LBS | BODY MASS INDEX: 39.95 KG/M2 | OXYGEN SATURATION: 99 % | HEIGHT: 68 IN | DIASTOLIC BLOOD PRESSURE: 75 MMHG | HEART RATE: 82 BPM | SYSTOLIC BLOOD PRESSURE: 150 MMHG

## 2025-04-14 DIAGNOSIS — K91.89 POSTCHOLECYSTECTOMY DIARRHEA: ICD-10-CM

## 2025-04-14 DIAGNOSIS — R19.7 POSTCHOLECYSTECTOMY DIARRHEA: ICD-10-CM

## 2025-04-14 DIAGNOSIS — K81.0 ACUTE GANGRENOUS CHOLECYSTITIS: Primary | ICD-10-CM

## 2025-04-14 PROCEDURE — 99024 POSTOP FOLLOW-UP VISIT: CPT | Performed by: STUDENT IN AN ORGANIZED HEALTH CARE EDUCATION/TRAINING PROGRAM

## 2025-04-14 RX ORDER — CHOLESTYRAMINE 4 G/9G
1 POWDER, FOR SUSPENSION ORAL 2 TIMES DAILY WITH MEALS
Qty: 60 PACKET | Refills: 2 | Status: SHIPPED | OUTPATIENT
Start: 2025-04-14

## 2025-04-16 NOTE — PROGRESS NOTES
POSTOPERATIVE OFFICE NOTE      HISTORY OF PRESENT ILLNESS:  This is a 79 y.o. male who presents for a post-operative visit after undergoing difficult laparoscopic cholecystectomy with IOC on 3/17 for necrotizing cholecystitis.  He is doing well postoperatively.  Tolerating regular diet without any nausea or vomiting.  He is, however, having what seems to be postcholecystectomy diarrhea immediately after eating.  His pain is controlled without any medication.  No fever, jaundice or scleral icterus.  Incisions are clean dry and intact.    Pathology:   Final Diagnosis   1.  Gallbladder, cholecystectomy:               A.  Acute necrotizing cholecystitis and cholelithiasis.       PHYSICAL EXAM:  General: not sick, awake and alert, no jaundice or scleral icterus  Abdomen: soft, nontender, nondistended, no guarding, incisions dry and intact without signs of infection      Body mass index is 40.08 kg/m².      DATA REVIEWED:  Vitals:    04/14/25 1104   BP: 150/75   Pulse: 82   SpO2: 99%     INTRAOPERATIVE CHOLANGIOGRAM 03/17/2025     HISTORY: Laparoscopic cholecystectomy with possible stones.     Contrast is seen in the intrahepatic, common hepatic, and common bile  ducts. No bile duct stones, strictures or biliary dilatation is seen.  There is free spill of contrast into the duodenum.    A/P:  Mr. Garcia is doing quite well following surgery.  I provided him with a prescription for cholestyramine to see if this helps with his diarrhea.  We discussed he has no ongoing activity or dietary restrictions.  I asked he give me a call if any issues arise such as jaundice, scleral icterus, right upper quadrant pain nausea vomiting or fever, or if his diarrhea does not resolve.    Glen Pimentel MD  General Surgery  [facility]    [address], [address]  [place] [address]  P: [phone]  F: [phone]

## 2025-04-17 NOTE — PROGRESS NOTES
RM:________     PCP: Dereck Lemus MD                                     Last EKG :  2025    : 1945  AGE: 79 y.o.    REASON FOR VISIT: __________________________________________    ____________________________________________________________                                                       Wt Readings from Last 3 Encounters:   25 120 kg (263 lb 9.6 oz)   25 127 kg (280 lb)   25 127 kg (280 lb)      BP Readings from Last 3 Encounters:   25 150/75   25 146/66   25 119/75          WT: ____________ HT: ______ BP: __________ HR ______   02% _______                 Lipid Panel          3/18/2025    11:37   Lipid Panel   Total Cholesterol 90    Triglycerides 67    HDL Cholesterol 39    VLDL Cholesterol 15    LDL Cholesterol  36    LDL/HDL Ratio 0.96

## 2025-04-22 ENCOUNTER — OFFICE VISIT (OUTPATIENT)
Dept: CARDIOLOGY | Age: 80
End: 2025-04-22
Payer: OTHER GOVERNMENT

## 2025-04-22 VITALS
HEIGHT: 68 IN | DIASTOLIC BLOOD PRESSURE: 68 MMHG | HEART RATE: 71 BPM | SYSTOLIC BLOOD PRESSURE: 138 MMHG | BODY MASS INDEX: 40.32 KG/M2 | WEIGHT: 266 LBS | OXYGEN SATURATION: 96 %

## 2025-04-22 DIAGNOSIS — I10 ESSENTIAL HYPERTENSION: Primary | ICD-10-CM

## 2025-04-22 DIAGNOSIS — I35.0 NONRHEUMATIC AORTIC VALVE STENOSIS: ICD-10-CM

## 2025-04-22 DIAGNOSIS — G47.33 OBSTRUCTIVE SLEEP APNEA: ICD-10-CM

## 2025-04-22 PROCEDURE — 99214 OFFICE O/P EST MOD 30 MIN: CPT | Performed by: NURSE PRACTITIONER

## 2025-04-22 NOTE — ASSESSMENT & PLAN NOTE
Hypertension is stable and controlled  Continue current treatment regimen.  Dietary sodium restriction.  Regular aerobic exercise.  Ambulatory blood pressure monitoring.  Blood pressure will be reassessed  as needed .

## 2025-04-22 NOTE — PROGRESS NOTES
CARDIOLOGY        Patient Name: Will Garcia  :1945  Age: 79 y.o.  Primary Cardiologist: Zay Anderson MD  Encounter Provider:  TOBIAS Gagnon    Date of Service: 2025      CHIEF COMPLAINT / REASON FOR OFFICE VISIT     Hospital Follow Up Visit, Coronary Artery Disease, and Hypertension      HISTORY OF PRESENT ILLNESS       HPI  Will Garcia is a 79 y.o. male who presents today for hospital follow-up.     Pt has a  history significant for HTN, DM 2, obesity, HELEN with CPAP, CAD.    Echocardiogram May 2018 revealed LVEF 61% with mild to moderate LVH.    Myocardial perfusion PET stress test 5/15/2018 revealed a medium sized, moderately severe area of ischemia in the anterior wall and apex.  Impressions consistent with an intermediate risk study.    Cardiac catheterization 2018 revealed LAD with 30% proximal stenosis, 90-95% mid vessel stenosis.  Small diagonal branch without significant disease.  Moderate caliber second diagonal with no significant disease.  Second diagonal branch long area of 50% stenosis.  Remainder of the LAD is free of disease.  Circumflex was 0% proximal stenosis, 30% mid vessel stenosis at the origin of a moderate-sized first obtuse marginal branch.  No significant disease in first obtuse marginal branch.  Second obtuse marginal branch is free of disease.  Circumflex with 0% stenosis.  RCA with 40-50% mid vessel stenosis.  Successful PCI with LACEY to the mid LAD.    Patient was evaluated in hospital setting in 2025.  Patient presented with complaints of abdominal pain x 3 days.  He underwent laparoscopic cholecystectomy with intraoperative cholangiogram.  Patient has been experiencing increased oxygen demand x 1 night.  He was seen in consultation for possible CHF.  Patient received IV diuretics and echocardiogram was ordered.    Echocardiogram 3/19/2025 revealed LVEF 66% with grade 1 diastolic function.  Calcification of the aortic valve with mild aortic  "valve stenosis.  Mean pressure gradient 15 mmHg.    Patient reports today for hospital follow-up.  He reports that he has done very well.  He has remained on his diuretic without any lower extremity edema or increased dyspnea.  He currently denies chest pain, dyspnea at rest or with exertion, orthopnea, fatigue, lightheadedness.    The following portions of the patient's history were reviewed and updated as appropriate: allergies, current medications, past family history, past medical history, past social history, past surgical history and problem list.      VITAL SIGNS     Visit Vitals  /68   Pulse 71   Ht 172.7 cm (68\")   Wt 121 kg (266 lb)   SpO2 96%   BMI 40.45 kg/m²         Wt Readings from Last 3 Encounters:   04/22/25 121 kg (266 lb)   04/14/25 120 kg (263 lb 9.6 oz)   03/19/25 127 kg (280 lb)     Body mass index is 40.45 kg/m².      REVIEW OF SYSTEMS     Review of Systems   Constitutional: Negative for chills, fever, weight gain and weight loss.   Cardiovascular:  Negative for leg swelling.   Respiratory:  Negative for cough, snoring and wheezing.    Hematologic/Lymphatic: Negative for bleeding problem. Does not bruise/bleed easily.   Skin:  Negative for color change.   Musculoskeletal:  Negative for falls, joint pain and myalgias.   Gastrointestinal:  Negative for melena.   Genitourinary:  Negative for hematuria.   Neurological:  Negative for excessive daytime sleepiness.   Psychiatric/Behavioral:  Negative for depression. The patient is not nervous/anxious.            PHYSICAL EXAMINATION     Constitutional:       Appearance: Normal appearance. Well-developed.   Eyes:      Conjunctiva/sclera: Conjunctivae normal.   Neck:      Vascular: No carotid bruit.   Pulmonary:      Effort: Pulmonary effort is normal.      Breath sounds: Normal breath sounds.   Cardiovascular:      Normal rate. Regular rhythm. Normal S1. Normal S2.       Murmurs: There is no murmur.      No gallop.  No click. No rub.   Edema:    " " Peripheral edema absent.   Musculoskeletal: Normal range of motion. Skin:     General: Skin is warm and dry.   Neurological:      Mental Status: Alert and oriented to person, place, and time.      GCS: GCS eye subscore is 4. GCS verbal subscore is 5. GCS motor subscore is 6.   Psychiatric:         Speech: Speech normal.         Behavior: Behavior normal.         Thought Content: Thought content normal.         Judgment: Judgment normal.           REVIEWED DATA     Procedures        Lipid Panel          3/18/2025    11:37   Lipid Panel   Total Cholesterol 90    Triglycerides 67    HDL Cholesterol 39    VLDL Cholesterol 15    LDL Cholesterol  36    LDL/HDL Ratio 0.96        Lab Results   Component Value Date     03/20/2025     03/19/2025    K 3.4 (L) 03/20/2025    K 3.9 03/19/2025     03/20/2025     03/19/2025    CO2 24.0 03/20/2025    CO2 22.0 03/19/2025    BUN 23 03/20/2025    BUN 30 (H) 03/19/2025    CREATININE 0.99 03/20/2025    CREATININE 1.25 03/19/2025    EGFRIFNONA 86 02/27/2019    EGFRIFNONA 87 02/19/2019    GLUCOSE 166 (H) 03/20/2025    GLUCOSE 66 03/19/2025    CALCIUM 8.9 03/20/2025    CALCIUM 8.5 (L) 03/19/2025    ALBUMIN 3.1 (L) 03/18/2025    ALBUMIN 3.6 03/16/2025    BILITOT 0.5 03/18/2025    BILITOT 0.6 03/16/2025    AST 23 03/18/2025    AST 14 03/16/2025    ALT 19 03/18/2025    ALT 8 03/16/2025     Lab Results   Component Value Date    WBC 8.33 03/20/2025    WBC 12.01 (H) 03/18/2025    HGB 11.4 (L) 03/20/2025    HGB 10.8 (L) 03/18/2025    HCT 34.4 (L) 03/20/2025    HCT 34.3 (L) 03/18/2025    MCV 88.0 03/20/2025    MCV 91.5 03/18/2025     03/20/2025     03/18/2025     Lab Results   Component Value Date    PROBNP 3,133.0 (H) 03/19/2025     Lab Results   Component Value Date    TROPONINT 17 03/20/2025     No results found for: \"TSH\"          ASSESSMENT & PLAN     Diagnoses and all orders for this visit:    1. Essential hypertension (Primary)  Overview:  M pain 10 " "mg/day  Furosemide 80 mg/day  Losartan 100 mg/day  Metoprolol succinate 100 mg/day  Procardia 60 mg/day    Assessment & Plan:  Hypertension is stable and controlled  Continue current treatment regimen.  Dietary sodium restriction.  Regular aerobic exercise.  Ambulatory blood pressure monitoring.  Blood pressure will be reassessed  as needed .      2. Obstructive sleep apnea treated with auto CPAP    3. Nonrheumatic aortic valve stenosis  Overview:  Echocardiogram 3/19/2025 revealed LVEF 66% with grade 1 diastolic function.  Calcification of the aortic valve with mild aortic valve stenosis.  Mean pressure gradient 15 mmHg.    Assessment & Plan:  Denies angina, dyspnea, volume overload          Return in about 6 months (around 10/22/2025) for Dr. Anderson- Routine.    Future Appointments         Provider Department Center    4/23/2025 11:00 AM Claude Yeager MD [facility] SLEEP MEDICINE               MEDICATIONS         Discharge Medications            Accurate as of April 22, 2025 12:13 PM. If you have any questions, ask your nurse or doctor.                Continue These Medications        Instructions Start Date   allopurinol 100 MG tablet  Commonly known as: ZYLOPRIM   100 mg, Nightly      amLODIPine 10 MG tablet  Commonly known as: NORVASC   No dose, route, or frequency recorded.      aspirin 81 MG EC tablet   81 mg, Nightly      atorvastatin 10 MG tablet  Commonly known as: LIPITOR   10 mg, Take As Directed      BD Insulin Syringe U/F 31G X 5/16\" 1 ML misc  Generic drug: Insulin Syringe-Needle U-100   No dose, route, or frequency recorded.      cholestyramine 4 g packet  Commonly known as: Questran   1 packet, Oral, 2 Times Daily With Meals      fluticasone 50 MCG/ACT nasal spray  Commonly known as: FLONASE   2 sprays, As Needed      furosemide 80 MG tablet  Commonly known as: LASIX   80 mg, Oral, Daily, Resume on 5/18      insulin glargine 100 UNIT/ML injection  Commonly known as: LANTUS, " SEMGLEE   80 Units, Nightly      ketoconazole 2 % cream  Commonly known as: NIZORAL   1 Application, Daily      latanoprost 0.005 % ophthalmic solution  Commonly known as: XALATAN   1 drop, Nightly      losartan 100 MG tablet  Commonly known as: COZAAR   100 mg, Daily      metFORMIN 500 MG tablet  Commonly known as: GLUCOPHAGE   2,000 mg, Oral, Daily With Breakfast, PT TOOK ONLY 1000 MG YESTERDAY MORNING  Resume on 5/19      metoprolol succinate  MG 24 hr tablet  Commonly known as: TOPROL-XL   100 mg, Daily      NIFEdipine XL 60 MG 24 hr tablet  Commonly known as: PROCARDIA XL   60 mg, Nightly      ondansetron ODT 4 MG disintegrating tablet  Commonly known as: ZOFRAN-ODT   4 mg, Translingual, 4 Times Daily PRN      pioglitazone 45 MG tablet  Commonly known as: ACTOS   45 mg, Daily      potassium chloride 10 MEQ CR capsule  Commonly known as: MICRO-K   10 mEq, Oral, Daily      tamsulosin 0.4 MG capsule 24 hr capsule  Commonly known as: FLOMAX   2 capsules, Nightly      TRULICITY SC   1 mL, Weekly      Vitamin D (Cholecalciferol) 10 MCG (400 UNIT) tablet  Commonly known as: CHOLECALCIFEROL   400 Units, Daily                   **Dragon Disclaimer:   Much of this encounter note is an electronic transcription/translation of spoken language to printed text. The electronic translation of spoken language may permit erroneous, or at times, nonsensical words or phrases to be inadvertently transcribed. Although I have reviewed the note for such errors, some may still exist.

## 2025-04-23 ENCOUNTER — OFFICE VISIT (OUTPATIENT)
Dept: SLEEP MEDICINE | Facility: HOSPITAL | Age: 80
End: 2025-04-23
Payer: OTHER GOVERNMENT

## 2025-04-23 VITALS — OXYGEN SATURATION: 96 % | WEIGHT: 268 LBS | BODY MASS INDEX: 40.62 KG/M2 | HEIGHT: 68 IN | HEART RATE: 67 BPM

## 2025-04-23 DIAGNOSIS — E66.01 MORBID (SEVERE) OBESITY DUE TO EXCESS CALORIES: ICD-10-CM

## 2025-04-23 DIAGNOSIS — G47.36 HYPOXEMIA ASSOCIATED WITH SLEEP: ICD-10-CM

## 2025-04-23 DIAGNOSIS — G47.33 OBSTRUCTIVE SLEEP APNEA: Primary | ICD-10-CM

## 2025-04-23 DIAGNOSIS — E66.813 CLASS 3 SEVERE OBESITY DUE TO EXCESS CALORIES WITH SERIOUS COMORBIDITY AND BODY MASS INDEX (BMI) OF 40.0 TO 44.9 IN ADULT: ICD-10-CM

## 2025-04-23 DIAGNOSIS — E66.01 CLASS 3 SEVERE OBESITY DUE TO EXCESS CALORIES WITH SERIOUS COMORBIDITY AND BODY MASS INDEX (BMI) OF 40.0 TO 44.9 IN ADULT: ICD-10-CM

## 2025-04-23 PROCEDURE — G0463 HOSPITAL OUTPT CLINIC VISIT: HCPCS

## 2025-04-23 PROCEDURE — 99213 OFFICE O/P EST LOW 20 MIN: CPT | Performed by: INTERNAL MEDICINE

## 2025-04-23 NOTE — PROGRESS NOTES
"[facility]  Follow Up Sleep Disorders Center Note     Chief Complaint:  HELEN     Primary Care Physician: Dereck Lemus MD    Interval History:   The patient is a 79 y.o. male who I last saw 4/24/2024 and that note was reviewed.  The patient reports he is doing well without new complaints.  The patient did have a gangrenous gallbladder that was removed 3/17/2025.  He states he has back to baseline.    The patient is here with his wife.  He goes to bed at 10 PM gets out of bed at 7 AM.  He will use the restroom during that time    Review of Systems:    A complete review of systems was done and all were negative with the exception of some shortness of breath with exertion    Social History:    Social History     Socioeconomic History    Marital status:      Spouse name: Melanie    Number of children: 2    Years of education: 21   Tobacco Use    Smoking status: Never    Smokeless tobacco: Never   Vaping Use    Vaping status: Never Used   Substance and Sexual Activity    Alcohol use: No    Drug use: No    Sexual activity: Defer       Allergies:  Ceclor [cefaclor]     Medication Review:  Reviewed.      Vital Signs:    Vitals:    04/23/25 1053   Pulse: 67   SpO2: 96%   Weight: 122 kg (268 lb)   Height: 172.7 cm (68\")     Body mass index is 40.75 kg/m².    Physical Exam:    Constitutional:  Well developed 79 y.o. male that appears in no apparent distress.  Awake & oriented times 3.  Normal mood with normal recent and remote memory and normal judgement.  Eyes:  Conjunctivae normal.  Oropharynx: Previously, moist mucous membranes without exudate and a large tongue and class III Mallampati airway    Self-administered [place] Sleepiness Scale test results: 2  0-5 Lower normal daytime sleepiness  6-10 Higher normal daytime sleepiness  11-12 Mild, 13-15 Moderate, & 16-24 Severe excessive daytime sleepiness     Downloaded PAP Data Evaluated For Therapeutic Response and Compliance:  BARBARA is Serenity and the " patient uses a nasal pillow.  Downloads between 1/22 and 4/21/2025 compliance 100%.  Average usage is 8 hours and 49 minutes.  Average AHI is normal without leak.  Average auto CPAP pressure is 14.1 and his ResMed auto CPAP is 11-15    I have reviewed the above results and compared them with the patient's last downloads and reviewed with the patient.    Impression:   Severe obstructive sleep apnea with sleep-related hypoxia by overnight polysomnogram 4/2/2010, weight 254 pounds, adequately treated with ResMed auto CPAP. The patient appears to be at goal with good compliance and usage. The patient has no complaints of hypersomnolence.     Plan:  Good sleep hygiene measures should be maintained.  Weight loss would be beneficial in this patient who has class III severe obesity by Body mass index is 40.75 kg/m².  When seen 1 year ago, his weight was 286 pounds and presently he weighs 268 pounds.    Class 3 Severe Obesity (BMI >=40). Obesity-related health conditions include the following: obstructive sleep apnea. Obesity is improving with treatment. BMI is is above average; BMI management plan is completed. We discussed portion control and increasing exercise.       After evaluating the patient and assessing results available, the patient is benefiting from the treatment being provided.     The patient will continue ResMed auto CPAP.  Potential side effects of not using PAP therapy reviewed and addressed as needed.  After clinical evaluation and review of downloads, I recommend no changes to the patient's pressures.  A new prescription will be sent to the patient's DME.    I answered all of the patient's questions.  The patient will call the Sleep Disorder Center for any problems and will follow up in 1 year.      Claude Yeager MD  Sleep Medicine  04/23/25  11:02 EDT

## 2025-04-28 PROBLEM — E66.01 MORBID (SEVERE) OBESITY DUE TO EXCESS CALORIES: Status: ACTIVE | Noted: 2025-04-28

## 2025-04-28 PROBLEM — G47.36 HYPOXEMIA ASSOCIATED WITH SLEEP: Status: ACTIVE | Noted: 2025-04-28

## 2025-08-19 ENCOUNTER — TELEPHONE (OUTPATIENT)
Dept: SLEEP MEDICINE | Facility: HOSPITAL | Age: 80
End: 2025-08-19
Payer: OTHER GOVERNMENT

## (undated) DEVICE — TROCAR: Brand: KII OPTICAL ACCESS SYSTEM

## (undated) DEVICE — DRP MICROSCOPE 4 BINOCULAR CV 54X150IN

## (undated) DEVICE — DISPOSABLE MONOPOLAR ENDOSCOPIC CORD 10 FT. (3M): Brand: KIRWAN

## (undated) DEVICE — KT MANIFLD CARDIAC

## (undated) DEVICE — CATH VENT MIV RADL PIG ST TIP 5F 110CM

## (undated) DEVICE — ADHS SKIN DERMABOND TOP ADVANCED

## (undated) DEVICE — INSUFFLATION TUBING SET, WITH GAS FILTER: Brand: N.A.

## (undated) DEVICE — PK LAP CHOLE BG

## (undated) DEVICE — EXOFIN PRECISION PEN HIGH VISCOSITY TOPICAL SKIN ADHESIVE: Brand: EXOFIN PRECISION PEN, 1G

## (undated) DEVICE — CONTAINER,SPECIMEN,OR STERILE,4OZ: Brand: MEDLINE

## (undated) DEVICE — TBG INSUFFLATION LUER LOCK: Brand: MEDLINE INDUSTRIES, INC.

## (undated) DEVICE — SMOKE EVACUATION TUBING WITH 7/8 IN TO 1/4 IN REDUCER: Brand: BUFFALO FILTER

## (undated) DEVICE — DISPOSABLE 9450003 ELECTRO TWST PAIR 8CH

## (undated) DEVICE — Device

## (undated) DEVICE — LAPAROSCOPIC SMOKE ELIMINATION DEVICE: Brand: PNEUVIEW XE

## (undated) DEVICE — ENCORE® LATEX ORTHO SIZE 8.5, STERILE LATEX POWDER-FREE SURGICAL GLOVE: Brand: ENCORE

## (undated) DEVICE — GW EMR FIX EXCHG J STD .035 3MM 260CM

## (undated) DEVICE — STPCK 3WY D201 DISCOFIX

## (undated) DEVICE — DEV SUT GRSPR CLOSUR 15CM 14G

## (undated) DEVICE — SYR LUERLOK 20CC

## (undated) DEVICE — 1010 S-DRAPE TOWEL DRAPE 10/BX: Brand: STERI-DRAPE™

## (undated) DEVICE — CONN TBG Y 5 IN 1 LF STRL

## (undated) DEVICE — 3.0MM PRECISION NEURO (MATCH HEAD)

## (undated) DEVICE — NDL HYPO PRECISIONGLIDE REG 25G 1 1/2

## (undated) DEVICE — SYR LL TP 10ML STRL

## (undated) DEVICE — CATH DIAG IMPULSE FR4 5F 100CM

## (undated) DEVICE — SYR LUERLOK 5CC

## (undated) DEVICE — ENDOPOUCH RETRIEVER SPECIMEN RETRIEVAL BAGS: Brand: ENDOPOUCH RETRIEVER

## (undated) DEVICE — NC TREK CORONARY DILATATION CATHETER 2.5 MM X 25 MM / RAPID-EXCHANGE: Brand: NC TREK

## (undated) DEVICE — CANN NASL CO2 TRULINK W/O2 A/

## (undated) DEVICE — ANTIBACTERIAL UNDYED BRAIDED (POLYGLACTIN 910), SYNTHETIC ABSORBABLE SUTURE: Brand: COATED VICRYL

## (undated) DEVICE — XIENCE ALPINE EVEROLIMUS ELUTING CORONARY STENT SYSTEM 2.50 MM X 33 MM / RAPID-EXCHANGE
Type: IMPLANTABLE DEVICE | Status: NON-FUNCTIONAL
Brand: XIENCE ALPINE

## (undated) DEVICE — GLIDESHEATH BASIC HYDROPHILIC COATED INTRODUCER SHEATH: Brand: GLIDESHEATH

## (undated) DEVICE — DEV INDEFLATOR

## (undated) DEVICE — TROCAR: Brand: KII SLEEVE

## (undated) DEVICE — TROCAR: Brand: KII FIOS FIRST ENTRY

## (undated) DEVICE — SUT VIC 0 UR6 27IN VCP603H

## (undated) DEVICE — PK CATH CARD 40

## (undated) DEVICE — LAPAROVUE VISIBILITY SYSTEM LAPAROSCOPIC SOLUTIONS: Brand: LAPAROVUE

## (undated) DEVICE — SUT MNCRYL PLS ANTIB UD 4/0 PS2 18IN

## (undated) DEVICE — DRP C/ARM 41X74IN

## (undated) DEVICE — SYR LUERLOK 20CC BX/50

## (undated) DEVICE — GLV SURG SENSICARE MICRO PF LF 8 STRL

## (undated) DEVICE — APPL CHLORAPREP HI/LITE 26ML ORNG

## (undated) DEVICE — THE TORRENT IRRIGATION SCOPE CONNECTOR IS USED WITH THE TORRENT IRRIGATION TUBING TO PROVIDE IRRIGATION FLUIDS SUCH AS STERILE WATER DURING GASTROINTESTINAL ENDOSCOPIC PROCEDURES WHEN USED IN CONJUNCTION WITH AN IRRIGATION PUMP (OR ELECTROSURGICAL UNIT).: Brand: TORRENT

## (undated) DEVICE — DRP SLUSH WARMR MACH 52X66IN OM-ORS-301

## (undated) DEVICE — HI-TORQUE BALANCE MIDDLEWEIGHT GUIDE WIRE .014 STRAIGHT TIP 3.0 CM X 190 CM: Brand: HI-TORQUE BALANCE MIDDLEWEIGHT

## (undated) DEVICE — SOL NACL 0.9PCT 1000ML

## (undated) DEVICE — TREK CORONARY DILATATION CATHETER 2.50 MM X 20 MM / RAPID-EXCHANGE: Brand: TREK

## (undated) DEVICE — CODMAN® SURGICAL PATTIES 3/4" X 3/4" (1.91CM X 1.91CM): Brand: CODMAN®

## (undated) DEVICE — RESERVOIR,SUCTION,100CC,SILICONE: Brand: MEDLINE

## (undated) DEVICE — THE FLOSEAL MALLEABLE TIP AND TRIMMABLE TIP ARE INTENDED FOR DELIVERY OF FLOSEAL HEMOSTATIC MATRIX.: Brand: FLOSEAL SPECIAL APPLICATOR TIPS

## (undated) DEVICE — LAPAROSCOPIC SMOKE FILTRATION SYSTEM: Brand: PALL LAPAROSHIELD® PLUS LAPAROSCOPIC SMOKE FILTRATION SYSTEM

## (undated) DEVICE — Device: Brand: DEFENDO AIR/WATER/SUCTION AND BIOPSY VALVE

## (undated) DEVICE — PK NEURO SPINE 40

## (undated) DEVICE — GLV SURG BIOGEL LTX PF 7 1/2

## (undated) DEVICE — SEALANT HEMOS FLOSEAL MATRX W/MALL TP 5ML EA/6

## (undated) DEVICE — ENDOPATH PNEUMONEEDLE INSUFFLATION NEEDLES WITH LUER LOCK CONNECTORS 120MM: Brand: ENDOPATH

## (undated) DEVICE — NDL SPINE 20G 3 1/2 YEL STRL 1P/U

## (undated) DEVICE — CATH DIAG IMPULSE FL3.5 5F 100CM

## (undated) DEVICE — BND PRESS RADL COMFRT 14IN STRL

## (undated) DEVICE — SHLD ANGIO 2LAYR CIR FEN

## (undated) DEVICE — GOWN,PREVENTION PLUS,XXLARGE,STERILE: Brand: MEDLINE

## (undated) DEVICE — TUBING, SUCTION, 1/4" X 10', STRAIGHT: Brand: MEDLINE

## (undated) DEVICE — 6F .070 XB LAD 3.5 100CM: Brand: VISTA BRITE TIP

## (undated) DEVICE — APPL CHLORAPREP W/TINT 26ML ORNG